# Patient Record
Sex: FEMALE | Race: BLACK OR AFRICAN AMERICAN | Employment: OTHER | URBAN - METROPOLITAN AREA
[De-identification: names, ages, dates, MRNs, and addresses within clinical notes are randomized per-mention and may not be internally consistent; named-entity substitution may affect disease eponyms.]

---

## 2017-01-03 ENCOUNTER — NURSE ONLY (OUTPATIENT)
Dept: CARDIOLOGY CLINIC | Age: 82
End: 2017-01-03

## 2017-01-03 VITALS
DIASTOLIC BLOOD PRESSURE: 80 MMHG | WEIGHT: 117 LBS | BODY MASS INDEX: 21.53 KG/M2 | SYSTOLIC BLOOD PRESSURE: 122 MMHG | HEART RATE: 72 BPM | HEIGHT: 62 IN

## 2017-01-03 DIAGNOSIS — Z95.0 CARDIAC PACEMAKER IN SITU: ICD-10-CM

## 2017-01-03 DIAGNOSIS — I48.92 ATRIAL FIBRILLATION AND FLUTTER (HCC): Primary | ICD-10-CM

## 2017-01-03 DIAGNOSIS — I48.91 ATRIAL FIBRILLATION AND FLUTTER (HCC): Primary | ICD-10-CM

## 2017-01-03 PROCEDURE — 93280 PM DEVICE PROGR EVAL DUAL: CPT | Performed by: INTERNAL MEDICINE

## 2017-03-03 RX ORDER — MIDODRINE HYDROCHLORIDE 5 MG/1
5 TABLET ORAL 3 TIMES DAILY
Qty: 270 TABLET | Refills: 3 | Status: SHIPPED | OUTPATIENT
Start: 2017-03-03 | End: 2017-04-27 | Stop reason: SDUPTHER

## 2017-03-27 ENCOUNTER — TELEPHONE (OUTPATIENT)
Dept: CARDIOLOGY CLINIC | Age: 82
End: 2017-03-27

## 2017-04-27 ENCOUNTER — PROCEDURE VISIT (OUTPATIENT)
Dept: CARDIOLOGY CLINIC | Age: 82
End: 2017-04-27

## 2017-04-27 ENCOUNTER — OFFICE VISIT (OUTPATIENT)
Dept: CARDIOLOGY CLINIC | Age: 82
End: 2017-04-27

## 2017-04-27 VITALS
SYSTOLIC BLOOD PRESSURE: 130 MMHG | DIASTOLIC BLOOD PRESSURE: 70 MMHG | WEIGHT: 117.2 LBS | HEART RATE: 80 BPM | BODY MASS INDEX: 21.57 KG/M2 | HEIGHT: 62 IN

## 2017-04-27 VITALS — HEART RATE: 72 BPM | SYSTOLIC BLOOD PRESSURE: 114 MMHG | DIASTOLIC BLOOD PRESSURE: 74 MMHG

## 2017-04-27 DIAGNOSIS — I48.19 PERSISTENT ATRIAL FIBRILLATION (HCC): Primary | ICD-10-CM

## 2017-04-27 PROCEDURE — 99214 OFFICE O/P EST MOD 30 MIN: CPT | Performed by: INTERNAL MEDICINE

## 2017-04-27 PROCEDURE — 93280 PM DEVICE PROGR EVAL DUAL: CPT | Performed by: INTERNAL MEDICINE

## 2017-04-27 RX ORDER — DIGOXIN 125 MCG
125 TABLET ORAL SEE ADMIN INSTRUCTIONS
Qty: 45 TABLET | Refills: 5 | Status: SHIPPED | OUTPATIENT
Start: 2017-04-27 | End: 2018-02-20 | Stop reason: SDUPTHER

## 2017-04-27 RX ORDER — MIDODRINE HYDROCHLORIDE 5 MG/1
5 TABLET ORAL 3 TIMES DAILY
Qty: 270 TABLET | Refills: 3 | Status: SHIPPED | OUTPATIENT
Start: 2017-04-27 | End: 2018-02-20 | Stop reason: SDUPTHER

## 2017-04-27 RX ORDER — FUROSEMIDE 20 MG/1
20 TABLET ORAL 2 TIMES DAILY
Qty: 360 TABLET | Refills: 2 | Status: SHIPPED | OUTPATIENT
Start: 2017-04-27 | End: 2018-02-20 | Stop reason: SDUPTHER

## 2017-04-27 NOTE — MR AVS SNAPSHOT
[address] [address]     Phone:  [phone]     apixaban 2.5 MG Tabs tablet    digoxin 125 MCG tablet    furosemide 20 MG tablet    midodrine 5 MG tablet         Your Current Medications Are              apixaban (ELIQUIS) 2.5 MG TABS tablet Take 1 tablet by mouth 2 times daily    midodrine (PROAMATINE) 5 MG tablet Take 1 tablet by mouth 3 times daily    digoxin (DIGOX) 125 MCG tablet Take 1 tablet by mouth See Admin Instructions One tablet every other day. furosemide (LASIX) 20 MG tablet Take 1 tablet by mouth 2 times daily    apixaban (ELIQUIS) 2.5 MG TABS tablet Take 1 tablet by mouth 2 times daily    sodium chloride (ALTAMIST SPRAY) 0.65 % nasal spray 2 sprays by Nasal route 4 times daily. calcium carbonate (OSCAL) 500 MG TABS tablet Take 500 mg by mouth 2 times daily       Allergies           No Known Allergies      We Ordered/Performed the following           Pacer coco dual chamber with reprog          Additional Information        Basic Information     Date Of Birth Sex Race Ethnicity Preferred Language    12/15/1921 Female Black Non-/Non  English      Problem List as of 4/27/2017  Date Reviewed: 11/17/2016                Atrial fibrillation and flutter ([facility] [address]    CHF (congestive heart failure) ([facility] [address]    Mitral regurgitation    Pedal edema      Immunizations as of 4/27/2017     Name Date    Influenza Virus Vaccine 9/18/2012, 9/27/2011, 10/9/2009, 10/23/2008    Influenza Whole 10/1/2010    Influenza, High dose, IM, Preservative-free 9/7/2016, 9/3/2015, 9/18/2014, 9/1/2013    Pneumococcal Conjugate 7-valent 1/1/2000    Pneumococcal Polysaccharide ([id number]) 1/1/2000    Tdap (Boostrix, Adacel) 7/20/2012, 1/1/2002      Health Maintenance Summary                    Pneumococcal low/med risk Overdue 1/1/2001     DTaP/Tdap/Td vaccine Next Due 7/20/2022             MyChart Signup           Our records indicate that you have declined MyChart signup.

## 2017-05-08 ENCOUNTER — TELEPHONE (OUTPATIENT)
Dept: CARDIOLOGY CLINIC | Age: 82
End: 2017-05-08

## 2017-05-08 ENCOUNTER — PROCEDURE VISIT (OUTPATIENT)
Dept: CARDIOLOGY CLINIC | Age: 82
End: 2017-05-08

## 2017-05-08 DIAGNOSIS — I48.19 PERSISTENT ATRIAL FIBRILLATION (HCC): ICD-10-CM

## 2017-05-08 LAB
LV EF: 60 %
LVEF MODALITY: NORMAL

## 2017-05-08 PROCEDURE — 93306 TTE W/DOPPLER COMPLETE: CPT | Performed by: INTERNAL MEDICINE

## 2017-05-11 ENCOUNTER — TELEPHONE (OUTPATIENT)
Dept: CARDIOLOGY CLINIC | Age: 82
End: 2017-05-11

## 2017-07-18 ENCOUNTER — OFFICE VISIT (OUTPATIENT)
Dept: FAMILY MEDICINE CLINIC | Age: 82
End: 2017-07-18

## 2017-07-18 VITALS
BODY MASS INDEX: 20.98 KG/M2 | DIASTOLIC BLOOD PRESSURE: 70 MMHG | HEART RATE: 76 BPM | TEMPERATURE: 98.2 F | WEIGHT: 114 LBS | HEIGHT: 62 IN | SYSTOLIC BLOOD PRESSURE: 102 MMHG

## 2017-07-18 DIAGNOSIS — J06.9 UPPER RESPIRATORY TRACT INFECTION, UNSPECIFIED TYPE: Primary | ICD-10-CM

## 2017-07-18 PROCEDURE — 99213 OFFICE O/P EST LOW 20 MIN: CPT | Performed by: FAMILY MEDICINE

## 2017-07-18 RX ORDER — AMOXICILLIN 500 MG/1
1 TABLET, FILM COATED ORAL 2 TIMES DAILY
Qty: 14 TABLET | Refills: 0 | Status: SHIPPED | OUTPATIENT
Start: 2017-07-18 | End: 2017-10-31 | Stop reason: ALTCHOICE

## 2017-07-25 ENCOUNTER — TELEPHONE (OUTPATIENT)
Dept: FAMILY MEDICINE CLINIC | Age: 82
End: 2017-07-25

## 2017-07-25 DIAGNOSIS — R04.0 EPISTAXIS: ICD-10-CM

## 2017-07-26 RX ORDER — ECHINACEA PURPUREA EXTRACT 125 MG
2 TABLET ORAL 4 TIMES DAILY
Qty: 1 BOTTLE | Refills: 3 | Status: SHIPPED | OUTPATIENT
Start: 2017-07-26 | End: 2018-10-01 | Stop reason: DRUGHIGH

## 2017-09-05 ENCOUNTER — NURSE ONLY (OUTPATIENT)
Dept: FAMILY MEDICINE CLINIC | Age: 82
End: 2017-09-05

## 2017-09-05 DIAGNOSIS — Z23 NEED FOR INFLUENZA VACCINATION: Primary | ICD-10-CM

## 2017-09-05 PROCEDURE — 99999 PR OFFICE/OUTPT VISIT,PROCEDURE ONLY: CPT | Performed by: FAMILY MEDICINE

## 2017-09-05 PROCEDURE — 90662 IIV NO PRSV INCREASED AG IM: CPT | Performed by: FAMILY MEDICINE

## 2017-09-05 PROCEDURE — G0008 ADMIN INFLUENZA VIRUS VAC: HCPCS | Performed by: FAMILY MEDICINE

## 2017-10-06 DIAGNOSIS — I48.19 PERSISTENT ATRIAL FIBRILLATION (HCC): ICD-10-CM

## 2017-10-19 ENCOUNTER — OFFICE VISIT (OUTPATIENT)
Dept: CARDIOLOGY CLINIC | Age: 82
End: 2017-10-19

## 2017-10-19 ENCOUNTER — PROCEDURE VISIT (OUTPATIENT)
Dept: CARDIOLOGY CLINIC | Age: 82
End: 2017-10-19

## 2017-10-19 VITALS
HEIGHT: 62 IN | BODY MASS INDEX: 21.16 KG/M2 | DIASTOLIC BLOOD PRESSURE: 60 MMHG | SYSTOLIC BLOOD PRESSURE: 114 MMHG | WEIGHT: 115 LBS | HEART RATE: 64 BPM

## 2017-10-19 VITALS — SYSTOLIC BLOOD PRESSURE: 114 MMHG | HEART RATE: 64 BPM | DIASTOLIC BLOOD PRESSURE: 60 MMHG

## 2017-10-19 DIAGNOSIS — Z95.0 CARDIAC PACEMAKER IN SITU: Primary | ICD-10-CM

## 2017-10-19 DIAGNOSIS — I48.19 PERSISTENT ATRIAL FIBRILLATION (HCC): ICD-10-CM

## 2017-10-19 PROCEDURE — 93280 PM DEVICE PROGR EVAL DUAL: CPT | Performed by: INTERNAL MEDICINE

## 2017-10-19 PROCEDURE — 99214 OFFICE O/P EST MOD 30 MIN: CPT | Performed by: INTERNAL MEDICINE

## 2017-10-19 NOTE — PROGRESS NOTES
trouble voiding, or hematuria  · Musculoskeletal:  No gait disturbance, weakness or joint complaints  · Integumentary: No rash or pruritis  · Neurological: No TIA or stroke symptoms  · Psychiatric: No anxiety or depression  · Endocrine: No malaise, fatigue or temperature intolerance  · Hematologic/Lymphatic: No bleeding problems, blood clots or swollen lymph nodes  · Allergic/Immunologic: No nasal congestion or hives  All systems negative except as marked. Objective:  /60   Pulse 64   Ht 5' 2\" (1.575 m)   Wt 115 lb (52.2 kg)   BMI 21.03 kg/m²   Wt Readings from Last 3 Encounters:   10/19/17 115 lb (52.2 kg)   07/18/17 114 lb (51.7 kg)   04/27/17 117 lb 3.2 oz (53.2 kg)     Body mass index is 21.03 kg/m². GENERAL - Alert, oriented, pleasant, in no apparent distress,normal grooming  HEENT  pupils are reactive to light and accomodation, cornea intact, conjunctive normal color, ears are normal in exam,throat exam in normal, teeth, gum and palate are normal, oral mucosa is normal without any notation of pallor or cyanosis  Neck - Supple. No jugular venous distention noted. No carotid bruits, no apical -carotid delay  Respiratory:  Normal breath sounds, No respiratory distress, No wheezing, No chest tenderness. ,no use of accessory muscles, diaphragm movement is normal  Cardiovascular: (PMI) apex non displaced,no lifts no thrills, no s3,no s4, Normal heart rate, Normal rhythm, No murmurs, No rubs, No gallops.  Carotid arteries pulse and amplitude are normal no bruit, no abdominal bruit noted ( normal abdominal aorta ausculation), femoral arteries pulse and amplitude are normal no bruit, pedal pulses are normal  Femoral pulses have normal amplitude, no bruits   Extremities - No cyanosis, clubbing, or significant edema, no varicose veins    Abdomen  No masses, tenderness, or organomegaly, no hepato-splenomegally, no bruits  Musculoskeletal  No significant edema, no kyphosis or scoliosis, no deformity in any

## 2017-10-31 ENCOUNTER — OFFICE VISIT (OUTPATIENT)
Dept: FAMILY MEDICINE CLINIC | Age: 82
End: 2017-10-31

## 2017-10-31 VITALS
HEIGHT: 62 IN | SYSTOLIC BLOOD PRESSURE: 110 MMHG | HEART RATE: 68 BPM | DIASTOLIC BLOOD PRESSURE: 60 MMHG | BODY MASS INDEX: 20.98 KG/M2 | WEIGHT: 114 LBS

## 2017-10-31 DIAGNOSIS — R41.0 CONFUSION: Primary | ICD-10-CM

## 2017-10-31 DIAGNOSIS — I50.42 HEART FAILURE, SYSTOLIC AND DIASTOLIC, CHRONIC (HCC): ICD-10-CM

## 2017-10-31 LAB
A/G RATIO: 1.3 (ref 1.1–2.2)
ALBUMIN SERPL-MCNC: 4.3 G/DL (ref 3.4–5)
ALP BLD-CCNC: 54 U/L (ref 40–129)
ALT SERPL-CCNC: 11 U/L (ref 10–40)
ANION GAP SERPL CALCULATED.3IONS-SCNC: 12 MMOL/L (ref 3–16)
AST SERPL-CCNC: 25 U/L (ref 15–37)
BASOPHILS ABSOLUTE: 0.1 K/UL (ref 0–0.2)
BASOPHILS RELATIVE PERCENT: 0.7 %
BILIRUB SERPL-MCNC: 0.7 MG/DL (ref 0–1)
BILIRUBIN, POC: NORMAL
BLOOD URINE, POC: NORMAL
BUN BLDV-MCNC: 16 MG/DL (ref 7–20)
CALCIUM SERPL-MCNC: 9.9 MG/DL (ref 8.3–10.6)
CHLORIDE BLD-SCNC: 100 MMOL/L (ref 99–110)
CLARITY, POC: CLEAR
CO2: 31 MMOL/L (ref 21–32)
COLOR, POC: YELLOW
CREAT SERPL-MCNC: 0.7 MG/DL (ref 0.6–1.2)
EOSINOPHILS ABSOLUTE: 0.1 K/UL (ref 0–0.6)
EOSINOPHILS RELATIVE PERCENT: 1.5 %
FOLATE: >20 NG/ML (ref 4.78–24.2)
GFR AFRICAN AMERICAN: >60
GFR NON-AFRICAN AMERICAN: >60
GLOBULIN: 3.2 G/DL
GLUCOSE BLD-MCNC: 79 MG/DL (ref 70–99)
GLUCOSE URINE, POC: NORMAL
HCT VFR BLD CALC: 39.7 % (ref 36–48)
HEMOGLOBIN: 13.3 G/DL (ref 12–16)
KETONES, POC: NORMAL
LEUKOCYTE EST, POC: NORMAL
LYMPHOCYTES ABSOLUTE: 1.1 K/UL (ref 1–5.1)
LYMPHOCYTES RELATIVE PERCENT: 15.4 %
MCH RBC QN AUTO: 30.9 PG (ref 26–34)
MCHC RBC AUTO-ENTMCNC: 33.5 G/DL (ref 31–36)
MCV RBC AUTO: 92.4 FL (ref 80–100)
MONOCYTES ABSOLUTE: 0.4 K/UL (ref 0–1.3)
MONOCYTES RELATIVE PERCENT: 5.3 %
NEUTROPHILS ABSOLUTE: 5.5 K/UL (ref 1.7–7.7)
NEUTROPHILS RELATIVE PERCENT: 77.1 %
NITRITE, POC: NORMAL
PDW BLD-RTO: 15.1 % (ref 12.4–15.4)
PH, POC: 5.5
PLATELET # BLD: 154 K/UL (ref 135–450)
PMV BLD AUTO: 9.4 FL (ref 5–10.5)
POTASSIUM SERPL-SCNC: 4.6 MMOL/L (ref 3.5–5.1)
PROTEIN, POC: NORMAL
RBC # BLD: 4.29 M/UL (ref 4–5.2)
SODIUM BLD-SCNC: 143 MMOL/L (ref 136–145)
SPECIFIC GRAVITY, POC: 1
TOTAL PROTEIN: 7.5 G/DL (ref 6.4–8.2)
TSH SERPL DL<=0.05 MIU/L-ACNC: 0.64 UIU/ML (ref 0.27–4.2)
UROBILINOGEN, POC: 0.2
VITAMIN B-12: 754 PG/ML (ref 211–911)
WBC # BLD: 7.1 K/UL (ref 4–11)

## 2017-10-31 PROCEDURE — 3288F FALL RISK ASSESSMENT DOCD: CPT | Performed by: FAMILY MEDICINE

## 2017-10-31 PROCEDURE — G8510 SCR DEP NEG, NO PLAN REQD: HCPCS | Performed by: FAMILY MEDICINE

## 2017-10-31 PROCEDURE — 81003 URINALYSIS AUTO W/O SCOPE: CPT | Performed by: FAMILY MEDICINE

## 2017-10-31 PROCEDURE — 36415 COLL VENOUS BLD VENIPUNCTURE: CPT | Performed by: FAMILY MEDICINE

## 2017-10-31 PROCEDURE — 99214 OFFICE O/P EST MOD 30 MIN: CPT | Performed by: FAMILY MEDICINE

## 2017-10-31 RX ORDER — AMMONIUM LACTATE 12 G/100G
LOTION TOPICAL PRN
COMMUNITY
End: 2018-02-20 | Stop reason: SDUPTHER

## 2017-10-31 ASSESSMENT — PATIENT HEALTH QUESTIONNAIRE - PHQ9
2. FEELING DOWN, DEPRESSED OR HOPELESS: 0
SUM OF ALL RESPONSES TO PHQ QUESTIONS 1-9: 0
1. LITTLE INTEREST OR PLEASURE IN DOING THINGS: 0
SUM OF ALL RESPONSES TO PHQ9 QUESTIONS 1 & 2: 0

## 2017-10-31 NOTE — PROGRESS NOTES
Subjective:      Patient ID: Fredrich Dance is a 80 y.o. female. HPI memory loss: Patient is brought here by her friend. The friend is concerned that patient's memory is not so good anymore. In the last week, patient has been asking the same questions over and over her caregivers. She is also had a mild tremor in her hand. Denied any fever chills or sweats. Denied any urinary discomfort or frequency. Denies diarrhea (violeta who is here says this is not true). There has been no change in appetite. No recent falls. No increase in caffeine. Patient Active Problem List   Diagnosis    Pedal edema    Atrial fibrillation and flutter (HCC)    CHF (congestive heart failure) (HCC)    Mitral regurgitation     Past Surgical History:   Procedure Laterality Date    APPENDECTOMY      with choley    BREAST BIOPSY  1950's    left breast - negative for cancer    CARDIOVASCULAR STRESS TEST  11/2007    treadmill    CATARACT REMOVAL  12/2008    Left    CHOLECYSTECTOMY  unknown    COLONOSCOPY  2010    HERNIA REPAIR  1986    left inguinal    INGUINAL HERNIA REPAIR  08-20-12    Right Side     PACEMAKER INSERTION  03/14/14    L upper chest    TONSILLECTOMY  23years old.  VARICOSE VEIN SURGERY      stripping bilat legs         Review of Systems  Review of systems per HPI. Objective:   Physical Exam   Constitutional: She appears well-developed and well-nourished. No distress. HENT:   Head: Normocephalic and atraumatic. Right Ear: Hearing, tympanic membrane and external ear normal.   Left Ear: Hearing, tympanic membrane and external ear normal.   Nose: Nose normal. No mucosal edema, rhinorrhea, nose lacerations, sinus tenderness or nasal deformity. Mouth/Throat: Oropharynx is clear and moist and mucous membranes are normal. No oropharyngeal exudate, posterior oropharyngeal edema or posterior oropharyngeal erythema. Eyes: Conjunctivae are normal. Pupils are equal, round, and reactive to light.    Neck: Neck supple. No tracheal deviation present. No thyromegaly present. Cardiovascular: Normal rate, regular rhythm, S1 normal and S2 normal.  Exam reveals no gallop and no friction rub. Murmur heard. Systolic murmur is present with a grade of 3/6   Pulmonary/Chest: Effort normal and breath sounds normal. No respiratory distress. She has no wheezes. She has no rales. Abdominal: Soft. She exhibits no distension and no mass. There is no tenderness. Musculoskeletal: She exhibits no edema. Right lower leg: She exhibits no edema. Left lower leg: She exhibits no edema. Lymphadenopathy:        Right cervical: No superficial cervical, no deep cervical and no posterior cervical adenopathy present. Left cervical: No superficial cervical, no deep cervical and no posterior cervical adenopathy present. Neurological: She is alert. She displays no tremor. Patient oriented to month, Halloween day. She is not oriented to day or year   Skin: No bruising noted. Psychiatric: She has a normal mood and affect. Her behavior is normal. Judgment and thought content normal.     Vitals:    10/31/17 1109 10/31/17 1123   BP: (!) 150/80 110/60   Pulse: 68    Weight: 114 lb (51.7 kg)    Height: 5' 2\" (1.575 m)      Body mass index is 20.85 kg/m². Wt Readings from Last 3 Encounters:   10/31/17 114 lb (51.7 kg)   10/19/17 115 lb (52.2 kg)   07/18/17 114 lb (51.7 kg)     BP Readings from Last 3 Encounters:   10/31/17 110/60   10/19/17 114/60   10/19/17 114/60      Results for orders placed or performed in visit on 10/31/17   POCT Urinalysis No Micro (Auto)   Result Value Ref Range    Color, UA yellow     Clarity, UA clear     Glucose, UA POC neg     Bilirubin, UA neg     Ketones, UA neg     Spec Grav, UA 1.005     Blood, UA POC small     pH, UA 5.5     Protein, UA POC neg     Urobilinogen, UA 0.2     Leukocytes, UA neg     Nitrite, UA neg        Assessment:      1.  Confusion  POCT Urinalysis No Micro (Auto)

## 2017-11-02 LAB — URINE CULTURE, ROUTINE: NORMAL

## 2017-11-14 ENCOUNTER — OFFICE VISIT (OUTPATIENT)
Dept: FAMILY MEDICINE CLINIC | Age: 82
End: 2017-11-14

## 2017-11-14 VITALS
SYSTOLIC BLOOD PRESSURE: 118 MMHG | BODY MASS INDEX: 21.03 KG/M2 | HEART RATE: 62 BPM | RESPIRATION RATE: 16 BRPM | WEIGHT: 115 LBS | DIASTOLIC BLOOD PRESSURE: 60 MMHG

## 2017-11-14 DIAGNOSIS — R41.3 MEMORY DIFFICULTIES: Primary | ICD-10-CM

## 2017-11-14 PROCEDURE — 99213 OFFICE O/P EST LOW 20 MIN: CPT | Performed by: FAMILY MEDICINE

## 2017-11-14 NOTE — PROGRESS NOTES
Subjective:      Patient ID: Christina Zuniga is a 80 y.o. female. HPI here for follow-up of memory loss: Her friend who was with her has not had any more ports in regards to patient's memory problems. The nursing home that she states had has been no further complaints. Patient feels fine with her memory. The head nurse at her facility was expecting more paperwork in regards to patient's current status. She was not satisfied with the paperwork from our office the last time. Review of Systems   Psychiatric/Behavioral: Negative for behavioral problems, confusion and dysphoric mood. Patient Active Problem List   Diagnosis    Pedal edema    Atrial fibrillation and flutter (HCC)    CHF (congestive heart failure) (HCC)    Mitral regurgitation     Past Surgical History:   Procedure Laterality Date    APPENDECTOMY      with choley    BREAST BIOPSY  1950's    left breast - negative for cancer    CARDIOVASCULAR STRESS TEST  11/2007    treadmill    CATARACT REMOVAL  12/2008    Left    CHOLECYSTECTOMY  unknown    COLONOSCOPY  2010    HERNIA REPAIR  1986    left inguinal    INGUINAL HERNIA REPAIR  08-20-12    Right Side     PACEMAKER INSERTION  03/14/14    L upper chest    TONSILLECTOMY  23years old.  VARICOSE VEIN SURGERY      stripping bilat legs         Objective:   Physical Exam   Constitutional: She is oriented to person, place, and time. She appears well-developed and well-nourished. No distress. Walks very very slowly with her walker   Neurological: She is oriented to person, place, and time. Isidro Motley Psychiatric: She has a normal mood and affect.  Her behavior is normal. Judgment and thought content normal.     Lab Review   Office Visit on 10/31/2017   Component Date Value    Color, UA 10/31/2017 yellow     Clarity, UA 10/31/2017 clear     Glucose, UA POC 10/31/2017 neg     Bilirubin, UA 10/31/2017 neg     Ketones, UA 10/31/2017 neg     Spec Grav, UA 10/31/2017 1.005     Blood, UA POC 10/31/2017 small     pH, UA 10/31/2017 5.5     Protein, UA POC 10/31/2017 neg     Urobilinogen, UA 10/31/2017 0.2     Leukocytes, UA 10/31/2017 neg     Nitrite, UA 10/31/2017 neg     Sodium 10/31/2017 143     Potassium 10/31/2017 4.6     Chloride 10/31/2017 100     CO2 10/31/2017 31     Anion Gap 10/31/2017 12     Glucose 10/31/2017 79     BUN 10/31/2017 16     CREATININE 10/31/2017 0.7     GFR Non- 10/31/2017 >60     GFR  10/31/2017 >60     Calcium 10/31/2017 9.9     Total Protein 10/31/2017 7.5     Alb 10/31/2017 4.3     Albumin/Globulin Ratio 10/31/2017 1.3     Total Bilirubin 10/31/2017 0.7     Alkaline Phosphatase 10/31/2017 54     ALT 10/31/2017 11     AST 10/31/2017 25     Globulin 10/31/2017 3.2     TSH 10/31/2017 0.64     Vitamin B-12 10/31/2017 754     Folate 10/31/2017 >20.00     WBC 10/31/2017 7.1     RBC 10/31/2017 4.29     Hemoglobin 10/31/2017 13.3     Hematocrit 10/31/2017 39.7     MCV 10/31/2017 92.4     MCH 10/31/2017 30.9     MCHC 10/31/2017 33.5     RDW 10/31/2017 15.1     Platelets 82/92/2371 154     MPV 10/31/2017 9.4     Neutrophils % 10/31/2017 77.1     Lymphocytes % 10/31/2017 15.4     Monocytes % 10/31/2017 5.3     Eosinophils % 10/31/2017 1.5     Basophils % 10/31/2017 0.7     Neutrophils # 10/31/2017 5.5     Lymphocytes # 10/31/2017 1.1     Monocytes # 10/31/2017 0.4     Eosinophils # 10/31/2017 0.1     Basophils # 10/31/2017 0.1     Urine Culture, Routine 11/02/2017 <10,000 CFU/ml mixed skin/urogenital jack. No further workup        Assessment:      1. Memory difficulties             Plan:      I feel that patient is doing well. Her memory seems to be stable currently. Her labs are all normal.  I see no further need for follow-up on this or for any new medications or test to be ordered. She may follow up as needed. She is doing well for a 80year-old. Letter generated for her nursing home.   See letter in chart

## 2017-11-14 NOTE — LETTER
[address] [address] [address] [place] [address] [place],[address]  Phone: [phone]  Fax: [phone]    Kumar Serra MD        November 14, 2017    [id number] [facility]      Dear Uzma/ Brittney Addison:    Ms. Vi Torrez was seen today. She is doing fine. No regularly scheduled physicals or blood work are necessary at this time. Ms. Vi Torrez may follow up only as needed. If you have any questions or concerns, please don't hesitate to call.     Sincerely,        Kumar Serra MD

## 2017-12-18 ENCOUNTER — OFFICE VISIT (OUTPATIENT)
Dept: FAMILY MEDICINE CLINIC | Age: 82
End: 2017-12-18

## 2017-12-18 VITALS
HEIGHT: 62 IN | DIASTOLIC BLOOD PRESSURE: 70 MMHG | SYSTOLIC BLOOD PRESSURE: 128 MMHG | BODY MASS INDEX: 21.09 KG/M2 | WEIGHT: 114.6 LBS | HEART RATE: 76 BPM

## 2017-12-18 DIAGNOSIS — H57.89 REDNESS, EYE: Primary | ICD-10-CM

## 2017-12-18 PROCEDURE — 99213 OFFICE O/P EST LOW 20 MIN: CPT | Performed by: FAMILY MEDICINE

## 2017-12-18 ASSESSMENT — ENCOUNTER SYMPTOMS
BLURRED VISION: 0
BLOOD IN STOOL: 0
EYE DISCHARGE: 0

## 2017-12-18 NOTE — PROGRESS NOTES
Patient ID: Randolph Fransk 12/15/1921      Eye Problem    The right eye is affected. This is a new problem. Episode onset: 2-3 days. The problem occurs constantly. The problem has been unchanged. There was no injury mechanism (admits to pulling eyelashes out of her eye sometimes). The pain is at a severity of 0/10. The patient is experiencing no pain. There is no known exposure to pink eye. She wears contacts. Pertinent negatives include no blurred vision or eye discharge. She has tried nothing for the symptoms. Patient Active Problem List   Diagnosis    Pedal edema    Atrial fibrillation and flutter (HCC)    CHF (congestive heart failure) (HCC)    Mitral regurgitation       Past Medical History:   Diagnosis Date    Atrial fibrillation and flutter ([address]     CHF (congestive heart failure) ([address]     Diverticulosis     Enlarged thyroid     GERD (gastroesophageal reflux disease)     H/O echocardiogram 05/08/2017    EF 60%     History of echocardiogram 09/26/2016    EF60%-moderate AS, moderate MR, MILD AI, enlarged LA, MODERATE TR    IBS (irritable bowel syndrome)     Mitral regurgitation     moderate severity; Tricuspid Regurgitation: mild severity    Osteoarthritis     primarily affecting the neck, fingers and knees    Osteoporosis     Pedal edema        Past Surgical History:   Procedure Laterality Date    APPENDECTOMY      with choley    BREAST BIOPSY  1950's    left breast - negative for cancer    CARDIOVASCULAR STRESS TEST  11/2007    treadmill    CATARACT REMOVAL  12/2008    Left    CHOLECYSTECTOMY  unknown    COLONOSCOPY  2010    HERNIA REPAIR  1986    left inguinal    INGUINAL HERNIA REPAIR  08-20-12    Right Side     PACEMAKER INSERTION  03/14/14    L upper chest    TONSILLECTOMY  23years old.  VARICOSE VEIN SURGERY      stripping bilat legs       Review of Systems   HENT: Negative for nosebleeds. Eyes: Negative for blurred vision and discharge.    Gastrointestinal: Negative for blood in stool. Genitourinary: Negative for hematuria. Objective:   Physical Exam   Eyes: Conjunctivae are normal. Pupils are equal, round, and reactive to light. Right eye exhibits no discharge, no exudate and no hordeolum. No foreign body present in the right eye. Right sclera injected at the bottom     Vitals:    12/18/17 1502   BP: 128/70   Site: Left Arm   Position: Sitting   Cuff Size: Medium Adult   Pulse: 76   Weight: 114 lb 9.6 oz (52 kg)   Height: 5' 2\" (1.575 m)     Body mass index is 20.96 kg/m². Wt Readings from Last 3 Encounters:   12/18/17 114 lb 9.6 oz (52 kg)   11/14/17 115 lb (52.2 kg)   10/31/17 114 lb (51.7 kg)     BP Readings from Last 3 Encounters:   12/18/17 128/70   11/14/17 118/60   10/31/17 110/60          No results found for this visit on 12/18/17. Assessment:         1. Redness, eye         Plan:      Explained that had a ruptured blood vessel in her eye. This should resolve spontaneously. She is at high risk for this because of her blood thinners. Recommend avoiding further trauma to the eye.

## 2018-01-23 ENCOUNTER — OFFICE VISIT (OUTPATIENT)
Dept: FAMILY MEDICINE CLINIC | Age: 83
End: 2018-01-23

## 2018-01-23 VITALS
SYSTOLIC BLOOD PRESSURE: 120 MMHG | TEMPERATURE: 97.4 F | HEART RATE: 72 BPM | BODY MASS INDEX: 21.71 KG/M2 | WEIGHT: 118 LBS | DIASTOLIC BLOOD PRESSURE: 70 MMHG | HEIGHT: 62 IN

## 2018-01-23 DIAGNOSIS — L85.3 DRY SKIN DERMATITIS: Primary | ICD-10-CM

## 2018-01-23 PROCEDURE — 99212 OFFICE O/P EST SF 10 MIN: CPT | Performed by: FAMILY MEDICINE

## 2018-01-23 RX ORDER — AMMONIUM LACTATE 12 G/100G
LOTION TOPICAL 2 TIMES DAILY
Qty: 1 BOTTLE | Refills: 11 | Status: SHIPPED | OUTPATIENT
Start: 2018-01-23 | End: 2018-09-28

## 2018-01-29 ENCOUNTER — TELEPHONE (OUTPATIENT)
Dept: FAMILY MEDICINE CLINIC | Age: 83
End: 2018-01-29

## 2018-01-29 DIAGNOSIS — Z23 NEED FOR SHINGLES VACCINE: Primary | ICD-10-CM

## 2018-02-20 ENCOUNTER — HOSPITAL ENCOUNTER (OUTPATIENT)
Dept: GENERAL RADIOLOGY | Age: 83
Discharge: HOME | End: 2018-02-20
Attending: INTERNAL MEDICINE | Admitting: INTERNAL MEDICINE

## 2018-02-20 ENCOUNTER — OFFICE VISIT (OUTPATIENT)
Dept: CARDIOLOGY CLINIC | Age: 83
End: 2018-02-20

## 2018-02-20 ENCOUNTER — PROCEDURE VISIT (OUTPATIENT)
Dept: CARDIOLOGY CLINIC | Age: 83
End: 2018-02-20

## 2018-02-20 VITALS
HEIGHT: 62 IN | BODY MASS INDEX: 21.31 KG/M2 | WEIGHT: 115.8 LBS | HEART RATE: 108 BPM | DIASTOLIC BLOOD PRESSURE: 88 MMHG | SYSTOLIC BLOOD PRESSURE: 136 MMHG

## 2018-02-20 VITALS — DIASTOLIC BLOOD PRESSURE: 88 MMHG | SYSTOLIC BLOOD PRESSURE: 136 MMHG | HEART RATE: 108 BPM

## 2018-02-20 DIAGNOSIS — I48.19 PERSISTENT ATRIAL FIBRILLATION (HCC): ICD-10-CM

## 2018-02-20 DIAGNOSIS — R00.2 PALPITATIONS: Primary | ICD-10-CM

## 2018-02-20 DIAGNOSIS — Z95.0 CARDIAC PACEMAKER IN SITU: Primary | ICD-10-CM

## 2018-02-20 LAB
ANION GAP SERPL CALCULATED.3IONS-SCNC: 12 MMOL/L (ref 4–16)
BASOPHILS ABSOLUTE: 0.1 K/CU MM
BASOPHILS RELATIVE PERCENT: 0.8 % (ref 0–1)
BUN BLDV-MCNC: 16 MG/DL (ref 6–23)
CALCIUM SERPL-MCNC: 9.4 MG/DL (ref 8.3–10.6)
CHLORIDE BLD-SCNC: 99 MMOL/L (ref 99–110)
CO2: 29 MMOL/L (ref 21–32)
CREAT SERPL-MCNC: 0.8 MG/DL (ref 0.6–1.1)
DIFFERENTIAL TYPE: ABNORMAL
EOSINOPHILS ABSOLUTE: 0.2 K/CU MM
EOSINOPHILS RELATIVE PERCENT: 2.5 % (ref 0–3)
GFR AFRICAN AMERICAN: >60 ML/MIN/1.73M2
GFR NON-AFRICAN AMERICAN: >60 ML/MIN/1.73M2
GLUCOSE BLD-MCNC: 91 MG/DL (ref 70–99)
HCT VFR BLD CALC: 41.1 % (ref 37–47)
HEMOGLOBIN: 13 GM/DL (ref 12.5–16)
IMMATURE NEUTROPHIL %: 0.4 % (ref 0–0.43)
LYMPHOCYTES ABSOLUTE: 1.4 K/CU MM
LYMPHOCYTES RELATIVE PERCENT: 16.6 % (ref 24–44)
MCH RBC QN AUTO: 30.3 PG (ref 27–31)
MCHC RBC AUTO-ENTMCNC: 31.6 % (ref 32–36)
MCV RBC AUTO: 95.8 FL (ref 78–100)
MONOCYTES ABSOLUTE: 0.5 K/CU MM
MONOCYTES RELATIVE PERCENT: 6.4 % (ref 0–4)
NUCLEATED RBC %: 0 %
PDW BLD-RTO: 14.3 % (ref 11.7–14.9)
PLATELET # BLD: 194 K/CU MM (ref 140–440)
PMV BLD AUTO: 10.4 FL (ref 7.5–11.1)
POTASSIUM SERPL-SCNC: 4.2 MMOL/L (ref 3.5–5.1)
RBC # BLD: 4.29 M/CU MM (ref 4.2–5.4)
SEGMENTED NEUTROPHILS ABSOLUTE COUNT: 6.1 K/CU MM
SEGMENTED NEUTROPHILS RELATIVE PERCENT: 73.3 % (ref 36–66)
SODIUM BLD-SCNC: 140 MMOL/L (ref 135–145)
TOTAL IMMATURE NEUTOROPHIL: 0.03 K/CU MM
TOTAL NUCLEATED RBC: 0 K/CU MM
WBC # BLD: 8.3 K/CU MM (ref 4–10.5)

## 2018-02-20 PROCEDURE — 93280 PM DEVICE PROGR EVAL DUAL: CPT | Performed by: INTERNAL MEDICINE

## 2018-02-20 PROCEDURE — 99214 OFFICE O/P EST MOD 30 MIN: CPT | Performed by: INTERNAL MEDICINE

## 2018-02-20 RX ORDER — FUROSEMIDE 20 MG/1
20 TABLET ORAL 2 TIMES DAILY
Qty: 180 TABLET | Refills: 3 | Status: SHIPPED | OUTPATIENT
Start: 2018-02-20 | End: 2018-09-17 | Stop reason: SDUPTHER

## 2018-02-20 RX ORDER — MIDODRINE HYDROCHLORIDE 5 MG/1
5 TABLET ORAL 3 TIMES DAILY
Qty: 270 TABLET | Refills: 3 | Status: SHIPPED | OUTPATIENT
Start: 2018-02-20 | End: 2018-03-05

## 2018-02-20 RX ORDER — DIGOXIN 125 MCG
125 TABLET ORAL SEE ADMIN INSTRUCTIONS
Qty: 45 TABLET | Refills: 3 | Status: SHIPPED | OUTPATIENT
Start: 2018-02-20 | End: 2018-06-18 | Stop reason: ALTCHOICE

## 2018-02-20 NOTE — PROGRESS NOTES
Meño Mendoza MD        OFFICE  FOLLOWUP NOTE    Chief complaints: patient is here for management of  CHF, PPM,AFIB, DIZZINESS  Subjective: patient feels better, no chest pain, no shortness of breath, no dizziness, no palpitations    HPI Ernie Alves is a 80 y. o.year old who  has a past medical history of Atrial fibrillation and flutter ([facility] [address] CHF (congestive heart failure) ([facility] [address] Diverticulosis; Enlarged thyroid; GERD (gastroesophageal reflux disease); H/O echocardiogram; History of echocardiogram; IBS (irritable bowel syndrome); Mitral regurgitation; Osteoarthritis; Osteoporosis; and Pedal edema. and presents for management of  CHF, PPM,AFIB, DIZZINESS which are well controlled, she  Has been talking aspirin and eliquis and had epistaxis as well, her intial heart rate was 108, her EKG showed heart rate of 99, she denied any complaints of shortness of breath and chest pain. Current Outpatient Prescriptions   Medication Sig Dispense Refill    midodrine (PROAMATINE) 5 MG tablet Take 1 tablet by mouth 3 times daily 270 tablet 3    digoxin (DIGOX) 125 MCG tablet Take 1 tablet by mouth See Admin Instructions One tablet every other day. 45 tablet 3    furosemide (LASIX) 20 MG tablet Take 1 tablet by mouth 2 times daily 180 tablet 3    ammonium lactate (LAC-HYDRIN) 12 % lotion Apply topically 2 times daily 1 Bottle 11    apixaban (ELIQUIS) 2.5 MG TABS tablet Take 1 tablet by mouth 2 times daily 56 tablet 0    sodium chloride (ALTAMIST SPRAY) 0.65 % nasal spray 2 sprays by Nasal route 4 times daily 1 Bottle 3    calcium carbonate (OSCAL) 500 MG TABS tablet Take 500 mg by mouth 2 times daily        No current facility-administered medications for this visit. Allergies: Review of patient's allergies indicates no known allergies.   Past Medical History:   Diagnosis Date    Atrial fibrillation and flutter (HCC)     CHF (congestive heart failure) (HCC)     Diverticulosis     Enlarged thyroid  GERD (gastroesophageal reflux disease)     H/O echocardiogram 05/08/2017    EF 60%     History of echocardiogram 09/26/2016    EF60%-moderate AS, moderate MR, MILD AI, enlarged LA, MODERATE TR    IBS (irritable bowel syndrome)     Mitral regurgitation     moderate severity; Tricuspid Regurgitation: mild severity    Osteoarthritis     primarily affecting the neck, fingers and knees    Osteoporosis     Pedal edema      Past Surgical History:   Procedure Laterality Date    APPENDECTOMY      with choley    BREAST BIOPSY  1950's    left breast - negative for cancer    CARDIOVASCULAR STRESS TEST  11/2007    treadmill    CATARACT REMOVAL  12/2008    Left    CHOLECYSTECTOMY  unknown    COLONOSCOPY  2010    HERNIA REPAIR  1986    left inguinal    INGUINAL HERNIA REPAIR  08-20-12    Right Side     PACEMAKER INSERTION  03/14/14    L upper chest    TONSILLECTOMY  23years old.  VARICOSE VEIN SURGERY      stripping bilat legs     Family History   Problem Relation Age of Onset    High Blood Pressure Sister     Other Sister      sinus problems, vericose veins    Other Father      GI probloems - hx obstruction    Heart Disease Sister      CHF    Diabetes Sister      s/p partial pancreas removal    Early Death Daughter 40     unknown cause    Asthma Son     Cancer Brother      leukemia    Heart Disease Brother      CABG    Early Death Brother      Murdered    Other Brother      dies at 80. unknown cause.     Cancer Brother      throat    Early Death Brother 5     pneumonia     Social History   Substance Use Topics    Smoking status: Never Smoker    Smokeless tobacco: Never Used    Alcohol use No      [unfilled]  Review of Systems:   · Constitutional: No Fever or Weight Loss   · Eyes: No Decreased Vision  · ENT: No Headaches, Hearing Loss or Vertigo  · Cardiovascular: No chest pain, dyspnea on exertion, palpitations or loss of consciousness  · Respiratory: No cough or wheezing · Gastrointestinal: No abdominal pain, appetite loss, blood in stools, constipation, diarrhea or heartburn  · Genitourinary: No dysuria, trouble voiding, or hematuria  · Musculoskeletal:  No gait disturbance, weakness or joint complaints  · Integumentary: No rash or pruritis  · Neurological: No TIA or stroke symptoms  · Psychiatric: No anxiety or depression  · Endocrine: No malaise, fatigue or temperature intolerance  · Hematologic/Lymphatic: No bleeding problems, blood clots or swollen lymph nodes  · Allergic/Immunologic: No nasal congestion or hives  All systems negative except as marked. Objective:  /88   Pulse 108   Ht 5' 2\" (1.575 m)   Wt 115 lb 12.8 oz (52.5 kg)   BMI 21.18 kg/m²   Wt Readings from Last 3 Encounters:   02/20/18 115 lb 12.8 oz (52.5 kg)   01/23/18 118 lb (53.5 kg)   12/18/17 114 lb 9.6 oz (52 kg)     Body mass index is 21.18 kg/m². GENERAL - Alert, oriented, pleasant, in no apparent distress,normal grooming  HEENT  pupils are reactive to light and accomodation, cornea intact, conjunctive normal color, ears are normal in exam,throat exam in normal, teeth, gum and palate are normal, oral mucosa is normal without any notation of pallor or cyanosis  Neck - Supple. No jugular venous distention noted. No carotid bruits, no apical -carotid delay  Respiratory:  Normal breath sounds, No respiratory distress, No wheezing, No chest tenderness. ,no use of accessory muscles, diaphragm movement is normal  Cardiovascular: (PMI) apex non displaced,no lifts no thrills, no s3,no s4, Normal heart rate, Normal rhythm, No murmurs, No rubs, No gallops.  Carotid arteries pulse and amplitude are normal no bruit, no abdominal bruit noted ( normal abdominal aorta ausculation), femoral arteries pulse and amplitude are normal no bruit, pedal pulses are normal  Femoral pulses have normal amplitude, no bruits   Extremities - No cyanosis, clubbing, or significant edema, no varicose veins    Abdomen  No

## 2018-03-05 ENCOUNTER — OFFICE VISIT (OUTPATIENT)
Dept: CARDIOLOGY CLINIC | Age: 83
End: 2018-03-05

## 2018-03-05 VITALS
SYSTOLIC BLOOD PRESSURE: 132 MMHG | WEIGHT: 115.4 LBS | DIASTOLIC BLOOD PRESSURE: 76 MMHG | HEIGHT: 64 IN | HEART RATE: 96 BPM | BODY MASS INDEX: 19.7 KG/M2

## 2018-03-05 DIAGNOSIS — I48.0 PAROXYSMAL ATRIAL FIBRILLATION (HCC): Primary | ICD-10-CM

## 2018-03-05 PROCEDURE — 99214 OFFICE O/P EST MOD 30 MIN: CPT | Performed by: INTERNAL MEDICINE

## 2018-03-14 ENCOUNTER — TELEPHONE (OUTPATIENT)
Dept: CARDIOLOGY CLINIC | Age: 83
End: 2018-03-14

## 2018-03-27 ENCOUNTER — TELEPHONE (OUTPATIENT)
Dept: CARDIOLOGY CLINIC | Age: 83
End: 2018-03-27

## 2018-04-05 ENCOUNTER — OFFICE VISIT (OUTPATIENT)
Dept: CARDIOLOGY CLINIC | Age: 83
End: 2018-04-05

## 2018-04-05 VITALS
DIASTOLIC BLOOD PRESSURE: 82 MMHG | SYSTOLIC BLOOD PRESSURE: 120 MMHG | WEIGHT: 116 LBS | HEART RATE: 96 BPM | BODY MASS INDEX: 19.81 KG/M2 | HEIGHT: 64 IN

## 2018-04-05 DIAGNOSIS — I48.0 PAROXYSMAL ATRIAL FIBRILLATION (HCC): Primary | ICD-10-CM

## 2018-04-05 PROCEDURE — 99214 OFFICE O/P EST MOD 30 MIN: CPT | Performed by: INTERNAL MEDICINE

## 2018-04-05 RX ORDER — MIDODRINE HYDROCHLORIDE 5 MG/1
5 TABLET ORAL EVERY OTHER DAY
COMMUNITY
End: 2018-06-20 | Stop reason: DRUGHIGH

## 2018-04-26 ENCOUNTER — TELEPHONE (OUTPATIENT)
Dept: CARDIOLOGY CLINIC | Age: 83
End: 2018-04-26

## 2018-06-12 ENCOUNTER — TELEPHONE (OUTPATIENT)
Dept: CARDIOLOGY CLINIC | Age: 83
End: 2018-06-12

## 2018-06-18 ENCOUNTER — OFFICE VISIT (OUTPATIENT)
Dept: CARDIOLOGY CLINIC | Age: 83
End: 2018-06-18

## 2018-06-18 ENCOUNTER — TELEPHONE (OUTPATIENT)
Dept: CARDIOLOGY CLINIC | Age: 83
End: 2018-06-18

## 2018-06-18 VITALS
WEIGHT: 114 LBS | HEIGHT: 61 IN | HEART RATE: 86 BPM | SYSTOLIC BLOOD PRESSURE: 108 MMHG | BODY MASS INDEX: 21.52 KG/M2 | DIASTOLIC BLOOD PRESSURE: 60 MMHG

## 2018-06-18 DIAGNOSIS — R41.0 CONFUSION: Primary | ICD-10-CM

## 2018-06-18 DIAGNOSIS — Z95.0 CARDIAC PACEMAKER IN SITU: ICD-10-CM

## 2018-06-18 PROCEDURE — 99214 OFFICE O/P EST MOD 30 MIN: CPT | Performed by: INTERNAL MEDICINE

## 2018-06-18 PROCEDURE — 93280 PM DEVICE PROGR EVAL DUAL: CPT | Performed by: INTERNAL MEDICINE

## 2018-06-20 ENCOUNTER — TELEPHONE (OUTPATIENT)
Dept: CARDIOLOGY CLINIC | Age: 83
End: 2018-06-20

## 2018-06-20 RX ORDER — MIDODRINE HYDROCHLORIDE 5 MG/1
5 TABLET ORAL 3 TIMES DAILY
Qty: 90 TABLET | Refills: 5 | Status: SHIPPED | OUTPATIENT
Start: 2018-06-20 | End: 2018-09-17 | Stop reason: SDUPTHER

## 2018-06-20 RX ORDER — MIDODRINE HYDROCHLORIDE 5 MG/1
5 TABLET ORAL 3 TIMES DAILY
COMMUNITY
End: 2018-06-20 | Stop reason: SDUPTHER

## 2018-06-26 ENCOUNTER — HOSPITAL ENCOUNTER (OUTPATIENT)
Dept: GENERAL RADIOLOGY | Age: 83
Discharge: HOME | End: 2018-06-26
Attending: INTERNAL MEDICINE | Admitting: INTERNAL MEDICINE

## 2018-06-26 LAB
ANION GAP SERPL CALCULATED.3IONS-SCNC: 14 MMOL/L (ref 4–16)
BACTERIA: ABNORMAL /HPF
BASOPHILS ABSOLUTE: 0.1 K/CU MM
BASOPHILS RELATIVE PERCENT: 0.7 % (ref 0–1)
BILIRUBIN URINE: NEGATIVE MG/DL
BLOOD, URINE: ABNORMAL
BUN BLDV-MCNC: 17 MG/DL (ref 6–23)
CALCIUM SERPL-MCNC: 9.5 MG/DL (ref 8.3–10.6)
CHLORIDE BLD-SCNC: 100 MMOL/L (ref 99–110)
CLARITY: ABNORMAL
CO2: 28 MMOL/L (ref 21–32)
COLOR: YELLOW
CREAT SERPL-MCNC: 0.9 MG/DL (ref 0.6–1.1)
DIFFERENTIAL TYPE: ABNORMAL
EOSINOPHILS ABSOLUTE: 0.2 K/CU MM
EOSINOPHILS RELATIVE PERCENT: 2 % (ref 0–3)
GFR AFRICAN AMERICAN: >60 ML/MIN/1.73M2
GFR NON-AFRICAN AMERICAN: 58 ML/MIN/1.73M2
GLUCOSE BLD-MCNC: 91 MG/DL (ref 70–99)
GLUCOSE, URINE: NEGATIVE MG/DL
HCT VFR BLD CALC: 42.5 % (ref 37–47)
HEMOGLOBIN: 13 GM/DL (ref 12.5–16)
IMMATURE NEUTROPHIL %: 0.3 % (ref 0–0.43)
KETONES, URINE: NEGATIVE MG/DL
LEUKOCYTE ESTERASE, URINE: ABNORMAL
LYMPHOCYTES ABSOLUTE: 1.6 K/CU MM
LYMPHOCYTES RELATIVE PERCENT: 18.9 % (ref 24–44)
MCH RBC QN AUTO: 30.4 PG (ref 27–31)
MCHC RBC AUTO-ENTMCNC: 30.6 % (ref 32–36)
MCV RBC AUTO: 99.5 FL (ref 78–100)
MONOCYTES ABSOLUTE: 0.6 K/CU MM
MONOCYTES RELATIVE PERCENT: 6.4 % (ref 0–4)
MUCUS: ABNORMAL HPF
NITRITE URINE, QUANTITATIVE: NEGATIVE
NUCLEATED RBC %: 0 %
PDW BLD-RTO: 15 % (ref 11.7–14.9)
PH, URINE: 5 (ref 5–8)
PLATELET # BLD: 164 K/CU MM (ref 140–440)
PMV BLD AUTO: 11 FL (ref 7.5–11.1)
POTASSIUM SERPL-SCNC: 3.9 MMOL/L (ref 3.5–5.1)
PROTEIN UA: NEGATIVE MG/DL
RBC # BLD: 4.27 M/CU MM (ref 4.2–5.4)
RBC URINE: 2 /HPF (ref 0–6)
SEGMENTED NEUTROPHILS ABSOLUTE COUNT: 6.2 K/CU MM
SEGMENTED NEUTROPHILS RELATIVE PERCENT: 71.7 % (ref 36–66)
SODIUM BLD-SCNC: 142 MMOL/L (ref 135–145)
SPECIFIC GRAVITY UA: 1.01 (ref 1–1.03)
SQUAMOUS EPITHELIAL: <1 /HPF
TOTAL IMMATURE NEUTOROPHIL: 0.03 K/CU MM
TOTAL NUCLEATED RBC: 0 K/CU MM
TRICHOMONAS: ABNORMAL /HPF
UROBILINOGEN, URINE: NORMAL MG/DL (ref 0.2–1)
WBC # BLD: 8.6 K/CU MM (ref 4–10.5)
WBC UA: 6 /HPF (ref 0–5)

## 2018-06-27 ENCOUNTER — TELEPHONE (OUTPATIENT)
Dept: CARDIOLOGY CLINIC | Age: 83
End: 2018-06-27

## 2018-06-27 ENCOUNTER — OFFICE VISIT (OUTPATIENT)
Dept: FAMILY MEDICINE CLINIC | Age: 83
End: 2018-06-27

## 2018-06-27 VITALS — HEART RATE: 108 BPM | DIASTOLIC BLOOD PRESSURE: 76 MMHG | SYSTOLIC BLOOD PRESSURE: 110 MMHG | TEMPERATURE: 98.1 F

## 2018-06-27 DIAGNOSIS — R82.90 ABNORMAL URINALYSIS: Primary | ICD-10-CM

## 2018-06-27 DIAGNOSIS — R41.3 MEMORY LOSS: ICD-10-CM

## 2018-06-27 DIAGNOSIS — M20.41 HAMMER TOES OF BOTH FEET: ICD-10-CM

## 2018-06-27 DIAGNOSIS — M20.42 HAMMER TOES OF BOTH FEET: ICD-10-CM

## 2018-06-27 LAB
BILIRUBIN, POC: NEGATIVE
BLOOD URINE, POC: NORMAL
CLARITY, POC: CLEAR
COLOR, POC: NORMAL
GLUCOSE URINE, POC: NEGATIVE
KETONES, POC: NEGATIVE
LEUKOCYTE EST, POC: NEGATIVE
NITRITE, POC: NORMAL
PH, POC: 5.5
PROTEIN, POC: NEGATIVE
SPECIFIC GRAVITY, POC: 1.01
UROBILINOGEN, POC: NORMAL

## 2018-06-27 PROCEDURE — 81003 URINALYSIS AUTO W/O SCOPE: CPT | Performed by: FAMILY MEDICINE

## 2018-06-27 PROCEDURE — 99214 OFFICE O/P EST MOD 30 MIN: CPT | Performed by: FAMILY MEDICINE

## 2018-06-28 ASSESSMENT — ENCOUNTER SYMPTOMS: RESPIRATORY NEGATIVE: 1

## 2018-06-29 ENCOUNTER — TELEPHONE (OUTPATIENT)
Dept: FAMILY MEDICINE CLINIC | Age: 83
End: 2018-06-29

## 2018-06-29 LAB — URINE CULTURE, ROUTINE: NORMAL

## 2018-07-08 DIAGNOSIS — I48.19 PERSISTENT ATRIAL FIBRILLATION (HCC): ICD-10-CM

## 2018-07-09 ENCOUNTER — HOSPITAL ENCOUNTER (OUTPATIENT)
Dept: CT IMAGING | Age: 83
Discharge: HOME | End: 2018-07-09
Attending: FAMILY MEDICINE | Admitting: FAMILY MEDICINE

## 2018-07-09 DIAGNOSIS — F01.50 VASCULAR DEMENTIA WITH DELIRIUM (HCC): ICD-10-CM

## 2018-07-09 DIAGNOSIS — F05 VASCULAR DEMENTIA WITH DELIRIUM (HCC): ICD-10-CM

## 2018-07-09 RX ORDER — FUROSEMIDE 20 MG/1
TABLET ORAL
Qty: 360 TABLET | Refills: 0 | OUTPATIENT
Start: 2018-07-09

## 2018-07-10 ENCOUNTER — TELEPHONE (OUTPATIENT)
Dept: CARDIOLOGY CLINIC | Age: 83
End: 2018-07-10

## 2018-07-17 ENCOUNTER — OFFICE VISIT (OUTPATIENT)
Dept: CARDIOLOGY CLINIC | Age: 83
End: 2018-07-17

## 2018-07-17 VITALS
DIASTOLIC BLOOD PRESSURE: 62 MMHG | HEIGHT: 62 IN | HEART RATE: 78 BPM | SYSTOLIC BLOOD PRESSURE: 116 MMHG | WEIGHT: 113 LBS | BODY MASS INDEX: 20.8 KG/M2

## 2018-07-17 DIAGNOSIS — I48.91 ATRIAL FIBRILLATION, UNSPECIFIED TYPE (HCC): Primary | ICD-10-CM

## 2018-07-17 PROCEDURE — 99214 OFFICE O/P EST MOD 30 MIN: CPT | Performed by: INTERNAL MEDICINE

## 2018-07-17 NOTE — PROGRESS NOTES
H/O echocardiogram 05/08/2017    EF 60%     History of CT scan of head 07/09/2018    No acute intracranial abnormality. Diffuse atrophic changes with findings suggesting chronic microvascular ischemia.  History of echocardiogram 09/26/2016    EF60%-moderate AS, moderate MR, MILD AI, enlarged LA, MODERATE TR    IBS (irritable bowel syndrome)     Mitral regurgitation     moderate severity; Tricuspid Regurgitation: mild severity    Osteoarthritis     primarily affecting the neck, fingers and knees    Osteoporosis     Pedal edema      Past Surgical History:   Procedure Laterality Date    APPENDECTOMY      with choley    BREAST BIOPSY  1950's    left breast - negative for cancer    CARDIOVASCULAR STRESS TEST  11/2007    treadmill    CATARACT REMOVAL  12/2008    Left    CHOLECYSTECTOMY  unknown    COLONOSCOPY  2010    HERNIA REPAIR  1986    left inguinal    INGUINAL HERNIA REPAIR  08-20-12    Right Side     PACEMAKER INSERTION  03/14/14    L upper chest    TONSILLECTOMY  23years old.  VARICOSE VEIN SURGERY      stripping bilat legs     Family History   Problem Relation Age of Onset    High Blood Pressure Sister     Other Sister         sinus problems, vericose veins    Other Father         GI probloems - hx obstruction    Heart Disease Sister         CHF    Diabetes Sister         s/p partial pancreas removal    Early Death Daughter 40        unknown cause    Asthma Son     Cancer Brother         leukemia    Heart Disease Brother         CABG    Early Death Brother         Murdered    Other Brother         dies at 80. unknown cause.     Cancer Brother         throat    Early Death Brother 5        pneumonia     Social History   Substance Use Topics    Smoking status: Never Smoker    Smokeless tobacco: Never Used    Alcohol use No      [unfilled]  Review of Systems:   · Constitutional: No Fever or Weight Loss   · Eyes: No Decreased Vision  · ENT: No Headaches, Hearing Loss or AS LAST YR, will repeat ECHO NEXT YR  7. GERD: [facility] Health maintenance: exerise and diet  All labs, medications and tests reviewed, continue all other medications of all above medical condition listed as is.     [unfilled]

## 2018-09-07 ENCOUNTER — TELEPHONE (OUTPATIENT)
Dept: FAMILY MEDICINE CLINIC | Age: 83
End: 2018-09-07

## 2018-09-07 ENCOUNTER — HOSPITAL ENCOUNTER (OUTPATIENT)
Dept: GENERAL RADIOLOGY | Age: 83
Discharge: HOME | End: 2018-09-07
Attending: INTERNAL MEDICINE | Admitting: INTERNAL MEDICINE

## 2018-09-07 ENCOUNTER — OFFICE VISIT (OUTPATIENT)
Dept: CARDIOLOGY CLINIC | Age: 83
End: 2018-09-07

## 2018-09-07 VITALS
HEIGHT: 62 IN | WEIGHT: 113 LBS | HEART RATE: 114 BPM | SYSTOLIC BLOOD PRESSURE: 106 MMHG | BODY MASS INDEX: 20.8 KG/M2 | DIASTOLIC BLOOD PRESSURE: 76 MMHG

## 2018-09-07 DIAGNOSIS — I48.19 PERSISTENT ATRIAL FIBRILLATION (HCC): ICD-10-CM

## 2018-09-07 DIAGNOSIS — R00.2 PALPITATIONS: ICD-10-CM

## 2018-09-07 LAB
ANION GAP SERPL CALCULATED.3IONS-SCNC: 11 MMOL/L (ref 4–16)
BASOPHILS ABSOLUTE: 0.1 K/CU MM
BASOPHILS RELATIVE PERCENT: 0.9 % (ref 0–1)
BUN BLDV-MCNC: 17 MG/DL (ref 6–23)
CALCIUM SERPL-MCNC: 9.8 MG/DL (ref 8.3–10.6)
CHLORIDE BLD-SCNC: 101 MMOL/L (ref 99–110)
CO2: 27 MMOL/L (ref 21–32)
CREAT SERPL-MCNC: 0.9 MG/DL (ref 0.6–1.1)
DIFFERENTIAL TYPE: ABNORMAL
EOSINOPHILS ABSOLUTE: 0.2 K/CU MM
EOSINOPHILS RELATIVE PERCENT: 1.8 % (ref 0–3)
GFR AFRICAN AMERICAN: >60 ML/MIN/1.73M2
GFR NON-AFRICAN AMERICAN: 58 ML/MIN/1.73M2
GLUCOSE BLD-MCNC: 97 MG/DL (ref 70–99)
HCT VFR BLD CALC: 42.3 % (ref 37–47)
HEMOGLOBIN: 13.4 GM/DL (ref 12.5–16)
IMMATURE NEUTROPHIL %: 0.3 % (ref 0–0.43)
LYMPHOCYTES ABSOLUTE: 1.6 K/CU MM
LYMPHOCYTES RELATIVE PERCENT: 16.9 % (ref 24–44)
MCH RBC QN AUTO: 31.3 PG (ref 27–31)
MCHC RBC AUTO-ENTMCNC: 31.7 % (ref 32–36)
MCV RBC AUTO: 98.8 FL (ref 78–100)
MONOCYTES ABSOLUTE: 0.7 K/CU MM
MONOCYTES RELATIVE PERCENT: 6.9 % (ref 0–4)
NUCLEATED RBC %: 0 %
PDW BLD-RTO: 15.4 % (ref 11.7–14.9)
PLATELET # BLD: 205 K/CU MM (ref 140–440)
PMV BLD AUTO: 10.6 FL (ref 7.5–11.1)
POTASSIUM SERPL-SCNC: 4.4 MMOL/L (ref 3.5–5.1)
RBC # BLD: 4.28 M/CU MM (ref 4.2–5.4)
SEGMENTED NEUTROPHILS ABSOLUTE COUNT: 6.9 K/CU MM
SEGMENTED NEUTROPHILS RELATIVE PERCENT: 73.2 % (ref 36–66)
SODIUM BLD-SCNC: 139 MMOL/L (ref 135–145)
TOTAL IMMATURE NEUTOROPHIL: 0.03 K/CU MM
TOTAL NUCLEATED RBC: 0 K/CU MM
WBC # BLD: 9.4 K/CU MM (ref 4–10.5)

## 2018-09-07 PROCEDURE — 99214 OFFICE O/P EST MOD 30 MIN: CPT | Performed by: INTERNAL MEDICINE

## 2018-09-07 PROCEDURE — 93000 ELECTROCARDIOGRAM COMPLETE: CPT | Performed by: INTERNAL MEDICINE

## 2018-09-07 RX ORDER — DIGOXIN 125 MCG
125 TABLET ORAL DAILY
Qty: 30 TABLET | Refills: 3 | Status: SHIPPED | OUTPATIENT
Start: 2018-09-07 | End: 2018-10-24 | Stop reason: SDUPTHER

## 2018-09-07 RX ORDER — DIGOXIN 125 MCG
125 TABLET ORAL DAILY
Qty: 30 TABLET | Refills: 3 | Status: SHIPPED | OUTPATIENT
Start: 2018-09-07 | End: 2018-09-07 | Stop reason: SDUPTHER

## 2018-09-07 NOTE — TELEPHONE ENCOUNTER
Marilu Garcia  P.O.A. called patient has been moaning and groaning at night and holding left side. This has been going on since last week patient denies any pain or problem. She stayed with patient last night and observed the moaning and groaning and holding the left side. Patient is also sleeping more. Verbal order DR. Daniela Kimble go to the Emergency Room. Per Marilu Petty the cardiologist has been putting patient on different medications and then discontinuing. Marilu Petty is also going to call the cardiologist about the change in medications.

## 2018-09-07 NOTE — PROGRESS NOTES
Campos Escoto MD        OFFICE  FOLLOWUP NOTE    Chief complaints: patient is here for management of CHF, PPM,AFIB, DIZZINESS, EPISTAXIS    Subjective: + shortness of breath, no dizziness, no palpitations    HPI Angeles Devi is a 80 y. o.year old who  has a past medical history of Atrial fibrillation and flutter ([facility] [address] CHF (congestive heart failure) ([facility] [address] Diverticulosis; Enlarged thyroid; GERD (gastroesophageal reflux disease); H/O echocardiogram; History of CT scan of head; History of echocardiogram; IBS (irritable bowel syndrome); Mitral regurgitation; Osteoarthritis; Osteoporosis; and Pedal edema. and presents for management of CHF, PPM,AFIB, DIZZINESS, EPISTAXIS which are well controlled, patient is little short of breath, she has orthopnea also, she is accompanied by her daughter who fills up with the history. No chest pain. Last time her dig was stopped to help her confusion and she was on midodrine,now she is tachycardic. Current Outpatient Prescriptions   Medication Sig Dispense Refill    midodrine (PROAMATINE) 5 MG tablet Take 1 tablet by mouth 3 times daily 90 tablet 5    apixaban (ELIQUIS) 2.5 MG TABS tablet Take 1 tablet by mouth 2 times daily 60 tablet 3    apixaban (ELIQUIS) 2.5 MG TABS tablet Take 1 tablet by mouth 2 times daily 56 tablet 0    furosemide (LASIX) 20 MG tablet Take 1 tablet by mouth 2 times daily (Patient taking differently: Take 20 mg by mouth daily 2 tab) 180 tablet 3    ammonium lactate (LAC-HYDRIN) 12 % lotion Apply topically 2 times daily 1 Bottle 11    sodium chloride (ALTAMIST SPRAY) 0.65 % nasal spray 2 sprays by Nasal route 4 times daily 1 Bottle 3    calcium carbonate (OSCAL) 500 MG TABS tablet Take 500 mg by mouth 2 times daily        No current facility-administered medications for this visit. Allergies: Patient has no known allergies.   Past Medical History:   Diagnosis Date    Atrial fibrillation and flutter (HCC)     CHF (congestive heart failure) ([facility] [address]     Diverticulosis     Enlarged thyroid     GERD (gastroesophageal reflux disease)     H/O echocardiogram 05/08/2017    EF 60%     History of CT scan of head 07/09/2018    No acute intracranial abnormality. Diffuse atrophic changes with findings suggesting chronic microvascular ischemia.  History of echocardiogram 09/26/2016    EF60%-moderate AS, moderate MR, MILD AI, enlarged LA, MODERATE TR    IBS (irritable bowel syndrome)     Mitral regurgitation     moderate severity; Tricuspid Regurgitation: mild severity    Osteoarthritis     primarily affecting the neck, fingers and knees    Osteoporosis     Pedal edema      Past Surgical History:   Procedure Laterality Date    APPENDECTOMY      with choley    BREAST BIOPSY  1950's    left breast - negative for cancer    CARDIOVASCULAR STRESS TEST  11/2007    treadmill    CATARACT REMOVAL  12/2008    Left    CHOLECYSTECTOMY  unknown    COLONOSCOPY  2010    HERNIA REPAIR  1986    left inguinal    INGUINAL HERNIA REPAIR  08-20-12    Right Side     PACEMAKER INSERTION  03/14/14    L upper chest    TONSILLECTOMY  23years old.  VARICOSE VEIN SURGERY      stripping bilat legs     Family History   Problem Relation Age of Onset    High Blood Pressure Sister     Other Sister         sinus problems, vericose veins    Other Father         GI probloems - hx obstruction    Heart Disease Sister         CHF    Diabetes Sister         s/p partial pancreas removal    Early Death Daughter 40        unknown cause    Asthma Son     Cancer Brother         leukemia    Heart Disease Brother         CABG    Early Death Brother         Murdered    Other Brother         dies at 80. unknown cause.     Cancer Brother         throat    Early Death Brother 5        pneumonia     Social History   Substance Use Topics    Smoking status: Never Smoker    Smokeless tobacco: Never Used    Alcohol use Jaye      [unfilled]  Review of Systems:

## 2018-09-17 ENCOUNTER — TELEPHONE (OUTPATIENT)
Dept: CARDIOLOGY CLINIC | Age: 83
End: 2018-09-17

## 2018-09-17 ENCOUNTER — OFFICE VISIT (OUTPATIENT)
Dept: CARDIOLOGY CLINIC | Age: 83
End: 2018-09-17

## 2018-09-17 VITALS
DIASTOLIC BLOOD PRESSURE: 76 MMHG | BODY MASS INDEX: 21.14 KG/M2 | HEART RATE: 100 BPM | SYSTOLIC BLOOD PRESSURE: 126 MMHG | WEIGHT: 112 LBS | HEIGHT: 61 IN

## 2018-09-17 DIAGNOSIS — I48.19 PERSISTENT ATRIAL FIBRILLATION (HCC): ICD-10-CM

## 2018-09-17 PROCEDURE — 99214 OFFICE O/P EST MOD 30 MIN: CPT | Performed by: INTERNAL MEDICINE

## 2018-09-17 RX ORDER — MIDODRINE HYDROCHLORIDE 5 MG/1
5 TABLET ORAL DAILY
Qty: 90 TABLET | Refills: 5 | Status: SHIPPED | OUTPATIENT
Start: 2018-09-17 | End: 2018-09-28

## 2018-09-17 RX ORDER — FUROSEMIDE 20 MG/1
60 TABLET ORAL 2 TIMES DAILY
Qty: 180 TABLET | Refills: 3 | Status: SHIPPED | OUTPATIENT
Start: 2018-09-17 | End: 2018-09-17 | Stop reason: SDUPTHER

## 2018-09-17 NOTE — PROGRESS NOTES
thyroid     GERD (gastroesophageal reflux disease)     H/O echocardiogram 05/08/2017    EF 60%     History of CT scan of head 07/09/2018    No acute intracranial abnormality. Diffuse atrophic changes with findings suggesting chronic microvascular ischemia.  History of echocardiogram 09/26/2016    EF60%-moderate AS, moderate MR, MILD AI, enlarged LA, MODERATE TR    IBS (irritable bowel syndrome)     Mitral regurgitation     moderate severity; Tricuspid Regurgitation: mild severity    Osteoarthritis     primarily affecting the neck, fingers and knees    Osteoporosis     Pedal edema      Past Surgical History:   Procedure Laterality Date    APPENDECTOMY      with choley    BREAST BIOPSY  1950's    left breast - negative for cancer    CARDIOVASCULAR STRESS TEST  11/2007    treadmill    CATARACT REMOVAL  12/2008    Left    CHOLECYSTECTOMY  unknown    COLONOSCOPY  2010    HERNIA REPAIR  1986    left inguinal    INGUINAL HERNIA REPAIR  08-20-12    Right Side     PACEMAKER INSERTION  03/14/14    L upper chest    TONSILLECTOMY  23years old.  VARICOSE VEIN SURGERY      stripping bilat legs     Family History   Problem Relation Age of Onset    High Blood Pressure Sister     Other Sister         sinus problems, vericose veins    Other Father         GI probloems - hx obstruction    Heart Disease Sister         CHF    Diabetes Sister         s/p partial pancreas removal    Early Death Daughter 40        unknown cause    Asthma Son     Cancer Brother         leukemia    Heart Disease Brother         CABG    Early Death Brother         Murdered    Other Brother         dies at 80. unknown cause.     Cancer Brother         throat    Early Death Brother 5        pneumonia     Social History   Substance Use Topics    Smoking status: Never Smoker    Smokeless tobacco: Never Used    Alcohol use No      [unfilled]  Review of Systems:   · Constitutional: No Fever or Weight Loss   · Eyes: No pedal pulses are normal  Femoral pulses have normal amplitude, no bruits   Extremities - No cyanosis, clubbing, or significant edema, no varicose veins    Abdomen  No masses, tenderness, or organomegaly, no hepato-splenomegally, no bruits  Musculoskeletal  No significant edema, no kyphosis or scoliosis, no deformity in any extremity noted, muscle strength and tone are normal  Skin: no ulcer,no scar,no stasis dermatitis   Neurologic  alert oriented times 3,Cranial nerves II through XII are grossly intact. There were no gross focal neurologic abnormalities. All sensory and motor nerves examined and were normal  Psychiatric: normal mood and affect    Lab Results   Component Value Date    TROPONINI 0.250 04/23/2014     BNP:    Lab Results   Component Value Date    BNP 1,086 04/22/2014     PT/INR:  No results found for: PTINR  No results found for: LABA1C  Lab Results   Component Value Date    CHOL 122 03/14/2014    TRIG 60 03/14/2014    HDL 41 (L) 03/14/2014    LDLCALC 114 (H) 05/12/2012    LDLDIRECT 73 03/14/2014     Lab Results   Component Value Date    ALT 11 10/31/2017    AST 25 10/31/2017     TSH:    Lab Results   Component Value Date    TSH 0.64 10/31/2017       Impression:  Hilario Gamboa is a 80 y. o.year old who  has a past medical history of Atrial fibrillation and flutter ([facility] [address] CHF (congestive heart failure) ([facility] [address] Diverticulosis; Enlarged thyroid; GERD (gastroesophageal reflux disease); H/O echocardiogram; History of CT scan of head; History of echocardiogram; IBS (irritable bowel syndrome); Mitral regurgitation; Osteoarthritis; Osteoporosis; and Pedal edema. and presents with     Plan:  1. PAF: rate BETTER controlled, CONTINUE digoxin,  2. Shortness of breath:CXR SHOWED MILD CHF, MASSIVE CARDIOMEGALLY,WILL INCREASE lasix to daily,   3.  Tachycardia: midodrine is decreased to once a day and the goal is to stop it eventually  4. Epistaxis: off aspirin, stable, use humidifier and may use vaseline on the nose  5. PPM: normal functioning, check as per schedule  6. CHF and aortic stenosis: MILD AS LAST YR, will repeat ECHO NEXT YR  7. GERD: StableHealth maintenance: exerise and diet  All labs, medications and tests reviewed, continue all other medications of all above medical condition listed as is.     [unfilled]

## 2018-09-18 ENCOUNTER — TELEPHONE (OUTPATIENT)
Dept: CARDIOLOGY CLINIC | Age: 83
End: 2018-09-18

## 2018-09-18 RX ORDER — FUROSEMIDE 20 MG/1
60 TABLET ORAL 2 TIMES DAILY
Qty: 540 TABLET | Refills: 3 | Status: SHIPPED | OUTPATIENT
Start: 2018-09-18 | End: 2018-09-19 | Stop reason: DRUGHIGH

## 2018-09-18 NOTE — TELEPHONE ENCOUNTER
pts daughter calling to clarify medications. Pt was here on Monday for appt. Digoxin was 1 tablet every other day  Now 1 tablet  Daily  Furosemide was 2 tablets a day      Now 3 tablets 2 times daily Marie Gambino is unsure about this dosage  Midodrine  Now 1 tablet daily      Please call pts daughter ameya and verify dosage of these medicatons.

## 2018-09-19 RX ORDER — FUROSEMIDE 20 MG/1
60 TABLET ORAL 2 TIMES DAILY
COMMUNITY
End: 2018-09-28 | Stop reason: DRUGHIGH

## 2018-09-19 RX ORDER — DIGOXIN 125 MCG
125 TABLET ORAL DAILY
COMMUNITY
End: 2018-09-19 | Stop reason: SDUPTHER

## 2018-09-19 RX ORDER — FUROSEMIDE 20 MG/1
60 TABLET ORAL 2 TIMES DAILY
COMMUNITY
End: 2018-09-19 | Stop reason: DRUGHIGH

## 2018-09-19 NOTE — TELEPHONE ENCOUNTER
Discussed medications with Cindy Burr updated patients medication profile will send a copy of her new list to her home address [address] [address], [place] [address]. Printed medication profile and placed in mail to go out tomorrow morning.

## 2018-09-20 ENCOUNTER — TELEPHONE (OUTPATIENT)
Dept: CARDIOLOGY CLINIC | Age: 83
End: 2018-09-20

## 2018-09-20 NOTE — TELEPHONE ENCOUNTER
Returned [employer] phone call and called home phone. Instructed her to have the residence take Uzma's blood pressure before medications then 1 hour after she has taken her medications today and tomorrow. Take her blood pressure tonight and then tomorrow at 2:00 and give me a call with the readings. Faxed some information per her request to her residence. Briseida Ramirez verbalized understanding of all information given. She will give me a call back tomorrow with readings.

## 2018-09-22 ENCOUNTER — APPOINTMENT (OUTPATIENT)
Dept: CT IMAGING | Age: 83
End: 2018-09-22
Payer: COMMERCIAL

## 2018-09-22 ENCOUNTER — APPOINTMENT (OUTPATIENT)
Dept: GENERAL RADIOLOGY | Age: 83
End: 2018-09-22
Payer: COMMERCIAL

## 2018-09-22 ENCOUNTER — HOSPITAL ENCOUNTER (EMERGENCY)
Age: 83
Discharge: ANOTHER HOSPITAL | End: 2018-09-22
Attending: EMERGENCY MEDICINE
Payer: COMMERCIAL

## 2018-09-22 VITALS
SYSTOLIC BLOOD PRESSURE: 125 MMHG | TEMPERATURE: 97.6 F | OXYGEN SATURATION: 95 % | BODY MASS INDEX: 23.41 KG/M2 | HEART RATE: 76 BPM | WEIGHT: 124 LBS | HEIGHT: 61 IN | RESPIRATION RATE: 21 BRPM | DIASTOLIC BLOOD PRESSURE: 75 MMHG

## 2018-09-22 DIAGNOSIS — I50.9 ACUTE ON CHRONIC CONGESTIVE HEART FAILURE, UNSPECIFIED HEART FAILURE TYPE (HCC): ICD-10-CM

## 2018-09-22 DIAGNOSIS — R77.8 ELEVATED TROPONIN: ICD-10-CM

## 2018-09-22 DIAGNOSIS — R41.82 ALTERED MENTAL STATUS, UNSPECIFIED ALTERED MENTAL STATUS TYPE: ICD-10-CM

## 2018-09-22 DIAGNOSIS — I60.9 SAH (SUBARACHNOID HEMORRHAGE) (HCC): Primary | ICD-10-CM

## 2018-09-22 LAB
ALBUMIN SERPL-MCNC: 4.2 GM/DL (ref 3.4–5)
ALP BLD-CCNC: 56 IU/L (ref 40–129)
ALT SERPL-CCNC: 17 U/L (ref 10–40)
AMMONIA: 31 UMOL/L (ref 11–51)
ANION GAP SERPL CALCULATED.3IONS-SCNC: 15 MMOL/L (ref 4–16)
AST SERPL-CCNC: 31 IU/L (ref 15–37)
BACTERIA: NEGATIVE /HPF
BASOPHILS ABSOLUTE: 0.1 K/CU MM
BASOPHILS RELATIVE PERCENT: 0.6 % (ref 0–1)
BILIRUB SERPL-MCNC: 1.4 MG/DL (ref 0–1)
BILIRUBIN URINE: NEGATIVE MG/DL
BLOOD, URINE: NEGATIVE
BUN BLDV-MCNC: 15 MG/DL (ref 6–23)
CALCIUM SERPL-MCNC: 9.1 MG/DL (ref 8.3–10.6)
CHLORIDE BLD-SCNC: 99 MMOL/L (ref 99–110)
CLARITY: CLEAR
CO2: 23 MMOL/L (ref 21–32)
COLOR: YELLOW
CREAT SERPL-MCNC: 0.9 MG/DL (ref 0.6–1.1)
DIFFERENTIAL TYPE: ABNORMAL
EOSINOPHILS ABSOLUTE: 0 K/CU MM
EOSINOPHILS RELATIVE PERCENT: 0.1 % (ref 0–3)
GFR AFRICAN AMERICAN: >60 ML/MIN/1.73M2
GFR NON-AFRICAN AMERICAN: 58 ML/MIN/1.73M2
GLUCOSE BLD-MCNC: 94 MG/DL (ref 70–99)
GLUCOSE, URINE: NEGATIVE MG/DL
HCT VFR BLD CALC: 45.7 % (ref 37–47)
HEMOGLOBIN: 14.5 GM/DL (ref 12.5–16)
IMMATURE NEUTROPHIL %: 0.3 % (ref 0–0.43)
KETONES, URINE: NEGATIVE MG/DL
LACTATE: 3.1 MMOL/L (ref 0.4–2)
LEUKOCYTE ESTERASE, URINE: ABNORMAL
LYMPHOCYTES ABSOLUTE: 1 K/CU MM
LYMPHOCYTES RELATIVE PERCENT: 7.6 % (ref 24–44)
MCH RBC QN AUTO: 31.7 PG (ref 27–31)
MCHC RBC AUTO-ENTMCNC: 31.7 % (ref 32–36)
MCV RBC AUTO: 99.8 FL (ref 78–100)
MONOCYTES ABSOLUTE: 0.8 K/CU MM
MONOCYTES RELATIVE PERCENT: 6.4 % (ref 0–4)
NITRITE URINE, QUANTITATIVE: NEGATIVE
NUCLEATED RBC %: 0 %
PDW BLD-RTO: 14.4 % (ref 11.7–14.9)
PH, URINE: 8 (ref 5–8)
PLATELET # BLD: 183 K/CU MM (ref 140–440)
PMV BLD AUTO: 10.6 FL (ref 7.5–11.1)
POTASSIUM SERPL-SCNC: 4.1 MMOL/L (ref 3.5–5.1)
PRO-BNP: 2470 PG/ML
PROTEIN UA: NEGATIVE MG/DL
RBC # BLD: 4.58 M/CU MM (ref 4.2–5.4)
RBC URINE: 2 /HPF (ref 0–6)
REASON FOR REJECTION: NORMAL
REJECTED TEST: NORMAL
SEGMENTED NEUTROPHILS ABSOLUTE COUNT: 11 K/CU MM
SEGMENTED NEUTROPHILS RELATIVE PERCENT: 85 % (ref 36–66)
SODIUM BLD-SCNC: 137 MMOL/L (ref 135–145)
SOURCE: NORMAL
SPECIFIC GRAVITY UA: 1.01 (ref 1–1.03)
SQUAMOUS EPITHELIAL: <1 /HPF
TOTAL IMMATURE NEUTOROPHIL: 0.04 K/CU MM
TOTAL NUCLEATED RBC: 0 K/CU MM
TOTAL PROTEIN: 7.3 GM/DL (ref 6.4–8.2)
TRICHOMONAS: ABNORMAL /HPF
TROPONIN T: 0.02 NG/ML
UROBILINOGEN, URINE: NORMAL MG/DL (ref 0.2–1)
WBC # BLD: 12.9 K/CU MM (ref 4–10.5)
WBC UA: 6 /HPF (ref 0–5)

## 2018-09-22 PROCEDURE — 93005 ELECTROCARDIOGRAM TRACING: CPT | Performed by: PHYSICIAN ASSISTANT

## 2018-09-22 PROCEDURE — 87086 URINE CULTURE/COLONY COUNT: CPT

## 2018-09-22 PROCEDURE — 36415 COLL VENOUS BLD VENIPUNCTURE: CPT

## 2018-09-22 PROCEDURE — 6360000002 HC RX W HCPCS: Performed by: PHYSICIAN ASSISTANT

## 2018-09-22 PROCEDURE — 80053 COMPREHEN METABOLIC PANEL: CPT

## 2018-09-22 PROCEDURE — 81001 URINALYSIS AUTO W/SCOPE: CPT

## 2018-09-22 PROCEDURE — 96365 THER/PROPH/DIAG IV INF INIT: CPT

## 2018-09-22 PROCEDURE — 82140 ASSAY OF AMMONIA: CPT

## 2018-09-22 PROCEDURE — 2580000003 HC RX 258: Performed by: PHYSICIAN ASSISTANT

## 2018-09-22 PROCEDURE — 83880 ASSAY OF NATRIURETIC PEPTIDE: CPT

## 2018-09-22 PROCEDURE — 84484 ASSAY OF TROPONIN QUANT: CPT

## 2018-09-22 PROCEDURE — 87040 BLOOD CULTURE FOR BACTERIA: CPT

## 2018-09-22 PROCEDURE — 99285 EMERGENCY DEPT VISIT HI MDM: CPT

## 2018-09-22 PROCEDURE — 83605 ASSAY OF LACTIC ACID: CPT

## 2018-09-22 PROCEDURE — 85025 COMPLETE CBC W/AUTO DIFF WBC: CPT

## 2018-09-22 PROCEDURE — 70450 CT HEAD/BRAIN W/O DYE: CPT

## 2018-09-22 PROCEDURE — 96361 HYDRATE IV INFUSION ADD-ON: CPT

## 2018-09-22 PROCEDURE — 71045 X-RAY EXAM CHEST 1 VIEW: CPT

## 2018-09-22 RX ORDER — VANCOMYCIN HYDROCHLORIDE 1 G/200ML
1000 INJECTION, SOLUTION INTRAVENOUS ONCE
Status: DISCONTINUED | OUTPATIENT
Start: 2018-09-22 | End: 2018-09-22 | Stop reason: HOSPADM

## 2018-09-22 RX ORDER — SODIUM CHLORIDE 9 MG/ML
INJECTION, SOLUTION INTRAVENOUS CONTINUOUS
Status: DISCONTINUED | OUTPATIENT
Start: 2018-09-22 | End: 2018-09-22 | Stop reason: HOSPADM

## 2018-09-22 RX ADMIN — SODIUM CHLORIDE: 9 INJECTION, SOLUTION INTRAVENOUS at 12:20

## 2018-09-22 RX ADMIN — TAZOBACTAM SODIUM AND PIPERACILLIN SODIUM 3.38 G: 375; 3 INJECTION, SOLUTION INTRAVENOUS at 14:29

## 2018-09-22 NOTE — ED PROVIDER NOTES
speech problems, no headache   Psychiatric:  No anxiety  Genitourinary:  No dysuria, no hematuria  Extremities:  No edema    Past Medical History:   Diagnosis Date    Atrial fibrillation and flutter (HCC)     CHF (congestive heart failure) (HCC)     Diverticulosis     Enlarged thyroid     GERD (gastroesophageal reflux disease)     H/O echocardiogram 05/08/2017    EF 60%     History of CT scan of head 07/09/2018    No acute intracranial abnormality. Diffuse atrophic changes with findings suggesting chronic microvascular ischemia.  History of echocardiogram 09/26/2016    EF60%-moderate AS, moderate MR, MILD AI, enlarged LA, MODERATE TR    IBS (irritable bowel syndrome)     Mitral regurgitation     moderate severity; Tricuspid Regurgitation: mild severity    Osteoarthritis     primarily affecting the neck, fingers and knees    Osteoporosis     Pedal edema      Past Surgical History:   Procedure Laterality Date    APPENDECTOMY      with choley    BREAST BIOPSY  1950's    left breast - negative for cancer    CARDIOVASCULAR STRESS TEST  11/2007    treadmill    CATARACT REMOVAL  12/2008    Left    CHOLECYSTECTOMY  unknown    COLONOSCOPY  2010    HERNIA REPAIR  1986    left inguinal    INGUINAL HERNIA REPAIR  08-20-12    Right Side     PACEMAKER INSERTION  03/14/14    L upper chest    TONSILLECTOMY  23years old.  VARICOSE VEIN SURGERY      stripping bilat legs     Family History   Problem Relation Age of Onset    High Blood Pressure Sister     Other Sister         sinus problems, vericose veins    Other Father         GI probloems - hx obstruction    Heart Disease Sister         CHF    Diabetes Sister         s/p partial pancreas removal    Early Death Daughter 40        unknown cause    Asthma Son     Cancer Brother         leukemia    Heart Disease Brother         CABG    Early Death Brother         Murdered    Other Brother         dies at 80. unknown cause.     Cancer Brother throat    Early Death Brother 5        pneumonia     Social History     Social History    Marital status:      Spouse name: N/A    Number of children: N/A    Years of education: N/A     Occupational History    Not on file.      Social History Main Topics    Smoking status: Never Smoker    Smokeless tobacco: Never Used    Alcohol use No    Drug use: No    Sexual activity: No     Other Topics Concern    Not on file     Social History Narrative    No narrative on file     Current Facility-Administered Medications   Medication Dose Route Frequency Provider Last Rate Last Dose    0.9 % sodium chloride infusion   Intravenous Continuous Korinne Lusterio, PA-C 100 mL/hr at 09/22/18 1220      vancomycin (VANCOCIN) 1000 mg in dextrose 5% 200 mL IVPB  1,000 mg Intravenous Once Michell Lizarraga PA-C        piperacillin-tazobactam (ZOSYN) 3.375 g in dextrose 50 mL IVPB (premix)  3.375 g Intravenous Q6H Korinne Lusterio, PA-C 100 mL/hr at 09/22/18 1429 3.375 g at 09/22/18 1429     Current Outpatient Prescriptions   Medication Sig Dispense Refill    furosemide (LASIX) 20 MG tablet Take 60 mg by mouth daily      midodrine (PROAMATINE) 5 MG tablet Take 1 tablet by mouth daily 90 tablet 5    digoxin (LANOXIN) 125 MCG tablet Take 1 tablet by mouth daily 30 tablet 3    apixaban (ELIQUIS) 2.5 MG TABS tablet Take 1 tablet by mouth 2 times daily 60 tablet 3    ammonium lactate (LAC-HYDRIN) 12 % lotion Apply topically 2 times daily 1 Bottle 11    sodium chloride (ALTAMIST SPRAY) 0.65 % nasal spray 2 sprays by Nasal route 4 times daily 1 Bottle 3    calcium carbonate (OSCAL) 500 MG TABS tablet Take 500 mg by mouth 2 times daily        No Known Allergies    Nursing Notes Reviewed  PHYSICAL EXAM    VITAL SIGNS: BP (!) 156/105   Pulse 88   Temp 97.6 °F (36.4 °C) (Rectal)   Resp 18   Ht 5' 1\" (1.549 m)   Wt 124 lb (56.2 kg)   SpO2 95%   BMI 23.43 kg/m²    Constitutional:  Well developed, Thin chronically ill appearing and fatigued, nontoxic. Head:  Normocephalic, Atraumatic  Eyes: PERRL. EOMI. Sclera clear. Conjunctiva normal, No discharge. No nystagmus  Neck/Lymphatics: Supple, no JVD, No lymphadenopathy  Cardiovascular:  RRR, Normal S1 & S2   Peripheral Vascular: Distal pulses 2+, Capillary refill <2seconds  Respiratory:  Respirations nonnlabored, 85% on room air. Coarse equal air exchange, noted rales. No wheezing or rhonchi. No retractions. Abdomen: Bowel sounds normal in all quadrants, Soft, Non tender/Nondistended, No palpable abdominal masses. Musculoskeletal: BUE/BLE symmetrical without atrophy or deformities. + 1equal pretibial pitting edema. Stable and nontender. Integument:  Warm, Dry, Intact, tachy mucous membranes. Neurologic:  Alert & oriented x3 , No focal deficits noted. GCS 13 - I contacted direction, slightly confused. Cranial nerves II through XII grossly intact. No slurred speech over patient does appear locally weak and fatigued, answers questions with very slow to response, intermittently confused with answers questions. Spontaneously moving all extremities. No pronator drift. 4/5  strength and dorsi/plantar flexion against resistance bilaterally. No facial droop.    Psychiatric:  Affect appropriate      I have reviewed and interpreted all of the currently available lab results from this visit (if applicable):  Results for orders placed or performed during the hospital encounter of 09/22/18   Urine Culture   Result Value Ref Range    Specimen URINE CATH     Special Requests NONE     Culture Pending     Report Status Pending    CMP   Result Value Ref Range    Sodium 137 135 - 145 MMOL/L    Potassium 4.1 3.5 - 5.1 MMOL/L    Chloride 99 99 - 110 mMol/L    CO2 23 21 - 32 MMOL/L    BUN 15 6 - 23 MG/DL    CREATININE 0.9 0.6 - 1.1 MG/DL    Glucose 94 70 - 99 MG/DL    Calcium 9.1 8.3 - 10.6 MG/DL    Alb 4.2 3.4 - 5.0 GM/DL    Total Protein 7.3 6.4 - 8.2 GM/DL    Total Bilirubin Neutrophil 0.04 K/CU MM    Immature Neutrophil % 0.3 0 - 0.43 %   EKG 12 Lead   Result Value Ref Range    Ventricular Rate 0 BPM    Atrial Rate 0 BPM    QRS Duration 0 ms    Q-T Interval 0 ms    QTc Calculation (Bazett) 0 ms    R Axis 0 degrees    T Axis 0 degrees    Diagnosis       ** No QRS complexes found, no ECG analysis possible **  No previous ECGs available          Radiographs (if obtained):  [] The following radiograph was interpreted by myself in the absence of a radiologist:   [] Radiologist's Report Reviewed:  CT Head WO Contrast   Preliminary Result   1. There is a new 4 mm hyperdensity along the right parasagittal posterior   parietal cortex which could represent a small subarachnoid hemorrhage or   cortical petechial hemorrhage. A hemorrhagic small metastatic lesion cannot   be excluded. Further evaluation with MRI of the brain with/without contrast   is recommended. Critical results were called by Dr. Katarina Deluca MD to Dr. Deysi Trejo  On   9/22/2018 at 14:26. XR CHEST PORTABLE   Preliminary Result   Persistently enlarged cardiomediastinal silhouette with findings suggestive   of pulmonary venous congestion. CT Head WO Contrast (Preliminary result)   Result time 09/22/18 14:26:51   Preliminary result by Taryn Bills MD (09/22/18 14:26:51)                Impression:    1. There is a new 4 mm hyperdensity along the right parasagittal posterior  parietal cortex which could represent a small subarachnoid hemorrhage or  cortical petechial hemorrhage.  A hemorrhagic small metastatic lesion cannot  be excluded.  Further evaluation with MRI of the brain with/without contrast  is recommended. Critical results were called by Dr. Katarina Deluca MD to Dr. Margareth Garcias  9/22/2018 at 14:26.                    XR CHEST PORTABLE (Preliminary result)   Result time 09/22/18 14:17:05   Preliminary result by Esmer Main MD (09/22/18 14:17:05)                Impression:    Persistently left pleural effusion and mild interstitial pulmonary edema suggesting mild CHF. ED COURSE & MEDICAL DECISION MAKING       Vital signs and nursing notes reviewed during ED course. I have independently evaluated this patient . Supervising physician - Dr. Everett Peck - was present in ED and available for consultation throughout entirety of patient's care. All pertinent Lab data and radiographic results reviewed with patient at bedside. The patient and/or the family were informed of the results of any tests/labs/imaging, the treatment plan, and time was allotted to answer questions. Clinical Impression:  1. SAH (subarachnoid hemorrhage) ([address]    2. Altered mental status, unspecified altered mental status type    3. Acute on chronic congestive heart failure, unspecified heart failure type (HCC)    4. Elevated troponin        Patient presents with reported fall and altered mental status. On exam, that she chronically thin ill-appearing 80 oral female, afebrile nontoxic, in no acute respiratory distress. Vitals are stable, 94% on room air. Patient is currently asymptomatic for any complaints. Follows directions appropriately, spontaneously moving all extremities with overall global weakness and fatigue in appearance. Abdomen soft nontender. Lungs with equal air exchange coarse breath sounds with noted rales. .  Patient was started on gentle IV fluid infusion given history of CHF. Leukocytosis 12.9, normal hemoglobin. Lactic acid is elevated at 3.1. BNP also elevated 2470 with evidence of vascular congestion worsening on chest x-ray. No acute ischemic changes on EKG. Troponin is also elevated 0.017 normal BUN and creatinine and no previous troponins available for comparison. Ammonia is normal. UA is pending at this time.   Given the elevated BNP, chest x-ray finding and history of CHF, did elect not to give patient a full 30 mL per kilogram fluid bolus as there is increased risk for vascular

## 2018-09-22 NOTE — ED NOTES
Patient exited ER at this time with [id number] staff.       Richard Barney RN  09/22/18 1089

## 2018-09-22 NOTE — ED PROVIDER NOTES
I independently examined and evaluated Ama Muñoz. In brief, presents with increased fatigue and unwitnessed fall today. Denies any pain. Denies any chest pain or shortness of breath. No nausea, vomiting, fevers or chills. On anticoagulation. Vitals:    09/22/18 1150   BP: (!) 136/94   Pulse: 90   Resp: 20   SpO2: 94%     Focused exam revealed A appearing female who appears significantly fatigued. Heart regular rate but irregularly irregular rhythm. Lungs clear to auscultation bilaterally. Abdomen nontender. Wakes up to voice and answers questions, but quickly goes back to sleep. She appears lethargic. Unknown what her baseline is. EKG:  Atrial fibrillation with a rate of 92. QRS 98, QTC of 319. No ST elevations or depressions. Nonspecific T waves. Initially right bundle branch block. Impression: Abnormal EKG. When compared to previous EKG from 9/7/18, the previously noted tachycardia is resolved, otherwise no significant changes. ED course:  Basic labs are obtained and show leukocytosis. Urine is not concerning for an infection. Blood cultures were obtained. Lactic acid is slightly elevated. CXR concerning for pulmonary venous congestion but is otherwise unremarkable. She is maintaining normal oxygen saturations in the emergency department. CT scan of her head is concerning for a 4 mm hyperdensity along the right. Stat at all posterior parietal cortex which could represent a small subarachnoid hemorrhage or cortical petechial hemorrhage. I'm concerned this finding may be contributing to patient's lethargy. No focal neurologic deficits. Will transfer her to [facility] for neurosurgical evaluation. Upon my evaluation, this patient had a high probability of imminent or life-threatening deterioration due to altered mental status and concern for intracranial bleeding, which required my direct attention, intervention, and personal management.     Critical time is performed by

## 2018-09-22 NOTE — ED NOTES
Bed: ED-15  Expected date:   Expected time:   Means of arrival:   Comments:  Medic 1 89F BITA, fall     Anay Doran, TRACI  09/22/18 [id number]

## 2018-09-23 LAB
CULTURE: NORMAL
Lab: NORMAL
REPORT STATUS: NORMAL
SPECIMEN: NORMAL

## 2018-09-23 PROCEDURE — 93010 ELECTROCARDIOGRAM REPORT: CPT | Performed by: INTERNAL MEDICINE

## 2018-09-26 LAB
EKG ATRIAL RATE: 97 BPM
EKG DIAGNOSIS: NORMAL
EKG Q-T INTERVAL: 258 MS
EKG QRS DURATION: 98 MS
EKG QTC CALCULATION (BAZETT): 319 MS
EKG R AXIS: -4 DEGREES
EKG T AXIS: -47 DEGREES
EKG VENTRICULAR RATE: 92 BPM

## 2018-09-27 LAB
CULTURE: NORMAL
CULTURE: NORMAL
Lab: NORMAL
Lab: NORMAL
REPORT STATUS: NORMAL
REPORT STATUS: NORMAL
SPECIMEN: NORMAL
SPECIMEN: NORMAL

## 2018-09-28 ENCOUNTER — OFFICE VISIT (OUTPATIENT)
Dept: CARDIOLOGY CLINIC | Age: 83
End: 2018-09-28
Payer: COMMERCIAL

## 2018-09-28 VITALS
WEIGHT: 113 LBS | HEIGHT: 60 IN | BODY MASS INDEX: 22.19 KG/M2 | DIASTOLIC BLOOD PRESSURE: 60 MMHG | SYSTOLIC BLOOD PRESSURE: 118 MMHG | HEART RATE: 74 BPM

## 2018-09-28 DIAGNOSIS — I48.91 ATRIAL FIBRILLATION AND FLUTTER (HCC): ICD-10-CM
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
## 2018-09-28 DIAGNOSIS — I50.30 DIASTOLIC CONGESTIVE HEART FAILURE, UNSPECIFIED HF CHRONICITY (HCC): ICD-10-CM

## 2018-09-28 DIAGNOSIS — I48.92 ATRIAL FIBRILLATION AND FLUTTER (HCC): ICD-10-CM

## 2018-09-28 DIAGNOSIS — I95.1 ORTHOSTATIC HYPOTENSION: ICD-10-CM

## 2018-09-28 PROCEDURE — 99215 OFFICE O/P EST HI 40 MIN: CPT | Performed by: INTERNAL MEDICINE

## 2018-09-28 RX ORDER — FUROSEMIDE 20 MG/1
60 TABLET ORAL DAILY
Qty: 60 TABLET | Status: SHIPPED | COMMUNITY
Start: 2018-09-28 | End: 2018-10-24 | Stop reason: SDUPTHER

## 2018-09-28 RX ORDER — MIDODRINE HYDROCHLORIDE 5 MG/1
5 TABLET ORAL 3 TIMES DAILY
Qty: 90 TABLET | Refills: 3 | Status: SHIPPED | OUTPATIENT
Start: 2018-09-28 | End: 2018-10-01 | Stop reason: DRUGHIGH

## 2018-10-01 ENCOUNTER — TELEPHONE (OUTPATIENT)
Dept: CARDIOLOGY CLINIC | Age: 83
End: 2018-10-01

## 2018-10-01 ENCOUNTER — OFFICE VISIT (OUTPATIENT)
Dept: FAMILY MEDICINE CLINIC | Age: 83
End: 2018-10-01
Payer: COMMERCIAL

## 2018-10-01 ENCOUNTER — TELEPHONE (OUTPATIENT)
Dept: FAMILY MEDICINE CLINIC | Age: 83
End: 2018-10-01

## 2018-10-01 VITALS
WEIGHT: 107 LBS | TEMPERATURE: 98 F | BODY MASS INDEX: 21.01 KG/M2 | SYSTOLIC BLOOD PRESSURE: 110 MMHG | HEIGHT: 60 IN | DIASTOLIC BLOOD PRESSURE: 70 MMHG | HEART RATE: 80 BPM | OXYGEN SATURATION: 99 %

## 2018-10-01 DIAGNOSIS — F80.9 SPEECH DELAY: ICD-10-CM

## 2018-10-01 DIAGNOSIS — R54 SENILE DEBILITY: ICD-10-CM

## 2018-10-01 DIAGNOSIS — I50.30 DIASTOLIC CONGESTIVE HEART FAILURE, UNSPECIFIED HF CHRONICITY (HCC): ICD-10-CM

## 2018-10-01 DIAGNOSIS — Z91.81 RISK FOR FALLS: ICD-10-CM

## 2018-10-01 DIAGNOSIS — Z09 HOSPITAL DISCHARGE FOLLOW-UP: Primary | ICD-10-CM

## 2018-10-01 DIAGNOSIS — M48.061 SPINAL STENOSIS OF LUMBAR REGION, UNSPECIFIED WHETHER NEUROGENIC CLAUDICATION PRESENT: ICD-10-CM

## 2018-10-01 DIAGNOSIS — I48.92 ATRIAL FIBRILLATION AND FLUTTER (HCC): ICD-10-CM

## 2018-10-01 DIAGNOSIS — I48.91 ATRIAL FIBRILLATION AND FLUTTER (HCC): ICD-10-CM

## 2018-10-01 PROCEDURE — 99495 TRANSJ CARE MGMT MOD F2F 14D: CPT | Performed by: FAMILY MEDICINE

## 2018-10-01 PROCEDURE — 1111F DSCHRG MED/CURRENT MED MERGE: CPT | Performed by: FAMILY MEDICINE

## 2018-10-01 RX ORDER — ECHINACEA PURPUREA EXTRACT 125 MG
1 TABLET ORAL PRN
COMMUNITY
End: 2018-10-01

## 2018-10-01 RX ORDER — MIDODRINE HYDROCHLORIDE 5 MG/1
5 TABLET ORAL DAILY
COMMUNITY
End: 2018-10-24 | Stop reason: SDUPTHER

## 2018-10-02 ENCOUNTER — TELEPHONE (OUTPATIENT)
Dept: CARDIOLOGY CLINIC | Age: 83
End: 2018-10-02

## 2018-10-02 NOTE — PROGRESS NOTES
medications ordered at time of hospital discharge: Yes    Chief Complaint   Patient presents with    Fall     discharged 9/27/2018 intracraniel hemmorrhage       History of Present illness - Follow up of Hospital diagnosis(es): Patient lives in assisted living. She came out of the bathroom and bent over to  a pair shoes that were not hers. She lost her balance and hit her head. Was taken to [facility] and then transferred to [place].  She was seen by the trauma service failure and was seen by neurosurgery. Patient had intracranial bleed. She was observed for a period of time and then released. It was suggested that she have home physical therapy, occupational therapy, and speech therapy. She is return to the assisted living home. Her friend is in agreement that patient's speech is slower than before she went into the hospital.    Inpatient course: Discharge summary reviewed- see chart. See scanned discharge summary    Interval history/Current status: Patient is back at home at the assisted living home. Friend thinks she needs more home care. Patient is not willing to go to nursing home yet. Vitals:    10/01/18 1453   BP: 110/70   Pulse: 80   Temp: 98 °F (36.7 °C)   SpO2: 99%   Weight: 107 lb (48.5 kg)   Height: 5' (1.524 m)     Body mass index is 20.9 kg/m². Wt Readings from Last 3 Encounters:   10/01/18 107 lb (48.5 kg)   09/28/18 113 lb (51.3 kg)   09/22/18 124 lb (56.2 kg)     BP Readings from Last 3 Encounters:   10/01/18 110/70   09/28/18 118/60   09/22/18 125/75       A comprehensive review of systems was negative except for what was noted in the HPI. Physical Exam:  General Appearance: alert and oriented to person. She believes she is in a hospital.  The years 2020.   She does not know the month.  well developed and well- nourished, in no acute distress  Skin: warm and dry, no rash or erythema  Head: normocephalic and atraumatic  Eyes: pupils

## 2018-10-03 RX ORDER — FUROSEMIDE 20 MG/1
60 TABLET ORAL DAILY
Qty: 90 TABLET | Refills: 5 | OUTPATIENT
Start: 2018-10-03

## 2018-10-08 ENCOUNTER — TELEPHONE (OUTPATIENT)
Dept: FAMILY MEDICINE CLINIC | Age: 83
End: 2018-10-08

## 2018-10-18 ENCOUNTER — TELEPHONE (OUTPATIENT)
Dept: FAMILY MEDICINE CLINIC | Age: 83
End: 2018-10-18

## 2018-10-24 ENCOUNTER — TELEPHONE (OUTPATIENT)
Dept: CARDIOLOGY CLINIC | Age: 83
End: 2018-10-24

## 2018-10-24 ENCOUNTER — OFFICE VISIT (OUTPATIENT)
Dept: CARDIOLOGY CLINIC | Age: 83
End: 2018-10-24
Payer: COMMERCIAL

## 2018-10-24 ENCOUNTER — PROCEDURE VISIT (OUTPATIENT)
Dept: CARDIOLOGY CLINIC | Age: 83
End: 2018-10-24
Payer: COMMERCIAL

## 2018-10-24 VITALS
DIASTOLIC BLOOD PRESSURE: 68 MMHG | WEIGHT: 103.8 LBS | HEIGHT: 59 IN | BODY MASS INDEX: 20.92 KG/M2 | SYSTOLIC BLOOD PRESSURE: 132 MMHG | HEART RATE: 86 BPM

## 2018-10-24 VITALS — HEART RATE: 86 BPM | SYSTOLIC BLOOD PRESSURE: 132 MMHG | DIASTOLIC BLOOD PRESSURE: 68 MMHG

## 2018-10-24 DIAGNOSIS — Z95.0 CARDIAC PACEMAKER IN SITU: Primary | ICD-10-CM

## 2018-10-24 DIAGNOSIS — I48.92 ATRIAL FIBRILLATION AND FLUTTER (HCC): Primary | ICD-10-CM

## 2018-10-24 DIAGNOSIS — I48.91 ATRIAL FIBRILLATION AND FLUTTER (HCC): Primary | ICD-10-CM

## 2018-10-24 PROCEDURE — 93280 PM DEVICE PROGR EVAL DUAL: CPT | Performed by: INTERNAL MEDICINE

## 2018-10-24 PROCEDURE — 99214 OFFICE O/P EST MOD 30 MIN: CPT | Performed by: INTERNAL MEDICINE

## 2018-10-24 RX ORDER — MIDODRINE HYDROCHLORIDE 5 MG/1
5 TABLET ORAL DAILY
Qty: 90 TABLET | Refills: 3 | Status: CANCELLED | OUTPATIENT
Start: 2018-10-24

## 2018-10-24 RX ORDER — FUROSEMIDE 20 MG/1
40 TABLET ORAL DAILY
Qty: 60 TABLET | Refills: 3 | Status: ON HOLD | OUTPATIENT
Start: 2018-10-24 | End: 2019-03-29 | Stop reason: HOSPADM

## 2018-10-24 RX ORDER — FUROSEMIDE 20 MG/1
40 TABLET ORAL DAILY
Qty: 60 TABLET | Refills: 3
Start: 2018-10-24 | End: 2018-10-24 | Stop reason: SDUPTHER

## 2018-10-24 RX ORDER — DIGOXIN 125 MCG
125 TABLET ORAL DAILY
Qty: 30 TABLET | Refills: 3 | Status: CANCELLED | OUTPATIENT
Start: 2018-10-24

## 2018-10-24 RX ORDER — MIDODRINE HYDROCHLORIDE 5 MG/1
5 TABLET ORAL DAILY
Qty: 90 TABLET | Refills: 2 | Status: SHIPPED | OUTPATIENT
Start: 2018-10-24 | End: 2018-11-21

## 2018-10-24 RX ORDER — FUROSEMIDE 20 MG/1
40 TABLET ORAL DAILY
Qty: 60 TABLET | Refills: 3 | Status: CANCELLED | OUTPATIENT
Start: 2018-10-24

## 2018-10-24 RX ORDER — DIGOXIN 125 MCG
125 TABLET ORAL DAILY
Qty: 30 TABLET | Refills: 3 | Status: SHIPPED | OUTPATIENT
Start: 2018-10-24 | End: 2019-09-27

## 2018-10-24 NOTE — PROGRESS NOTES
Jose C Gallegos MD        OFFICE  FOLLOWUP NOTE    Chief complaints: patient is here for management of  CHF, PPM,AFIB, DIZZINESS, EPISTAXIS, possible subarachnoid hemorrhage or cortical petechial hemorrhage  Subjective: patient feels better, no chest pain, no shortness of breath, no dizziness, no palpitations    HPI Rosmery Moran is a 80 y. o.year old who  has a past medical history of Atrial fibrillation and flutter ([address] CHF (congestive heart failure) ([facility] [address] Diverticulosis; Enlarged thyroid; GERD (gastroesophageal reflux disease); H/O echocardiogram; History of CT scan of head; History of echocardiogram; IBS (irritable bowel syndrome); Mitral regurgitation; Osteoarthritis; Osteoporosis; and Pedal edema. and presents for management of CHF, PPM,AFIB, DIZZINESS, EPISTAXIS, possible subarachnoid hemorrhage or cortical petechial hemorrhage which are well controlled      Current Outpatient Prescriptions   Medication Sig Dispense Refill    Multiple Vitamin (MULTI-VITAMIN DAILY PO) Take 1 tablet by mouth daily      midodrine (PROAMATINE) 5 MG tablet Take 5 mg by mouth Daily     [address] by Does not apply route 1 each 0    furosemide (LASIX) 20 MG tablet Take 3 tablets by mouth daily 60 tablet     digoxin (LANOXIN) 125 MCG tablet Take 1 tablet by mouth daily 30 tablet 3    calcium carbonate (OSCAL) 500 MG TABS tablet Take 500 mg by mouth 2 times daily        No current facility-administered medications for this visit. Allergies: Patient has no known allergies. Past Medical History:   Diagnosis Date    Atrial fibrillation and flutter (HCC)     CHF (congestive heart failure) (HCC)     Diverticulosis     Enlarged thyroid     GERD (gastroesophageal reflux disease)     H/O echocardiogram 05/08/2017    EF 60%     History of CT scan of head 07/09/2018    No acute intracranial abnormality. Diffuse atrophic changes with findings suggesting chronic microvascular ischemia.     History of

## 2018-10-30 ENCOUNTER — TELEPHONE (OUTPATIENT)
Dept: FAMILY MEDICINE CLINIC | Age: 83
End: 2018-10-30

## 2018-11-06 ENCOUNTER — OFFICE VISIT (OUTPATIENT)
Dept: FAMILY MEDICINE CLINIC | Age: 83
End: 2018-11-06
Payer: COMMERCIAL

## 2018-11-06 VITALS
BODY MASS INDEX: 19.96 KG/M2 | WEIGHT: 98.8 LBS | HEART RATE: 67 BPM | SYSTOLIC BLOOD PRESSURE: 110 MMHG | OXYGEN SATURATION: 99 % | DIASTOLIC BLOOD PRESSURE: 60 MMHG | TEMPERATURE: 98.4 F

## 2018-11-06 DIAGNOSIS — R63.4 WEIGHT LOSS: Primary | ICD-10-CM

## 2018-11-06 DIAGNOSIS — R54 SENILE DEBILITY: ICD-10-CM

## 2018-11-06 DIAGNOSIS — R41.3 MEMORY LOSS: ICD-10-CM

## 2018-11-06 LAB
HCT VFR BLD CALC: 42.4 % (ref 36–48)
HEMOGLOBIN: 14.2 G/DL (ref 12–16)
MCH RBC QN AUTO: 31.1 PG (ref 26–34)
MCHC RBC AUTO-ENTMCNC: 33.6 G/DL (ref 31–36)
MCV RBC AUTO: 92.6 FL (ref 80–100)
PDW BLD-RTO: 14.1 % (ref 12.4–15.4)
PLATELET # BLD: 194 K/UL (ref 135–450)
PMV BLD AUTO: 9.3 FL (ref 5–10.5)
RBC # BLD: 4.58 M/UL (ref 4–5.2)
WBC # BLD: 9.5 K/UL (ref 4–11)

## 2018-11-06 PROCEDURE — 99214 OFFICE O/P EST MOD 30 MIN: CPT | Performed by: FAMILY MEDICINE

## 2018-11-06 PROCEDURE — 36415 COLL VENOUS BLD VENIPUNCTURE: CPT | Performed by: FAMILY MEDICINE

## 2018-11-06 RX ORDER — FEEDER CONTAINER WITH PUMP SET
EACH MISCELLANEOUS
Qty: 90 CAN | Refills: 11 | Status: SHIPPED | OUTPATIENT
Start: 2018-11-06 | End: 2019-04-08

## 2018-11-06 ASSESSMENT — PATIENT HEALTH QUESTIONNAIRE - PHQ9
SUM OF ALL RESPONSES TO PHQ QUESTIONS 1-9: 0
SUM OF ALL RESPONSES TO PHQ QUESTIONS 1-9: 0
2. FEELING DOWN, DEPRESSED OR HOPELESS: 0
DEPRESSION UNABLE TO ASSESS: FUNCTIONAL CAPACITY MOTIVATION LIMITS ACCURACY
SUM OF ALL RESPONSES TO PHQ9 QUESTIONS 1 & 2: 0
1. LITTLE INTEREST OR PLEASURE IN DOING THINGS: 0

## 2018-11-07 LAB
A/G RATIO: 1.2 (ref 1.1–2.2)
ALBUMIN SERPL-MCNC: 4.1 G/DL (ref 3.4–5)
ALP BLD-CCNC: 68 U/L (ref 40–129)
ALT SERPL-CCNC: 11 U/L (ref 10–40)
ANION GAP SERPL CALCULATED.3IONS-SCNC: 14 MMOL/L (ref 3–16)
AST SERPL-CCNC: 22 U/L (ref 15–37)
BILIRUB SERPL-MCNC: 0.8 MG/DL (ref 0–1)
BUN BLDV-MCNC: 16 MG/DL (ref 7–20)
CALCIUM SERPL-MCNC: 9.5 MG/DL (ref 8.3–10.6)
CHLORIDE BLD-SCNC: 97 MMOL/L (ref 99–110)
CO2: 27 MMOL/L (ref 21–32)
CREAT SERPL-MCNC: 0.8 MG/DL (ref 0.6–1.2)
GFR AFRICAN AMERICAN: >60
GFR NON-AFRICAN AMERICAN: >60
GLOBULIN: 3.3 G/DL
GLUCOSE BLD-MCNC: 91 MG/DL (ref 70–99)
POTASSIUM SERPL-SCNC: 4.5 MMOL/L (ref 3.5–5.1)
SODIUM BLD-SCNC: 138 MMOL/L (ref 136–145)
TOTAL PROTEIN: 7.4 G/DL (ref 6.4–8.2)
TSH SERPL DL<=0.05 MIU/L-ACNC: 0.06 UIU/ML (ref 0.27–4.2)

## 2018-11-07 ASSESSMENT — ENCOUNTER SYMPTOMS: SHORTNESS OF BREATH: 1

## 2018-11-09 ENCOUNTER — OFFICE VISIT (OUTPATIENT)
Dept: FAMILY MEDICINE CLINIC | Age: 83
End: 2018-11-09
Payer: COMMERCIAL

## 2018-11-09 VITALS
HEIGHT: 59 IN | DIASTOLIC BLOOD PRESSURE: 80 MMHG | BODY MASS INDEX: 19.8 KG/M2 | WEIGHT: 98.2 LBS | HEART RATE: 75 BPM | SYSTOLIC BLOOD PRESSURE: 122 MMHG

## 2018-11-09 DIAGNOSIS — E05.90 HYPERTHYROIDISM: ICD-10-CM

## 2018-11-09 DIAGNOSIS — E05.90 HYPERTHYROIDISM: Primary | ICD-10-CM

## 2018-11-09 DIAGNOSIS — R54 SENILE DEBILITY: ICD-10-CM

## 2018-11-09 LAB
T4 FREE: 1.6 NG/DL (ref 0.9–1.8)
URINE CULTURE, ROUTINE: NORMAL

## 2018-11-09 PROCEDURE — 99213 OFFICE O/P EST LOW 20 MIN: CPT | Performed by: FAMILY MEDICINE

## 2018-11-09 RX ORDER — METHIMAZOLE 5 MG/1
5 TABLET ORAL 2 TIMES DAILY
Qty: 60 TABLET | Refills: 1 | Status: SHIPPED | OUTPATIENT
Start: 2018-11-09 | End: 2019-01-08 | Stop reason: SDUPTHER

## 2018-11-09 NOTE — LETTER
[facility]  [address]  Phone: [phone]  Fax: [phone]    Salvador Harris MD        November 9, 2018    Nawaf Braga  [address] [place] [address]      Dear Ryan Steinberg:    You are on Tapazole 5mg twice per day. If you have any questions or concerns, please don't hesitate to call.     Sincerely,        Salvador Harris MD

## 2018-11-10 NOTE — PROGRESS NOTES
years old.  VARICOSE VEIN SURGERY      stripping bilat legs       Family History   Problem Relation Age of Onset    High Blood Pressure Sister     Other Sister         sinus problems, vericose veins    Other Father         GI probloems - hx obstruction    Heart Disease Sister         CHF    Diabetes Sister         s/p partial pancreas removal    Early Death Daughter 40        unknown cause    Asthma Son     Cancer Brother         leukemia    Heart Disease Brother         CABG    Early Death Brother         Murdered    Other Brother         dies at 80. unknown cause.  Cancer Brother         throat    Early Death Brother 5        pneumonia       Current Outpatient Prescriptions on File Prior to Visit   Medication Sig Dispense Refill    furosemide (LASIX) 20 MG tablet Take 2 tablets by mouth daily 60 tablet 3    midodrine (PROAMATINE) 5 MG tablet Take 1 tablet by mouth Daily 90 tablet 2    digoxin (LANOXIN) 125 MCG tablet Take 1 tablet by mouth daily 30 tablet 3    Multiple Vitamin (MULTI-VITAMIN DAILY PO) Take 1 tablet by mouth daily     [address] by Does not apply route 1 each 0    calcium carbonate (OSCAL) 500 MG TABS tablet Take 500 mg by mouth 2 times daily       Nutritional Supplements (ENSURE HIGH PROTEIN) LIQD 3 times per day (patient needs lactose free) 90 Can 11     No current facility-administered medications on file prior to visit. Objective:   Physical Exam   Constitutional: She appears well-developed and well-nourished. Sitting in wheelchair, sleeping at times. HENT:   Head: Normocephalic and atraumatic. Neck: Neck supple. Cardiovascular: Normal rate, regular rhythm, S1 normal, S2 normal and normal heart sounds. Pulmonary/Chest: Effort normal and breath sounds normal. No respiratory distress. She has no wheezes. Skin: Skin is warm, dry and intact. Psychiatric: Cognition and memory are impaired.    Nursing note and vitals reviewed. Vitals:    11/09/18 1029   BP: 122/80   Site: Right Upper Arm   Position: Sitting   Cuff Size: Child   Pulse: 75   Weight: 98 lb 3.2 oz (44.5 kg)   Height: 4' 11\" (1.499 m)     Body mass index is 19.83 kg/m². Wt Readings from Last 3 Encounters:   11/09/18 98 lb 3.2 oz (44.5 kg)   11/06/18 98 lb 12.8 oz (44.8 kg)   10/24/18 103 lb 12.8 oz (47.1 kg)     BP Readings from Last 3 Encounters:   11/09/18 122/80   11/06/18 110/60   10/24/18 132/68          No results found for this visit on 11/09/18. Assessment:       Diagnosis Orders   1. Hyperthyroidism  US THYROID    methimazole (TAPAZOLE) 5 MG tablet    T4, FREE    THYROID STIMULATING IMMUNOGLOBULIN   2. Senile debility             Plan:      See orders    Hyperthyroid could cause some of her mental status symptoms.   Re-check after [place]

## 2018-11-13 ENCOUNTER — APPOINTMENT (OUTPATIENT)
Dept: CT IMAGING | Age: 83
End: 2018-11-13
Payer: COMMERCIAL

## 2018-11-13 ENCOUNTER — HOSPITAL ENCOUNTER (EMERGENCY)
Age: 83
Discharge: HOME | End: 2018-11-13
Attending: EMERGENCY MEDICINE
Payer: COMMERCIAL

## 2018-11-13 VITALS
OXYGEN SATURATION: 99 % | WEIGHT: 96 LBS | HEART RATE: 67 BPM | SYSTOLIC BLOOD PRESSURE: 126 MMHG | BODY MASS INDEX: 18.85 KG/M2 | TEMPERATURE: 98.2 F | DIASTOLIC BLOOD PRESSURE: 75 MMHG | RESPIRATION RATE: 16 BRPM | HEIGHT: 60 IN

## 2018-11-13 DIAGNOSIS — S00.01XA ABRASION OF SCALP, INITIAL ENCOUNTER: ICD-10-CM

## 2018-11-13 DIAGNOSIS — S09.90XA MINOR HEAD INJURY, INITIAL ENCOUNTER: Primary | ICD-10-CM
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
## 2018-11-13 PROCEDURE — 99283 EMERGENCY DEPT VISIT LOW MDM: CPT

## 2018-11-13 PROCEDURE — 72125 CT NECK SPINE W/O DYE: CPT

## 2018-11-13 PROCEDURE — 70450 CT HEAD/BRAIN W/O DYE: CPT

## 2018-11-13 NOTE — ED PROVIDER NOTES
understands and agrees. Return to emergency Department precautions, which included any change in nature or severity of symptoms, development of numbness/tingling, or weakness, or any new symptoms, were discussed in detail with patient who understands and agrees.       (Please note the MDM and HPI sections of this note were completed with a voice recognition program.  Efforts were made to edit the dictations but occasionally words are mis-transcribed.)        Narciso Salazar DO  11/13/18 1811

## 2018-11-14 ENCOUNTER — HOSPITAL ENCOUNTER (OUTPATIENT)
Dept: ULTRASOUND IMAGING | Age: 83
Discharge: HOME | End: 2018-11-14
Payer: COMMERCIAL

## 2018-11-14 DIAGNOSIS — E05.90 HYPERTHYROIDISM: ICD-10-CM

## 2018-11-14 PROCEDURE — 76536 US EXAM OF HEAD AND NECK: CPT

## 2018-11-15 DIAGNOSIS — E05.90 HYPERTHYROIDISM: ICD-10-CM

## 2018-11-15 DIAGNOSIS — E04.1 THYROID NODULE: Primary | ICD-10-CM

## 2018-11-20 ENCOUNTER — TELEPHONE (OUTPATIENT)
Dept: FAMILY MEDICINE CLINIC | Age: 83
End: 2018-11-20

## 2018-11-20 LAB — THYROID STIMULATING IMMUNOGLOBULIN: 90 %

## 2018-11-21 ENCOUNTER — NURSE ONLY (OUTPATIENT)
Dept: CARDIOLOGY CLINIC | Age: 83
End: 2018-11-21
Payer: COMMERCIAL

## 2018-11-21 ENCOUNTER — OFFICE VISIT (OUTPATIENT)
Dept: CARDIOLOGY CLINIC | Age: 83
End: 2018-11-21
Payer: COMMERCIAL

## 2018-11-21 VITALS
WEIGHT: 106 LBS | BODY MASS INDEX: 20.81 KG/M2 | DIASTOLIC BLOOD PRESSURE: 58 MMHG | HEIGHT: 60 IN | HEART RATE: 92 BPM | SYSTOLIC BLOOD PRESSURE: 118 MMHG

## 2018-11-21 DIAGNOSIS — R00.2 PALPITATIONS: Primary | ICD-10-CM

## 2018-11-21 PROCEDURE — 99214 OFFICE O/P EST MOD 30 MIN: CPT | Performed by: INTERNAL MEDICINE

## 2018-11-21 NOTE — PROGRESS NOTES
Grady Harmon MD        OFFICE  FOLLOWUP NOTE    Chief complaints: patient is here for management of CHF, PPM,AFIB, DIZZINESS, EPISTAXIS, possible subarachnoid hemorrhage or cortical petechial hemorrhage    Subjective: + palpitations+ dysphagia    HPI Darinel Thompson is a 80 y. o.year old who  has a past medical history of Atrial fibrillation and flutter ([facility] [address] CHF (congestive heart failure) ([facility] [address] Diverticulosis; Enlarged thyroid; GERD (gastroesophageal reflux disease); H/O echocardiogram; History of CT scan of head; History of echocardiogram; IBS (irritable bowel syndrome); Mitral regurgitation; Osteoarthritis; Osteoporosis; and Pedal edema. and presents for management of CHF, PPM,AFIB, DIZZINESS, EPISTAXIS, possible subarachnoid hemorrhage or cortical petechial hemorrhage which are well controlled    Patient has dysphagia and has hyperactive thyroid  Current Outpatient Prescriptions   Medication Sig Dispense Refill    methimazole (TAPAZOLE) 5 MG tablet Take 1 tablet by mouth 2 times daily for 60 doses 60 tablet 1    Nutritional Supplements (ENSURE HIGH PROTEIN) LIQD 3 times per day (patient needs lactose free) 90 Can 11    furosemide (LASIX) 20 MG tablet Take 2 tablets by mouth daily 60 tablet 3    midodrine (PROAMATINE) 5 MG tablet Take 1 tablet by mouth Daily 90 tablet 2    digoxin (LANOXIN) 125 MCG tablet Take 1 tablet by mouth daily 30 tablet 3    Multiple Vitamin (MULTI-VITAMIN DAILY PO) Take 1 tablet by mouth daily     [address] by Does not apply route 1 each 0    calcium carbonate (OSCAL) 500 MG TABS tablet Take 500 mg by mouth 2 times daily        No current facility-administered medications for this visit. Allergies: Patient has no known allergies.   Past Medical History:   Diagnosis Date    Atrial fibrillation and flutter (HCC)     CHF (congestive heart failure) (HCC)     Diverticulosis     Enlarged thyroid     GERD (gastroesophageal reflux disease)     H/O

## 2018-11-26 ENCOUNTER — OFFICE VISIT (OUTPATIENT)
Dept: FAMILY MEDICINE CLINIC | Age: 83
End: 2018-11-26
Payer: COMMERCIAL

## 2018-11-26 ENCOUNTER — TELEPHONE (OUTPATIENT)
Dept: CARDIOLOGY CLINIC | Age: 83
End: 2018-11-26

## 2018-11-26 VITALS
DIASTOLIC BLOOD PRESSURE: 70 MMHG | SYSTOLIC BLOOD PRESSURE: 120 MMHG | HEIGHT: 60 IN | BODY MASS INDEX: 20.7 KG/M2 | HEART RATE: 92 BPM

## 2018-11-26 DIAGNOSIS — R54 SENILE DEBILITY: ICD-10-CM

## 2018-11-26 DIAGNOSIS — M79.601 RIGHT ARM PAIN: Primary | ICD-10-CM

## 2018-11-26 DIAGNOSIS — Z91.81 RISK FOR FALLS: ICD-10-CM

## 2018-11-26 PROCEDURE — 99213 OFFICE O/P EST LOW 20 MIN: CPT | Performed by: FAMILY MEDICINE

## 2018-11-26 ASSESSMENT — ENCOUNTER SYMPTOMS: SHORTNESS OF BREATH: 0

## 2018-11-28 ENCOUNTER — CARE COORDINATION (OUTPATIENT)
Dept: CARE COORDINATION | Age: 83
End: 2018-11-28

## 2018-11-28 NOTE — LETTER
11/28/2018    Uzma Basestt Maffucci  [address] [place] [address]      Dear Greg Fuentes,    My name is Adina Kirby and I am a registered nurse who partners with Rasheed Mukherjee MD to improve patients' health. Rasheed Mukherjee MD believes you would benefit from working with me. As a member of your health care team, I would work with other providers involved in your care, offer education for your specific health conditions, and connect you with additional resources as needed. I will collaborate with Rasheed Mukherjee MD to support you in following your treatment plan. The additional support I provide is no additional cost to you. My primary focus is to help you achieve specific goals and improve your health. We are committed to walk with you on this journey and look forward to working with you. Please call me to further discuss your healthcare needs. I am available by phone or for appointments at the office. You can reach me at [phone]. In good health,           Adina Kirby, [address] [place] [address]  [phone] [email or web address]

## 2018-11-29 ENCOUNTER — TELEPHONE (OUTPATIENT)
Dept: FAMILY MEDICINE CLINIC | Age: 83
End: 2018-11-29

## 2018-11-30 ENCOUNTER — CARE COORDINATION (OUTPATIENT)
Dept: FAMILY MEDICINE CLINIC | Age: 83
End: 2018-11-30

## 2018-11-30 ENCOUNTER — OFFICE VISIT (OUTPATIENT)
Dept: FAMILY MEDICINE CLINIC | Age: 83
End: 2018-11-30
Payer: COMMERCIAL

## 2018-11-30 VITALS
HEART RATE: 76 BPM | TEMPERATURE: 97.6 F | DIASTOLIC BLOOD PRESSURE: 60 MMHG | SYSTOLIC BLOOD PRESSURE: 100 MMHG | BODY MASS INDEX: 20.7 KG/M2 | HEIGHT: 60 IN

## 2018-11-30 DIAGNOSIS — J06.9 UPPER RESPIRATORY TRACT INFECTION, UNSPECIFIED TYPE: Primary | ICD-10-CM

## 2018-11-30 DIAGNOSIS — R29.6 FREQUENT FALLS: ICD-10-CM

## 2018-11-30 DIAGNOSIS — R54 SENILE DEBILITY: ICD-10-CM

## 2018-11-30 PROCEDURE — 99213 OFFICE O/P EST LOW 20 MIN: CPT | Performed by: FAMILY MEDICINE

## 2018-11-30 RX ORDER — DOXYCYCLINE HYCLATE 100 MG
100 TABLET ORAL 2 TIMES DAILY
Qty: 14 TABLET | Refills: 0 | Status: SHIPPED | OUTPATIENT
Start: 2018-11-30 | End: 2018-12-07

## 2018-11-30 NOTE — PROGRESS NOTES
Patient had called and left message that she had questions- attempted to call her back- phone rings and rings- unable to leave amessage

## 2018-11-30 NOTE — PROGRESS NOTES
OFFICE VISIT      Patient ID: Vivian Wilkins 12/15/1921  Chief Complaint   Patient presents with    URI     cough, head congestion, nasal drainage. since yesterday. no ear pain. [address] living patient fell last night. was found on the floor sitting uplast night, check right hand bruising       HPI . HPIper chief complaint. Per caregiver who is here today, patient was found on the floor at her assisted living home. They are assuming that she fell. Patient denies falling. Denies any pain. Review of Systems   Constitutional: Negative for chills, diaphoresis and fever. .  ROS per HPI. ROS is otherwise negative    Patient Active Problem List   Diagnosis    Pedal edema    Atrial fibrillation and flutter (HCC)    CHF (congestive heart failure) (HCC)    Mitral regurgitation    Spinal stenosis of lumbar region    Risk for falls    Thyroid nodule    Senile debility       Past Medical History:   Diagnosis Date    Atrial fibrillation and flutter (HCC)     CHF (congestive heart failure) (HCC)     Diverticulosis     Enlarged thyroid     GERD (gastroesophageal reflux disease)     H/O echocardiogram 05/08/2017    EF 60%     H/O subconjunctival hemorrhage     History of CT scan of head 07/09/2018    No acute intracranial abnormality. Diffuse atrophic changes with findings suggesting chronic microvascular ischemia.     History of echocardiogram 09/26/2016    EF60%-moderate AS, moderate MR, MILD AI, enlarged LA, MODERATE TR    IBS (irritable bowel syndrome)     Mitral regurgitation     moderate severity; Tricuspid Regurgitation: mild severity    Osteoarthritis     primarily affecting the neck, fingers and knees    Osteoporosis     Pacemaker     Palpitations     Pedal edema        Past Surgical History:   Procedure Laterality Date    APPENDECTOMY      with choley    BREAST BIOPSY  1950's    left breast - negative for cancer    CARDIOVASCULAR STRESS TEST  11/2007 Diagnosis Orders   1. Upper respiratory tract infection, unspecified type  doxycycline hyclate (VIBRA-TABS) 100 MG tablet   2. Frequent falls     3. Senile debility             Plan:              Patients with most likely a viral respiratory infection. However due to her age, will give antibiotics. Again urged her to move to  nursing home. I had the care coordinator speak to patient about nursing homes.

## 2018-12-05 ENCOUNTER — HOSPITAL ENCOUNTER (OUTPATIENT)
Dept: INTERVENTIONAL RADIOLOGY/VASCULAR | Age: 83
Discharge: HOME | End: 2018-12-05
Payer: COMMERCIAL

## 2018-12-05 VITALS
SYSTOLIC BLOOD PRESSURE: 131 MMHG | DIASTOLIC BLOOD PRESSURE: 65 MMHG | RESPIRATION RATE: 16 BRPM | HEART RATE: 79 BPM | TEMPERATURE: 96.9 F | OXYGEN SATURATION: 96 %

## 2018-12-05 DIAGNOSIS — E05.90 HYPERTHYROIDISM: ICD-10-CM

## 2018-12-05 DIAGNOSIS — E04.1 THYROID NODULE: ICD-10-CM

## 2018-12-05 LAB
APTT: 32.6 SECONDS (ref 21.2–33)
INR BLD: 1.19 INDEX
PROTHROMBIN TIME: 13.5 SECONDS (ref 9.12–12.5)

## 2018-12-05 PROCEDURE — 10022 HC FNA WITH IMAGING GUIDANCE: CPT | Performed by: RADIOLOGY

## 2018-12-05 PROCEDURE — 88173 CYTOPATH EVAL FNA REPORT: CPT

## 2018-12-05 PROCEDURE — 7100000010 HC PHASE II RECOVERY - FIRST 15 MIN

## 2018-12-05 PROCEDURE — 2709999900 HC NON-CHARGEABLE SUPPLY

## 2018-12-05 PROCEDURE — 88305 TISSUE EXAM BY PATHOLOGIST: CPT

## 2018-12-05 PROCEDURE — 85730 THROMBOPLASTIN TIME PARTIAL: CPT

## 2018-12-05 PROCEDURE — 7100000011 HC PHASE II RECOVERY - ADDTL 15 MIN

## 2018-12-05 PROCEDURE — 85610 PROTHROMBIN TIME: CPT

## 2018-12-05 PROCEDURE — 88172 CYTP DX EVAL FNA 1ST EA SITE: CPT

## 2018-12-05 PROCEDURE — 76942 ECHO GUIDE FOR BIOPSY: CPT | Performed by: RADIOLOGY

## 2018-12-05 ASSESSMENT — PAIN SCALES - GENERAL: PAINLEVEL_OUTOF10: 0

## 2018-12-05 NOTE — H&P
increase in the threat to life or body part)     Mallampati Airway Classification:   []1 [x]2 []3 []4      Physical Exam    Assessment:  Left and right nodules    Plan:  US biopsy    Electronically signed by Marsha Hinson MD on 12/5/2018 at 11:45 AM

## 2018-12-07 ENCOUNTER — TELEPHONE (OUTPATIENT)
Dept: CARDIOLOGY CLINIC | Age: 83
End: 2018-12-07

## 2018-12-07 NOTE — TELEPHONE ENCOUNTER
I called other number listed and Alpesh Jacobson would like to know if Dr. Laverne Murray felt that Shea Paulino is OK from a cardiac standpoint for rehab strength building will discuss this with Dr. Laverne Murray and get back to Alpesh Jacobson. Alpesh Jacobson verbalized understanding of all information given.

## 2018-12-12 ENCOUNTER — OFFICE VISIT (OUTPATIENT)
Dept: FAMILY MEDICINE CLINIC | Age: 83
End: 2018-12-12
Payer: COMMERCIAL

## 2018-12-12 VITALS
HEIGHT: 60 IN | DIASTOLIC BLOOD PRESSURE: 60 MMHG | SYSTOLIC BLOOD PRESSURE: 90 MMHG | HEART RATE: 83 BPM | BODY MASS INDEX: 20.7 KG/M2

## 2018-12-12 DIAGNOSIS — Z91.81 RISK FOR FALLS: ICD-10-CM

## 2018-12-12 DIAGNOSIS — E04.1 THYROID NODULE: ICD-10-CM

## 2018-12-12 DIAGNOSIS — Z02.2 ENCOUNTER FOR EXAMINATION FOR ADMISSION TO NURSING HOME: ICD-10-CM

## 2018-12-12 DIAGNOSIS — E05.90 HYPERTHYROIDISM: Primary | ICD-10-CM

## 2018-12-12 DIAGNOSIS — R54 SENILE DEBILITY: ICD-10-CM

## 2018-12-12 PROCEDURE — 36415 COLL VENOUS BLD VENIPUNCTURE: CPT | Performed by: FAMILY MEDICINE

## 2018-12-12 PROCEDURE — 99214 OFFICE O/P EST MOD 30 MIN: CPT | Performed by: FAMILY MEDICINE

## 2018-12-12 RX ORDER — METHIMAZOLE 5 MG/1
5 TABLET ORAL 2 TIMES DAILY
COMMUNITY
End: 2019-01-09

## 2018-12-12 ASSESSMENT — ENCOUNTER SYMPTOMS
SHORTNESS OF BREATH: 1
GASTROINTESTINAL NEGATIVE: 1

## 2018-12-13 ENCOUNTER — TELEPHONE (OUTPATIENT)
Dept: FAMILY MEDICINE CLINIC | Age: 83
End: 2018-12-13

## 2018-12-13 LAB
T4 TOTAL: 6.6 UG/DL (ref 4.5–10.9)
TSH REFLEX: 0.73 UIU/ML (ref 0.27–4.2)

## 2018-12-18 PROCEDURE — 93228 REMOTE 30 DAY ECG REV/REPORT: CPT | Performed by: INTERNAL MEDICINE

## 2019-01-08 ENCOUNTER — OFFICE VISIT (OUTPATIENT)
Dept: FAMILY MEDICINE CLINIC | Age: 84
End: 2019-01-08
Payer: COMMERCIAL

## 2019-01-08 VITALS
SYSTOLIC BLOOD PRESSURE: 110 MMHG | BODY MASS INDEX: 20.77 KG/M2 | HEART RATE: 88 BPM | HEIGHT: 60 IN | DIASTOLIC BLOOD PRESSURE: 70 MMHG | WEIGHT: 105.8 LBS

## 2019-01-08 DIAGNOSIS — E05.90 HYPERTHYROIDISM: ICD-10-CM

## 2019-01-08 DIAGNOSIS — E04.1 THYROID NODULE: Primary | ICD-10-CM

## 2019-01-08 LAB
T4 FREE: 1.2 NG/DL (ref 0.9–1.8)
TSH REFLEX: 3.96 UIU/ML (ref 0.27–4.2)

## 2019-01-08 PROCEDURE — 99213 OFFICE O/P EST LOW 20 MIN: CPT | Performed by: FAMILY MEDICINE

## 2019-01-08 PROCEDURE — 36415 COLL VENOUS BLD VENIPUNCTURE: CPT | Performed by: FAMILY MEDICINE

## 2019-01-08 RX ORDER — METHIMAZOLE 5 MG/1
5 TABLET ORAL 2 TIMES DAILY
Qty: 60 TABLET | Refills: 2 | Status: SHIPPED | OUTPATIENT
Start: 2019-01-08 | End: 2019-02-07

## 2019-01-21 ENCOUNTER — CARE COORDINATION (OUTPATIENT)
Dept: CARE COORDINATION | Age: 84
End: 2019-01-21

## 2019-01-25 ENCOUNTER — OFFICE VISIT (OUTPATIENT)
Dept: CARDIOLOGY CLINIC | Age: 84
End: 2019-01-25
Payer: COMMERCIAL

## 2019-01-25 VITALS
BODY MASS INDEX: 20.73 KG/M2 | WEIGHT: 109.8 LBS | SYSTOLIC BLOOD PRESSURE: 110 MMHG | HEIGHT: 61 IN | DIASTOLIC BLOOD PRESSURE: 78 MMHG | HEART RATE: 82 BPM

## 2019-01-25 DIAGNOSIS — I48.91 ATRIAL FIBRILLATION AND FLUTTER (HCC): ICD-10-CM

## 2019-01-25 DIAGNOSIS — I50.30 DIASTOLIC CONGESTIVE HEART FAILURE, UNSPECIFIED HF CHRONICITY (HCC): ICD-10-CM

## 2019-01-25 DIAGNOSIS — Z95.0 CARDIAC PACEMAKER IN SITU: ICD-10-CM

## 2019-01-25 DIAGNOSIS — I48.92 ATRIAL FIBRILLATION AND FLUTTER (HCC): ICD-10-CM

## 2019-01-25 DIAGNOSIS — I48.0 PAROXYSMAL ATRIAL FIBRILLATION (HCC): Primary | ICD-10-CM

## 2019-01-25 PROCEDURE — 99214 OFFICE O/P EST MOD 30 MIN: CPT | Performed by: INTERNAL MEDICINE

## 2019-01-25 PROCEDURE — 93280 PM DEVICE PROGR EVAL DUAL: CPT | Performed by: INTERNAL MEDICINE

## 2019-01-28 ENCOUNTER — TELEPHONE (OUTPATIENT)
Dept: CARDIOLOGY CLINIC | Age: 84
End: 2019-01-28

## 2019-02-04 ENCOUNTER — TELEPHONE (OUTPATIENT)
Dept: CARDIOLOGY CLINIC | Age: 84
End: 2019-02-04

## 2019-02-04 ENCOUNTER — CARE COORDINATION (OUTPATIENT)
Dept: CARE COORDINATION | Age: 84
End: 2019-02-04

## 2019-02-12 ENCOUNTER — TELEPHONE (OUTPATIENT)
Dept: FAMILY MEDICINE CLINIC | Age: 84
End: 2019-02-12

## 2019-02-26 ENCOUNTER — OFFICE VISIT (OUTPATIENT)
Dept: FAMILY MEDICINE CLINIC | Age: 84
End: 2019-02-26
Payer: COMMERCIAL

## 2019-02-26 VITALS
SYSTOLIC BLOOD PRESSURE: 110 MMHG | HEART RATE: 76 BPM | BODY MASS INDEX: 20.81 KG/M2 | DIASTOLIC BLOOD PRESSURE: 70 MMHG | HEIGHT: 61 IN | WEIGHT: 110.2 LBS

## 2019-02-26 DIAGNOSIS — E05.90 HYPERTHYROIDISM: Primary | ICD-10-CM

## 2019-02-26 PROCEDURE — 99213 OFFICE O/P EST LOW 20 MIN: CPT | Performed by: FAMILY MEDICINE

## 2019-02-26 RX ORDER — METHIMAZOLE 5 MG/1
5 TABLET ORAL 2 TIMES DAILY
Qty: 180 TABLET | Refills: 1 | Status: ON HOLD | OUTPATIENT
Start: 2019-02-26 | End: 2019-03-29 | Stop reason: SDUPTHER

## 2019-02-26 RX ORDER — METHIMAZOLE 5 MG/1
TABLET ORAL
COMMUNITY
End: 2019-03-28 | Stop reason: SDUPTHER

## 2019-02-28 ENCOUNTER — TELEPHONE (OUTPATIENT)
Dept: FAMILY MEDICINE CLINIC | Age: 84
End: 2019-02-28

## 2019-03-01 ENCOUNTER — TELEPHONE (OUTPATIENT)
Dept: FAMILY MEDICINE CLINIC | Age: 84
End: 2019-03-01

## 2019-03-01 DIAGNOSIS — M48.061 SPINAL STENOSIS OF LUMBAR REGION, UNSPECIFIED WHETHER NEUROGENIC CLAUDICATION PRESENT: Primary | ICD-10-CM

## 2019-03-01 DIAGNOSIS — Z91.81 RISK FOR FALLS: ICD-10-CM

## 2019-03-07 ENCOUNTER — CARE COORDINATION (OUTPATIENT)
Dept: CARE COORDINATION | Age: 84
End: 2019-03-07

## 2019-03-11 ENCOUNTER — HOSPITAL ENCOUNTER (OUTPATIENT)
Dept: GENERAL RADIOLOGY | Age: 84
Discharge: HOME | End: 2019-03-11
Payer: COMMERCIAL

## 2019-03-11 ENCOUNTER — HOSPITAL ENCOUNTER (OUTPATIENT)
Age: 84
Discharge: HOME | End: 2019-03-11
Payer: COMMERCIAL

## 2019-03-11 DIAGNOSIS — Z91.81 RISK FOR FALLS: ICD-10-CM

## 2019-03-11 DIAGNOSIS — M48.061 SPINAL STENOSIS OF LUMBAR REGION, UNSPECIFIED WHETHER NEUROGENIC CLAUDICATION PRESENT: ICD-10-CM

## 2019-03-11 PROCEDURE — 73522 X-RAY EXAM HIPS BI 3-4 VIEWS: CPT

## 2019-03-11 PROCEDURE — 72110 X-RAY EXAM L-2 SPINE 4/>VWS: CPT

## 2019-03-11 PROCEDURE — 73565 X-RAY EXAM OF KNEES: CPT

## 2019-03-13 PROBLEM — M16.0 PRIMARY OSTEOARTHRITIS OF BOTH HIPS: Status: ACTIVE | Noted: 2019-03-13

## 2019-03-13 PROBLEM — M17.11 PRIMARY OSTEOARTHRITIS OF RIGHT KNEE: Status: ACTIVE | Noted: 2019-03-13

## 2019-03-18 ENCOUNTER — OFFICE VISIT (OUTPATIENT)
Dept: FAMILY MEDICINE CLINIC | Age: 84
End: 2019-03-18
Payer: COMMERCIAL

## 2019-03-18 VITALS
RESPIRATION RATE: 20 BRPM | DIASTOLIC BLOOD PRESSURE: 70 MMHG | SYSTOLIC BLOOD PRESSURE: 120 MMHG | HEIGHT: 61 IN | TEMPERATURE: 98.1 F | WEIGHT: 110.2 LBS | HEART RATE: 86 BPM | BODY MASS INDEX: 20.81 KG/M2

## 2019-03-18 DIAGNOSIS — Z91.81 RISK FOR FALLS: ICD-10-CM

## 2019-03-18 DIAGNOSIS — M47.816 SPONDYLOSIS OF LUMBAR REGION WITHOUT MYELOPATHY OR RADICULOPATHY: ICD-10-CM

## 2019-03-18 DIAGNOSIS — M17.11 PRIMARY OSTEOARTHRITIS OF RIGHT KNEE: ICD-10-CM

## 2019-03-18 DIAGNOSIS — M17.12 PRIMARY OSTEOARTHRITIS OF LEFT KNEE: ICD-10-CM

## 2019-03-18 DIAGNOSIS — M16.0 PRIMARY OSTEOARTHRITIS OF BOTH HIPS: Primary | ICD-10-CM

## 2019-03-18 PROCEDURE — 99213 OFFICE O/P EST LOW 20 MIN: CPT | Performed by: FAMILY MEDICINE

## 2019-03-18 PROCEDURE — G8510 SCR DEP NEG, NO PLAN REQD: HCPCS | Performed by: FAMILY MEDICINE

## 2019-03-18 ASSESSMENT — PATIENT HEALTH QUESTIONNAIRE - PHQ9
SUM OF ALL RESPONSES TO PHQ QUESTIONS 1-9: 2
1. LITTLE INTEREST OR PLEASURE IN DOING THINGS: 1
2. FEELING DOWN, DEPRESSED OR HOPELESS: 1
SUM OF ALL RESPONSES TO PHQ QUESTIONS 1-9: 2
SUM OF ALL RESPONSES TO PHQ9 QUESTIONS 1 & 2: 2

## 2019-03-18 ASSESSMENT — ENCOUNTER SYMPTOMS: SHORTNESS OF BREATH: 0

## 2019-03-19 ENCOUNTER — TELEPHONE (OUTPATIENT)
Dept: FAMILY MEDICINE CLINIC | Age: 84
End: 2019-03-19

## 2019-03-28 ENCOUNTER — HOSPITAL ENCOUNTER (OUTPATIENT)
Age: 84
Setting detail: OBSERVATION
Discharge: NURSING, REHAB OR LONG-TERM CARE | End: 2019-03-29
Attending: EMERGENCY MEDICINE | Admitting: FAMILY MEDICINE
Payer: COMMERCIAL

## 2019-03-28 ENCOUNTER — APPOINTMENT (OUTPATIENT)
Dept: CT IMAGING | Age: 84
End: 2019-03-28
Payer: COMMERCIAL

## 2019-03-28 ENCOUNTER — APPOINTMENT (OUTPATIENT)
Dept: GENERAL RADIOLOGY | Age: 84
End: 2019-03-28
Payer: COMMERCIAL

## 2019-03-28 ENCOUNTER — TELEPHONE (OUTPATIENT)
Dept: FAMILY MEDICINE CLINIC | Age: 84
End: 2019-03-28

## 2019-03-28 DIAGNOSIS — R53.83 FATIGUE, UNSPECIFIED TYPE: Primary | ICD-10-CM

## 2019-03-28 DIAGNOSIS — E05.90 HYPERTHYROIDISM: ICD-10-CM

## 2019-03-28 DIAGNOSIS — R77.8 TROPONIN LEVEL ELEVATED: ICD-10-CM

## 2019-03-28 DIAGNOSIS — J90 PLEURAL EFFUSION: ICD-10-CM

## 2019-03-28 DIAGNOSIS — R29.90 STROKE-LIKE EPISODE: ICD-10-CM

## 2019-03-28 DIAGNOSIS — R79.89 ELEVATED BRAIN NATRIURETIC PEPTIDE (BNP) LEVEL: ICD-10-CM

## 2019-03-28 PROBLEM — G45.9 TIA (TRANSIENT ISCHEMIC ATTACK): Status: ACTIVE | Noted: 2019-03-28

## 2019-03-28 LAB
ALBUMIN SERPL-MCNC: 3.9 GM/DL (ref 3.4–5)
ALP BLD-CCNC: 80 IU/L (ref 40–128)
ALT SERPL-CCNC: 12 U/L (ref 10–40)
ANION GAP SERPL CALCULATED.3IONS-SCNC: 11 MMOL/L (ref 4–16)
APTT: 32.1 SECONDS (ref 21.2–33)
AST SERPL-CCNC: 24 IU/L (ref 15–37)
BACTERIA: ABNORMAL /HPF
BASOPHILS ABSOLUTE: 0.1 K/CU MM
BASOPHILS RELATIVE PERCENT: 0.8 % (ref 0–1)
BILIRUB SERPL-MCNC: 0.4 MG/DL (ref 0–1)
BILIRUBIN URINE: NEGATIVE MG/DL
BLOOD, URINE: NEGATIVE
BUN BLDV-MCNC: 18 MG/DL (ref 6–23)
CALCIUM SERPL-MCNC: 9.4 MG/DL (ref 8.3–10.6)
CHLORIDE BLD-SCNC: 99 MMOL/L (ref 99–110)
CHP ED QC CHECK: NORMAL
CLARITY: CLEAR
CO2: 28 MMOL/L (ref 21–32)
COLOR: YELLOW
CREAT SERPL-MCNC: 0.9 MG/DL (ref 0.6–1.1)
DIFFERENTIAL TYPE: ABNORMAL
EOSINOPHILS ABSOLUTE: 0.2 K/CU MM
EOSINOPHILS RELATIVE PERCENT: 1.9 % (ref 0–3)
GFR AFRICAN AMERICAN: >60 ML/MIN/1.73M2
GFR NON-AFRICAN AMERICAN: 58 ML/MIN/1.73M2
GLUCOSE BLD-MCNC: 97 MG/DL
GLUCOSE BLD-MCNC: 97 MG/DL (ref 70–99)
GLUCOSE, URINE: NEGATIVE MG/DL
HCT VFR BLD CALC: 40.5 % (ref 37–47)
HEMOGLOBIN: 12.3 GM/DL (ref 12.5–16)
IMMATURE NEUTROPHIL %: 0.4 % (ref 0–0.43)
INR BLD: 1.11 INDEX
KETONES, URINE: NEGATIVE MG/DL
LEUKOCYTE ESTERASE, URINE: ABNORMAL
LYMPHOCYTES ABSOLUTE: 1.3 K/CU MM
LYMPHOCYTES RELATIVE PERCENT: 11.7 % (ref 24–44)
MAGNESIUM: 2.1 MG/DL (ref 1.8–2.4)
MCH RBC QN AUTO: 28.4 PG (ref 27–31)
MCHC RBC AUTO-ENTMCNC: 30.4 % (ref 32–36)
MCV RBC AUTO: 93.5 FL (ref 78–100)
MONOCYTES ABSOLUTE: 0.8 K/CU MM
MONOCYTES RELATIVE PERCENT: 6.7 % (ref 0–4)
MUCUS: ABNORMAL HPF
NITRITE URINE, QUANTITATIVE: NEGATIVE
NUCLEATED RBC %: 0 %
PDW BLD-RTO: 13.9 % (ref 11.7–14.9)
PH, URINE: 7 (ref 5–8)
PLATELET # BLD: 300 K/CU MM (ref 140–440)
PMV BLD AUTO: 9.9 FL (ref 7.5–11.1)
POTASSIUM SERPL-SCNC: 4.3 MMOL/L (ref 3.5–5.1)
PRO-BNP: 3093 PG/ML
PROTEIN UA: NEGATIVE MG/DL
PROTHROMBIN TIME: 12.9 SECONDS (ref 9.12–12.5)
RBC # BLD: 4.33 M/CU MM (ref 4.2–5.4)
RBC URINE: ABNORMAL /HPF (ref 0–6)
SEGMENTED NEUTROPHILS ABSOLUTE COUNT: 9 K/CU MM
SEGMENTED NEUTROPHILS RELATIVE PERCENT: 78.5 % (ref 36–66)
SODIUM BLD-SCNC: 138 MMOL/L (ref 135–145)
SPECIFIC GRAVITY UA: 1.01 (ref 1–1.03)
SQUAMOUS EPITHELIAL: <1 /HPF
T3 FREE: 2.3 PG/ML (ref 2.3–4.2)
T4 FREE: 1.05 NG/DL (ref 0.9–1.8)
TOTAL CK: 80 IU/L (ref 26–140)
TOTAL IMMATURE NEUTOROPHIL: 0.04 K/CU MM
TOTAL NUCLEATED RBC: 0 K/CU MM
TOTAL PROTEIN: 7.5 GM/DL (ref 6.4–8.2)
TRANSITIONAL EPITHELIAL: <1 /HPF
TRICHOMONAS: ABNORMAL /HPF
TROPONIN T: 0.02 NG/ML
TSH HIGH SENSITIVITY: 7.67 UIU/ML (ref 0.27–4.2)
UROBILINOGEN, URINE: NORMAL MG/DL (ref 0.2–1)
WBC # BLD: 11.4 K/CU MM (ref 4–10.5)
WBC UA: 1 /HPF (ref 0–5)

## 2019-03-28 PROCEDURE — 96361 HYDRATE IV INFUSION ADD-ON: CPT

## 2019-03-28 PROCEDURE — 80053 COMPREHEN METABOLIC PANEL: CPT

## 2019-03-28 PROCEDURE — 83735 ASSAY OF MAGNESIUM: CPT

## 2019-03-28 PROCEDURE — 96360 HYDRATION IV INFUSION INIT: CPT

## 2019-03-28 PROCEDURE — 83880 ASSAY OF NATRIURETIC PEPTIDE: CPT

## 2019-03-28 PROCEDURE — 93010 ELECTROCARDIOGRAM REPORT: CPT | Performed by: INTERNAL MEDICINE

## 2019-03-28 PROCEDURE — 2580000003 HC RX 258: Performed by: EMERGENCY MEDICINE

## 2019-03-28 PROCEDURE — 81001 URINALYSIS AUTO W/SCOPE: CPT

## 2019-03-28 PROCEDURE — 93005 ELECTROCARDIOGRAM TRACING: CPT | Performed by: EMERGENCY MEDICINE

## 2019-03-28 PROCEDURE — 85610 PROTHROMBIN TIME: CPT

## 2019-03-28 PROCEDURE — 85730 THROMBOPLASTIN TIME PARTIAL: CPT

## 2019-03-28 PROCEDURE — 70450 CT HEAD/BRAIN W/O DYE: CPT

## 2019-03-28 PROCEDURE — 6370000000 HC RX 637 (ALT 250 FOR IP): Performed by: NURSE PRACTITIONER

## 2019-03-28 PROCEDURE — G0378 HOSPITAL OBSERVATION PER HR: HCPCS

## 2019-03-28 PROCEDURE — 36415 COLL VENOUS BLD VENIPUNCTURE: CPT

## 2019-03-28 PROCEDURE — 84443 ASSAY THYROID STIM HORMONE: CPT

## 2019-03-28 PROCEDURE — 82550 ASSAY OF CK (CPK): CPT

## 2019-03-28 PROCEDURE — 99285 EMERGENCY DEPT VISIT HI MDM: CPT

## 2019-03-28 PROCEDURE — 71045 X-RAY EXAM CHEST 1 VIEW: CPT

## 2019-03-28 PROCEDURE — 84439 ASSAY OF FREE THYROXINE: CPT

## 2019-03-28 PROCEDURE — 2580000003 HC RX 258: Performed by: NURSE PRACTITIONER

## 2019-03-28 PROCEDURE — 84481 FREE ASSAY (FT-3): CPT

## 2019-03-28 PROCEDURE — 84484 ASSAY OF TROPONIN QUANT: CPT

## 2019-03-28 PROCEDURE — 85025 COMPLETE CBC W/AUTO DIFF WBC: CPT

## 2019-03-28 RX ORDER — 0.9 % SODIUM CHLORIDE 0.9 %
1000 INTRAVENOUS SOLUTION INTRAVENOUS ONCE
Status: COMPLETED | OUTPATIENT
Start: 2019-03-28 | End: 2019-03-28

## 2019-03-28 RX ORDER — ASPIRIN 81 MG/1
81 TABLET ORAL DAILY
Status: DISCONTINUED | OUTPATIENT
Start: 2019-03-28 | End: 2019-03-29 | Stop reason: HOSPADM

## 2019-03-28 RX ORDER — ACETAMINOPHEN 325 MG/1
650 TABLET ORAL EVERY 4 HOURS PRN
Status: DISCONTINUED | OUTPATIENT
Start: 2019-03-28 | End: 2019-03-29 | Stop reason: HOSPADM

## 2019-03-28 RX ORDER — 0.9 % SODIUM CHLORIDE 0.9 %
1000 INTRAVENOUS SOLUTION INTRAVENOUS ONCE
Status: DISCONTINUED | OUTPATIENT
Start: 2019-03-28 | End: 2019-03-29 | Stop reason: HOSPADM

## 2019-03-28 RX ORDER — SODIUM CHLORIDE 0.9 % (FLUSH) 0.9 %
10 SYRINGE (ML) INJECTION PRN
Status: DISCONTINUED | OUTPATIENT
Start: 2019-03-28 | End: 2019-03-29 | Stop reason: HOSPADM

## 2019-03-28 RX ORDER — LANOLIN ALCOHOL/MO/W.PET/CERES
3 CREAM (GRAM) TOPICAL NIGHTLY
Status: DISCONTINUED | OUTPATIENT
Start: 2019-03-28 | End: 2019-03-29 | Stop reason: HOSPADM

## 2019-03-28 RX ORDER — ASPIRIN 81 MG/1
81 TABLET, CHEWABLE ORAL DAILY
Status: CANCELLED | OUTPATIENT
Start: 2019-03-28

## 2019-03-28 RX ORDER — ONDANSETRON 2 MG/ML
4 INJECTION INTRAMUSCULAR; INTRAVENOUS EVERY 6 HOURS PRN
Status: DISCONTINUED | OUTPATIENT
Start: 2019-03-28 | End: 2019-03-29 | Stop reason: HOSPADM

## 2019-03-28 RX ORDER — DIGOXIN 125 MCG
125 TABLET ORAL DAILY
Status: DISCONTINUED | OUTPATIENT
Start: 2019-03-29 | End: 2019-03-29 | Stop reason: HOSPADM

## 2019-03-28 RX ORDER — SODIUM CHLORIDE 0.9 % (FLUSH) 0.9 %
10 SYRINGE (ML) INJECTION EVERY 12 HOURS SCHEDULED
Status: DISCONTINUED | OUTPATIENT
Start: 2019-03-28 | End: 2019-03-29 | Stop reason: HOSPADM

## 2019-03-28 RX ORDER — FEEDER CONTAINER WITH PUMP SET
1 EACH MISCELLANEOUS 2 TIMES DAILY WITH MEALS
Status: DISCONTINUED | OUTPATIENT
Start: 2019-03-28 | End: 2019-03-28 | Stop reason: CLARIF

## 2019-03-28 RX ORDER — METHIMAZOLE 10 MG/1
5 TABLET ORAL DAILY
Status: DISCONTINUED | OUTPATIENT
Start: 2019-03-29 | End: 2019-03-29 | Stop reason: HOSPADM

## 2019-03-28 RX ORDER — CYANOCOBALAMIN (VITAMIN B-12) 1000 MCG
1 TABLET, EXTENDED RELEASE ORAL 2 TIMES DAILY WITH MEALS
Status: ON HOLD | COMMUNITY
End: 2019-03-29 | Stop reason: HOSPADM

## 2019-03-28 RX ADMIN — SODIUM CHLORIDE, PRESERVATIVE FREE 10 ML: 5 INJECTION INTRAVENOUS at 21:32

## 2019-03-28 RX ADMIN — SODIUM CHLORIDE 1000 ML: 9 INJECTION, SOLUTION INTRAVENOUS at 15:18

## 2019-03-28 RX ADMIN — ASPIRIN 81 MG: 81 TABLET, COATED ORAL at 21:32

## 2019-03-28 ASSESSMENT — ENCOUNTER SYMPTOMS
RESPIRATORY NEGATIVE: 1
ALLERGIC/IMMUNOLOGIC NEGATIVE: 1
EYES NEGATIVE: 1
GASTROINTESTINAL NEGATIVE: 1

## 2019-03-28 NOTE — ED NOTES
Bed: ED-20  Expected date:   Expected time:   Means of arrival:   Comments:  medic     Christina Harris, RN  03/28/19 0380

## 2019-03-28 NOTE — PROGRESS NOTES
Medication History  [facility]    Patient Name: Long Okeefe 12/15/1921     Medication history has been completed by: Tyshawn Novak CPhT    Source(s) of information: [facility]    Primary Care Physician: Angela Bauer MD     Pharmacy: [id number] as of 03/28/2019 - Review Complete 03/18/2019   Allergen Reaction Noted    Dairycare [lactase-lactobacillus]  03/18/2019    Lactose  03/18/2019    Milk-related compounds  03/18/2019        Prior to Admission medications    Medication Sig Start Date End Date Taking? Authorizing Provider   calcium citrate-vitamin D (CITRICAL + D) 315-250 MG-UNIT TABS per tablet Take 1 tablet by mouth 2 times daily (with meals)   Yes Historical Provider, MD   methimazole (TAPAZOLE) 5 MG tablet Take 1 tablet by mouth 2 times daily 2/26/19  Yes Angela Bauer MD   furosemide (LASIX) 20 MG tablet Take 2 tablets by mouth daily 10/24/18  Yes LIANA HERNANDEZ CNP   digoxin (LANOXIN) 125 MCG tablet Take 1 tablet by mouth daily 10/24/18  Yes LIANA HERNANDEZ CNP   Multiple Vitamin (MULTI-VITAMIN DAILY PO) Take 1 tablet by mouth daily   Yes Historical Provider, MD   Lift Chair [id number] [facility] by Does not apply route 3/18/19   Angela Bauer MD           Nutritional Supplements (ENSURE HIGH PROTEIN) LIQD 3 times per day (patient needs lactose free) 11/6/18   Angela Bauer, [address] [facility] by Does not apply route 10/1/18   Angela Bauer MD               Medications added or changed (ex. new medication, dosage change, interval change, formulation change):  Citracal + D new medication    Medications removed from list (include reason, ex. noncompliance, medication cost, therapy complete etc.):   Calcium no longer taking (taking citracal+D)  Methimazole duplicate therapy    Other Comments:  Updated med list per facility list provided  Patient told family member that she felt facility gave he lasix too early today and that is causing her symptoms.  Informed TRACI Power of this information    To my knowledge the above medication history is accurate as of 3/28/2019 4:17 PM.   Altaf Last, Tricia   3/28/2019 4:17 PM

## 2019-03-28 NOTE — TELEPHONE ENCOUNTER
Charlie Acron called to let you know that she saw a change in patient this afternoon. She had a droop to left side of face and a draw of arm and so she sent her to the ER. She just wanted to call and let you know.

## 2019-03-28 NOTE — ED TRIAGE NOTES
PT presents to the ED today with c/o weakness at the nursing home today. In route with EMS pt blood sugar was at 61 and oral glucose was given at that time.

## 2019-03-28 NOTE — ED NOTES
[place],lactic acid,venous ph and type & screen was drawn on patient     Norah Montesem  03/28/19 3858

## 2019-03-28 NOTE — H&P
History and Physical  Magalie Claros MD    3/28/2019  Marcell Vigil  12/15/1921    PCP: Marina Obando MD    Cc: weakness LUE    HPI: Marcell Vigil is a 80 y.o.  female  who presents with a spell of staring this morning accompanied by L facial droop and drooling and L hand weakness, which resolved by the time she got the ER. Pt has a hx of 4 mm subdural bleed following a fall last September, which is no longer present on the CT of her head, which shows vascular changes due to age only. She has hx of AF w/ pacer, no longer on AC due to the bleed, and frequent falls. Her only medication is dig for rate control, and tapazole for hyperthyroidism. Caregiver feels as though she would do better on one dose per day rather than two. Pt states she is feeling fine. Appetite is great, she ambulates with minimal to no assist at home, denies abd pain, CP or H/A. She has arthritis pain in knees and hips for which she uses prn tylenol. EF 70% with diastolic dysfunction likely, d/t severe MR. EKG and trop nml. TSH elevated at 7. Problem List: Present on Admission:   TIA (transient ischemic attack)       PMHX:   Past Medical History:   Diagnosis Date    Atrial fibrillation and flutter (HCC)     CHF (congestive heart failure) (HCC)     Diverticulosis     Enlarged thyroid     GERD (gastroesophageal reflux disease)     H/O echocardiogram 05/08/2017    EF 60%     H/O subconjunctival hemorrhage     History of CT scan of head 07/09/2018    No acute intracranial abnormality. Diffuse atrophic changes with findings suggesting chronic microvascular ischemia.     History of echocardiogram 09/26/2016    EF60%-moderate AS, moderate MR, MILD AI, enlarged LA, MODERATE TR    IBS (irritable bowel syndrome)     Mitral regurgitation     moderate severity; Tricuspid Regurgitation: mild severity    Osteoarthritis     primarily affecting the neck, fingers and knees    Osteoporosis     Pacemaker     Palpitations     Pedal edema      PSHX:  has a past surgical history that includes Cataract removal (12/2008); Varicose vein surgery (1960's per old chart); cardiovascular stress test (11/2007); Pacemaker insertion (03/14/2014); Bladder surgery; Colonoscopy (2010); Breast biopsy (1965); hernia repair (1986); Inguinal hernia repair (08-20-12); Cholecystectomy (1980's); Appendectomy (1980's); Tonsillectomy and adenoidectomy (1941); and pr sono guide needle biopsy (12/2018). Meds - list of home medications reviewed in electronic chart and confirmed  Allergies: Allergies   Allergen Reactions    Dairycare [Lactase-Lactobacillus]     Lactose     Milk-Related Compounds        FAM HX: family history includes Asthma in her son; Cancer in her brother and brother; Diabetes in her sister; Early Death in her brother; Early Death (age of onset: 40) in her daughter; Early Death (age of onset: 5) in her brother; Heart Disease in her brother and sister; High Blood Pressure in her sister; Other in her brother, father, and sister. Soc HX:   Social History     Socioeconomic History    Marital status:       Spouse name: None    Number of children: None    Years of education: None    Highest education level: None   Occupational History    None   Social Needs    Financial resource strain: None    Food insecurity:     Worry: None     Inability: None    Transportation needs:     Medical: None     Non-medical: None   Tobacco Use    Smoking status: Never Smoker    Smokeless tobacco: Never Used   Substance and Sexual Activity    Alcohol use: No    Drug use: No    Sexual activity: Never   Lifestyle    Physical activity:     Days per week: None     Minutes per session: None    Stress: None   Relationships    Social connections:     Talks on phone: None     Gets together: None     Attends [employer] service: None     Active member of club or organization: None     Attends meetings of clubs or organizations: None     Relationship status: None    Intimate partner violence:     Fear of current or ex partner: None     Emotionally abused: None     Physically abused: None     Forced sexual activity: None   Other Topics Concern    None   Social History Narrative    None       ROS: Is as noted above in HPI. Complete system review is done and is negative except as noted above. Negative except for as listed above    EXAM:  Blood pressure (!) 123/103, pulse 80, temperature 97.5 °F (36.4 °C), resp. rate 17, height 5' 1\" (1.549 m), weight 110 lb (49.9 kg), SpO2 98 %, not currently breastfeeding. General appearance: No apparent distress, appears stated age and cooperative. Skin: Skin color, texture, turgor normal for age. No rashes/ lesions. HEENT Normocephalic, atraumatic without obvious deformity. PERRL  EOMI Conjunct/corneas clear. Mucosa moist.  Neck: Supple, No JVD/bruits. Trachea midline without thyromegaly or adenopathy and with full range of motion. Lungs: Fair respiratory effort. Clear to ascultation, bilaterally without rales  / wheezes / rhonchi   Heart: Afib with MVR. No periph edema. Abdomen: Soft, non-tender or non-distended without rigidity or guarding and positive bowel sounds all four quadrants. No organomegaly or masses. Extremities: No clubbing, cyanosis, bilaterally. Full range of motion without deformity. Neurologic: A & O X 3,  W/ really strong bilat , sensory/motor intact BUE & BLE. CN's 2-12 intact; no focal signs  Mental status: thought content appropriate. CXR:  I reviewed the CXR with the following interpretation: wnl  EKG:  I reviewed the EKG with the following interpretation: a fib     LABS in ER reviewed      ASSESSMENT AND PLAN:      1. TIA in Pt w/ hx small IC bleed 6 months ago and frequent falls. Antiplatelet risk > benefit--reviewed with Pt and caregiver who agree. Statin and MRI also discussed and declined, as is HTN mgt. HTN -- permissive at this time, and likely indefinitely.     AF, stable - cpm    Hyperthyroidism, slightly overmedicated. Will cut from bid to daily dose.      DVT prophy -  Not indicated- likely d/c in AM  GI ppx   none indicated    Expected LOS 1 night    Patient Active Problem List   Diagnosis    Pedal edema    Atrial fibrillation and flutter (HCC)    CHF (congestive heart failure) (HCC)    Mitral regurgitation    Spinal stenosis of lumbar region    Risk for falls    Thyroid nodule    Senile debility    Primary osteoarthritis of both hips    Primary osteoarthritis of right knee    Osteoarthritis of left knee    Osteoarthritis of lumbar spine    TIA (transient ischemic attack)     ______________________________________________________________    Sirena Dunham M.D.  3/28/2019

## 2019-03-28 NOTE — ED PROVIDER NOTES
[facility]      TRIAGE CHIEF COMPLAINT:   Fatigue and Facial Droop (left sided)      [place]:  Uzma Galan is a 80 y.o. female that presents with complaint of fatigue, strokelike symptoms. Power of  states before arrival patient had sudden onset of left-sided facial droop left arm weakness is now resolved. Patient is ANO ×3 she denies complaints. No other questions or concerns. REVIEW OF SYSTEMS:  At least 10 systems reviewed and otherwise acutely negative except as in the [address] Review of Systems   Constitutional: Positive for fatigue. HENT: Negative. Eyes: Negative. Respiratory: Negative. Cardiovascular: Negative. Gastrointestinal: Negative. Endocrine: Negative. Genitourinary: Negative. Musculoskeletal: Negative. Skin: Negative. Allergic/Immunologic: Negative. Neurological: Positive for facial asymmetry and weakness. Hematological: Negative. Psychiatric/Behavioral: Negative. All other systems reviewed and are negative. Past Medical History:   Diagnosis Date    Atrial fibrillation and flutter (HCC)     CHF (congestive heart failure) (HCC)     Diverticulosis     Enlarged thyroid     GERD (gastroesophageal reflux disease)     H/O echocardiogram 05/08/2017    EF 60%     H/O subconjunctival hemorrhage     History of CT scan of head 07/09/2018    No acute intracranial abnormality. Diffuse atrophic changes with findings suggesting chronic microvascular ischemia.     History of echocardiogram 09/26/2016    EF60%-moderate AS, moderate MR, MILD AI, enlarged LA, MODERATE TR    IBS (irritable bowel syndrome)     Mitral regurgitation     moderate severity; Tricuspid Regurgitation: mild severity    Osteoarthritis     primarily affecting the neck, fingers and knees    Osteoporosis     Pacemaker     Palpitations     Pedal edema      Past Surgical History:   Procedure Laterality Date    APPENDECTOMY  1980's    with Christella Cheadle BLADDER SURGERY      per old chart had bladder bx and fulgeration done 7/2007    BREAST BIOPSY  1965    left breast - negative for cancer    CARDIOVASCULAR STRESS TEST  11/2007    treadmill    CATARACT REMOVAL  12/2008    Left    CHOLECYSTECTOMY  1980's    with appy    COLONOSCOPY  2010   [address]    left inguinal    INGUINAL HERNIA REPAIR  08-20-12    Right Side     PACEMAKER INSERTION  03/14/2014    L upper chest// per old chart had 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    IA SONO GUIDE NEEDLE BIOPSY  12/2018    TONSILLECTOMY AND ADENOIDECTOMY  1941    VARICOSE VEIN SURGERY  1960's per old chart    stripping bilat legs     Family History   Problem Relation Age of Onset    High Blood Pressure Sister     Other Sister         sinus problems, vericose veins    Other Father         GI probloems - hx obstruction    Heart Disease Sister         CHF    Diabetes Sister         s/p partial pancreas removal    Early Death Daughter 40        unknown cause    Asthma Son     Cancer Brother         leukemia    Heart Disease Brother         CABG    Early Death Brother         Murdered    Other Brother         dies at 80. unknown cause.  Cancer Brother         throat    Early Death Brother 5        pneumonia     Social History     Socioeconomic History    Marital status:       Spouse name: Not on file    Number of children: Not on file    Years of education: Not on file    Highest education level: Not on file   Occupational History    Not on file   Social Needs    Financial resource strain: Not on file    Food insecurity:     Worry: Not on file     Inability: Not on file    Transportation needs:     Medical: Not on file     Non-medical: Not on file   Tobacco Use    Smoking status: Never Smoker    Smokeless tobacco: Never Used   Substance and Sexual Activity    Alcohol use: No    Drug use: No    Sexual activity: Never   Lifestyle    Physical activity:     Days per week: Not on file     Minutes per session: Not on file    Stress: Not on file   Relationships    Social connections:     Talks on phone: Not on file     Gets together: Not on file     Attends Scientologist service: Not on file     Active member of club or organization: Not on file     Attends meetings of clubs or organizations: Not on file     Relationship status: Not on file    Intimate partner violence:     Fear of current or ex partner: Not on file     Emotionally abused: Not on file     Physically abused: Not on file     Forced sexual activity: Not on file   Other Topics Concern    Not on file   Social History Narrative    Not on file     Current Facility-Administered Medications   Medication Dose Route Frequency Provider Last Rate Last Dose    0.9 % sodium chloride bolus  1,000 mL Intravenous Once [employer], DO        0.9 % sodium chloride bolus  1,000 mL Intravenous Once [employer], DO 1,000 mL/hr at 03/28/19 1518 1,000 mL at 03/28/19 1518     Current Outpatient Medications   Medication Sig Dispense Refill    Lift Chair MISC by Does not apply route 1 each 0    methimazole (TAPAZOLE) 5 MG tablet methimazole 5 mg tablet   TK 1 T PO BID      methimazole (TAPAZOLE) 5 MG tablet Take 1 tablet by mouth 2 times daily 180 tablet 1    Nutritional Supplements (ENSURE HIGH PROTEIN) LIQD 3 times per day (patient needs lactose free) 90 Can 11    furosemide (LASIX) 20 MG tablet Take 2 tablets by mouth daily 60 tablet 3    digoxin (LANOXIN) 125 MCG tablet Take 1 tablet by mouth daily 30 tablet 3    Multiple Vitamin (MULTI-VITAMIN DAILY PO) Take 1 tablet by mouth daily     [address] [facility] [address] by Does not apply route 1 each 0    calcium carbonate (OSCAL) 500 MG TABS tablet Take 500 mg by mouth 2 times daily         Allergies   Allergen Reactions    Dairycare [Lactase-Lactobacillus]     Lactose     Milk-Related Compounds      Current Facility-Administered Medications   Medication normal. Right eye exhibits no discharge. Left eye exhibits no discharge. No scleral icterus. Neck: Normal range of motion, full passive range of motion without pain and phonation normal. No JVD present. Carotid bruit is not present. No neck rigidity. No edema, no erythema and normal range of motion present. Cardiovascular: Normal rate, regular rhythm, normal heart sounds and intact distal pulses. Exam reveals no gallop and no friction rub. No murmur heard. Pulmonary/Chest: Effort normal and breath sounds normal. No stridor. No respiratory distress. She has no wheezes. She has no rales. Abdominal: Soft. Bowel sounds are normal. She exhibits no distension and no mass. There is no tenderness. There is no rigidity, no rebound, no guarding, no tenderness at McBurney's point and negative Hodgson's sign. Musculoskeletal: Normal range of motion. She exhibits no edema, tenderness or deformity. Neurological: She is alert and oriented to person, place, and time. She has normal strength. She is not disoriented. She displays no atrophy and no tremor. No cranial nerve deficit or sensory deficit. She exhibits normal muscle tone. She displays no seizure activity. Coordination normal. GCS eye subscore is 4. GCS verbal subscore is 5. GCS motor subscore is 6. Normal finger to nose bilaterally   Skin: Skin is warm. No rash noted. She is not diaphoretic. No erythema. No pallor. Psychiatric: She has a normal mood and affect. Her behavior is normal. Judgment and thought content normal.   Nursing note and vitals reviewed.         I have reviewed andinterpreted all of the currently available lab results from this visit (if applicable):         Radiographs (if obtained):  [] The following radiograph was interpreted by myself in the absence of a radiologist:  [x] Radiologist's Report Reviewed:    CT Brain, CXR    Xr Lumbar Spine (min 4 Views)    Result Date: 3/11/2019  EXAMINATION: 5 XRAY VIEWS OF THE LUMBAR SPINE 3/11/2019 3:28 pm COMPARISON: CT abdomen pelvis 04/22/2014 HISTORY: ORDERING SYSTEM PROVIDED HISTORY: Spinal stenosis of lumbar region, unspecified whether neurogenic claudication present TECHNOLOGIST PROVIDED HISTORY: Acuity: Unknown Type of Exam: Subsequent/Follow-up Mechanism of Injury: Trauma from known fall in September 2018. Patient confused. Family member states that patient has fear of falling now when being moved and complains of pain in right knee and left hip when moving her. Patient was unable to stand on her own at all. Patient was unable to move into positions for imaging by herself at all. FINDINGS: Bones: Five non-rib-bearing lumbar vertebral bodies are present. Vertebral body heights and alignment are maintained. Degenerative changes: Moderate to advanced lumbar spondylosis. Limited evaluation of the foramina on oblique views, however there is at least mild foraminal narrowing along the upper lumbar spine, moderate at L3-L4 and L5-S1. Soft Tissues: Normal soft tissues. Moderate lumbar spondylosis and facet arthrosis with foraminal narrowing most pronounced at L3-L4 and L5-S1 similar to prior CT abdomen pelvis from 04/22/2014. If neurogenic symptoms are present, MRI may be of value to further characterize. Xr Knee Bilateral Standing    Result Date: 3/11/2019  EXAMINATION: ONE XRAY VIEWS OF THE BILATERAL KNEES 3/11/2019 3:28 pm COMPARISON: None. HISTORY: ORDERING SYSTEM PROVIDED HISTORY: Spinal stenosis of lumbar region, unspecified whether neurogenic claudication present TECHNOLOGIST PROVIDED HISTORY: Acuity: Unknown Type of Exam: Subsequent/Follow-up Mechanism of Injury: Trauma from known fall in September 2018. Patient confused. Family member states that patient has fear of falling now when being moved and complains of pain in right knee and left hip when moving her. Patient was unable to stand on her own at all. Patient was unable to move into positions for imaging by herself at all.  FINDINGS: A single frontal view of the bilateral knees is submitted for review. No fracture or dislocation identified. Cannot evaluate for effusion. Moderate to severe degenerative changes in the bilateral lateral compartments and mild degenerative changes in the bilateral medial compartments. Mild chondrocalcinosis. The soft tissues are unremarkable. 1. No acute osseous abnormality. Limited evaluation with a single frontal view of the bilateral knees. 2. Moderate to severe degenerative changes in the bilateral lateral compartments. Xr Hip 3-4 Vw W Pelvis Bilateral    Result Date: 3/11/2019  EXAMINATION: SINGLE XRAY VIEW OF THE PELVIS AND 2 XRAY VIEWS OF EACH OF THE BILATERAL HIPS 3/11/2019 3:28 pm COMPARISON: CT abdomen and pelvis 04/22/2014. HISTORY: ORDERING SYSTEM PROVIDED HISTORY: Spinal stenosis of lumbar region, unspecified whether neurogenic claudication present TECHNOLOGIST PROVIDED HISTORY: Acuity: Unknown Type of Exam: Subsequent/Follow-up Mechanism of Injury: Trauma from known fall in September 2018. Patient confused. Family member states that patient has fear of falling now when being moved and complains of pain in right knee and left hip when moving her. Patient was unable to stand on her own at all. Patient was unable to move into positions for imaging by herself at all. FINDINGS: Degenerative changes of the visualized lower lumbar spine. The bilateral sacroiliac joints are intact. Mild bilateral hip and pubic symphysis degenerative changes. No fracture or dislocation. Atherosclerotic vascular calcifications are seen. 1. No acute osseous abnormality. 2. Mild bilateral hip and pubic symphysis degenerative changes. 3. Multilevel degenerative changes of the lower lumbar spine.        EKG (if obtained): (All EKG's are interpreted by myself in the absence of a cardiologist)    12 lead EKG per my interpretation:  Atrial Fibrillation 71  Axis is   Normal  QTc is  406  There is specific T wave changes appreciated. T-wave inversion V5 V6  There is no specific ST wave changes appreciated. Prior EKG to compare with was not available       Total critical care time today provided was at least 32 minutes. This excludes seperately billable procedure. Critical care time provided for reviewing labs, images consulting med, family, giving IVF's that required close evaluation and/or intervention with concern for patient decompensation. MDM:    Patient here with strokelike symptoms. Again before arrival she had sudden onset of left-sided facial droop left arm numbness and weakness that has now resolved. Due to this stroke alert was not called. Vital signs are stable she was tachycardic given IV fluids. Workup shows elevated BNP, pleural effusion, troponin labs and imaging otherwise negative awaiting urine. Due to elevated cardiac enzymes and strokelike symptoms I did admit her to the hospital.  Patient otherwise stable. CLINICAL IMPRESSION:  Final diagnoses:   Fatigue, unspecified type   Troponin level elevated   Elevated brain natriuretic peptide (BNP) level   Pleural effusion   Stroke-like episode ([facility] [address]       (Please note that portions of this note may have been completed with a voice recognition program. Efforts were made to edit the dictations but occasionally words aremis-transcribed.)    DISPOSITION REFERRAL (if applicable):  No follow-up provider specified.     DISPOSITION MEDICATIONS (if applicable):  New Prescriptions    No medications on file          Katalina Szymanski, [facility],   03/28/19 9408

## 2019-03-29 ENCOUNTER — TELEPHONE (OUTPATIENT)
Dept: FAMILY MEDICINE CLINIC | Age: 84
End: 2019-03-29

## 2019-03-29 VITALS
OXYGEN SATURATION: 98 % | TEMPERATURE: 98.3 F | HEART RATE: 89 BPM | BODY MASS INDEX: 20.6 KG/M2 | SYSTOLIC BLOOD PRESSURE: 147 MMHG | RESPIRATION RATE: 22 BRPM | DIASTOLIC BLOOD PRESSURE: 89 MMHG | WEIGHT: 109.1 LBS | HEIGHT: 61 IN

## 2019-03-29 PROBLEM — I61.9 ICH (INTRACEREBRAL HEMORRHAGE) (HCC): Status: ACTIVE | Noted: 2019-03-29

## 2019-03-29 LAB
CHOLESTEROL: 147 MG/DL
DIGOXIN LEVEL: 1.4 NG/ML (ref 0.8–2)
DOSE AMOUNT: NORMAL
DOSE TIME: NORMAL
HDLC SERPL-MCNC: 60 MG/DL
LDL CHOLESTEROL DIRECT: 97 MG/DL
TRIGL SERPL-MCNC: 54 MG/DL

## 2019-03-29 PROCEDURE — 36415 COLL VENOUS BLD VENIPUNCTURE: CPT

## 2019-03-29 PROCEDURE — 80061 LIPID PANEL: CPT

## 2019-03-29 PROCEDURE — 83721 ASSAY OF BLOOD LIPOPROTEIN: CPT

## 2019-03-29 PROCEDURE — 6370000000 HC RX 637 (ALT 250 FOR IP): Performed by: NURSE PRACTITIONER

## 2019-03-29 PROCEDURE — 80162 ASSAY OF DIGOXIN TOTAL: CPT

## 2019-03-29 PROCEDURE — 6370000000 HC RX 637 (ALT 250 FOR IP): Performed by: FAMILY MEDICINE

## 2019-03-29 PROCEDURE — G0378 HOSPITAL OBSERVATION PER HR: HCPCS

## 2019-03-29 RX ORDER — METHIMAZOLE 5 MG/1
5 TABLET ORAL DAILY
Qty: 1 TABLET | Refills: 0
Start: 2019-03-29 | End: 2019-11-15 | Stop reason: SDUPTHER

## 2019-03-29 RX ADMIN — DIGOXIN 125 MCG: 125 TABLET ORAL at 11:41

## 2019-03-29 RX ADMIN — ASPIRIN 81 MG: 81 TABLET, COATED ORAL at 11:41

## 2019-03-29 RX ADMIN — METHIMAZOLE 5 MG: 10 TABLET ORAL at 11:41

## 2019-03-29 ASSESSMENT — PAIN SCALES - GENERAL: PAINLEVEL_OUTOF10: 0

## 2019-03-29 NOTE — TELEPHONE ENCOUNTER
Nurse Magdy Thao called to schedule. Will need to call Radha Brower to do the actual transitional care call. Was informed Radha Brower going to a  today so if we don't do today will need to do Monday.

## 2019-03-29 NOTE — DISCHARGE SUMMARY
[facility] 12/15/1921 [id number]  PCP:  Angela Bauer MD    Admit date: 3/28/2019  Admitting Physician: Rodrigue Cummings MD    Discharge date: 3/29/2019 Discharge Physician: Rodrigue Cummings MD      Reason for admission:   Chief Complaint   Patient presents with    Fatigue    Facial Droop     left sided       Discharge Diagnoses Include:  1. TIA in Pt w/ hx small IC bleed 6 months ago and frequent falls. Antiplatelet risk > benefit--reviewed with Pt and caregiver who agree. Statin and MRI also discussed and declined, as is aggressive HTN mgt. 2. Mild to mod chronic dementia  3. HTN -- permissive at this time, and likely indefinitely  4. AF, stable - cpm  5. Hyperthyroidism, slightly overmedicated (TSH - > 7). Will cut from bid to daily dose. Present on Admission:   TIA (transient ischemic attack)   Atrial fibrillation and flutter ([facility] [address]   ICH (intracerebral hemorrhage) [facility])      Hospital Course[de-identified] Pt admitted with transient staring spell accompanied by dysarthria and drooling and L hand weakness, which resolved by the time she arrived at the ER. She has some underlying mild dementia, but has capacity  and she and her caregiver/ POA desired conservative mgt. No MRI was undertaken and no ASA or other antiplt due to her recent hx of ICH (Sept 2018, 4mm, xfer to [facility]--no surg, appears to have resolved) and falls. BP moderately elevated, asymptomatic and in setting of TIA, permissive HTN is preferred. Doubt she would benefit from more aggressive control mortality or morbidity-wise, even after the first post-TIA days have passed, but this can be discussed with her at f/u. Have suggested considering hospice to support Pt and caregiver for expected increase in TIA/ CVA now that she has been off AC for Afib for > 6 mos due to bleed, and Pt/ family prefer conservative mgt.     Pt was personally examined by me on the day of discharge with the following findings: awake, alert, pleasant, intermittently and mildly

## 2019-03-29 NOTE — CARE COORDINATION
Prophylaxis given by day 2 of admission ( day 1 starts on adm date,this excludes obs status and ED)  No  If VTE not ordered, did the physician chart BOTH a pharmacological and mechanical reasons ( in context to VTE) why it wan not ordered? Yes  Was the patient given an antithrombotic by end of day 2? (day 1 starts on patients arrival date)  Yes  If the pt did not have an order for antithrombotic by day 2, did the physician document why the pt did not receive it? ( NPO status and an allergy to an antithrombotic are not acceptable reasons)  NA      Did the pt receive a list of medications that are DC'D on the AVS ?  No-discharged to [facility] [place]  Was the pt assessed by PT/OT or SLP? If not is there a physician note that pt is back to baseline,no PT/OT/SLP eval needed or if there is an outpt referral. PT/OT phone #[phone] SLP phone # [phone]  Yes  If pt has history of A-fib/flutter do they have a RX for an anticoagulation medication or a reason charted by physician on why one was not prescribed to the patient?   Yes  Does the pt have  RX for a statin on DC? (pt does not need a RX for the following: LDL less than 70 MG/DL, allergy/intolerance or a written reason why one was not prescribed per physician)  Yes

## 2019-03-29 NOTE — CARE COORDINATION
[address] Transitions Interview     3/29/2019    Patient: Marifer Guzman Patient : 12/15/1921   MRN: [id number]  Reason for Admission: TIA  RARS: Readmission Risk Score: 0    Spoke with: Patient; Fidelina Garcia POALTA    Readmission Risk  Patient Active Problem List   Diagnosis    Pedal edema    Atrial fibrillation and flutter (HCC)    CHF (congestive heart failure) (HCC)    Mitral regurgitation    Spinal stenosis of lumbar region    Risk for falls    Thyroid nodule    Senile debility    Primary osteoarthritis of both hips    Primary osteoarthritis of right knee    Osteoarthritis of left knee    Osteoarthritis of lumbar spine    TIA (transient ischemic attack)       Inpatient Assessment  Care Transitions Summary    Care Transitions Inpatient Review  Medication Review  Are you able to afford your medications?:  Yes  How often do you have difficulty taking your medications?:  I always take them as prescribed.   Housing Review  Who do you live with?:  Alone, Other Caregiver  Are you an active caregiver in your home?:  No  Social Support  Do you have a ?:  No  Do you have a [facility]?:  No  Durable Medical Equipment  Patient DME:  Estevan Luna 65 bed, Chair lift, Other  Other Patient DME:  hui [employer]va  Functional Review  Ability to seek help/take action for Emergent/Urgent situations i.e. fire, crime, inclement weather or health crisis.:  Needs Assistance  Ability handle personal hygiene needs (bathing/dressing/grooming):  Needs Assistance  Ability to manage medications:  Dependent  Ability to prepare food:  Dependent  Ability to maintain home (clean home, laundry):  Dependent  Ability to drive and/or has transportation:  Dependent  Ability to do shopping:  Dependent  Ability to manage finances:  Dependent  Is patient able to live independently?:  Yes  Hearing and Vision  Visual Impairment:  Visual impairment (Glasses/contacts)  Hearing Impairment:  Hard of hearing  Care Transitions Interventions  No Identified Needs         Follow Up: Met w/ patient, unable to complete assessment d/t confusion. [facility] business card left at bedside. Patient has no local family support. Call to [facility], [place]; Oriented to CT program.     Lives alone at [facility]. Has private duty caregiver, [employer], who provides care 10-12 hours daily, 7 days/week. Caregiver assists w/ all adls, provides transportation, housekeeping, meals. Patient alone thru hs. DME= walker, w/c, cane, hospital bed, lift chair, shower chair, grab bars in bathroom. Follows w/ Dr Surinder Solis. Uses [address]. Discussed discharge options. Plan is for patient to return to [facility] with Clayborn Ohm to provide care. Denies need for HHC/SNF. Agreeable to continued CT follow-up. T/C Dr Surinder Solis office; Scheduled f/u appt 4/3 11:45 am    Future Appointments   Date Time Provider Liana Bacon   7/22/2019 11:10 AM Aydin Silverman MD [facility]   8/26/2019  2:45 PM Luann Solitario MD [facility]       Health Maintenance  There are no preventive care reminders to display for this patient.     Elena Marshall RN

## 2019-03-29 NOTE — PROGRESS NOTES
Alpesh Jacobson caregiver returned call and stated she is on her way to hospital and notified pt will be discharged today and stated \"ok\"

## 2019-04-01 LAB
EKG DIAGNOSIS: NORMAL
EKG Q-T INTERVAL: 374 MS
EKG QRS DURATION: 88 MS
EKG QTC CALCULATION (BAZETT): 406 MS
EKG R AXIS: -13 DEGREES
EKG T AXIS: -49 DEGREES
EKG VENTRICULAR RATE: 71 BPM

## 2019-04-02 NOTE — TELEPHONE ENCOUNTER
Edita Malhotra had concerns she is asking to be addressed prior to tomorrow's appointment. Wants to know why she was taken off furosemide and calcium? Also wants to know why they were told they should contact hospice?

## 2019-04-03 ENCOUNTER — CARE COORDINATION (OUTPATIENT)
Dept: CARE COORDINATION | Age: 84
End: 2019-04-03

## 2019-04-03 ENCOUNTER — OFFICE VISIT (OUTPATIENT)
Dept: FAMILY MEDICINE CLINIC | Age: 84
End: 2019-04-03
Payer: COMMERCIAL

## 2019-04-03 VITALS
BODY MASS INDEX: 21.36 KG/M2 | SYSTOLIC BLOOD PRESSURE: 100 MMHG | HEIGHT: 60 IN | WEIGHT: 108.8 LBS | DIASTOLIC BLOOD PRESSURE: 60 MMHG | HEART RATE: 96 BPM

## 2019-04-03 DIAGNOSIS — G45.9 TIA (TRANSIENT ISCHEMIC ATTACK): ICD-10-CM

## 2019-04-03 DIAGNOSIS — R54 SENILE DEBILITY: ICD-10-CM

## 2019-04-03 DIAGNOSIS — I48.92 ATRIAL FIBRILLATION AND FLUTTER (HCC): ICD-10-CM

## 2019-04-03 DIAGNOSIS — I50.22 CHRONIC SYSTOLIC CONGESTIVE HEART FAILURE (HCC): ICD-10-CM

## 2019-04-03 DIAGNOSIS — Z09 HOSPITAL DISCHARGE FOLLOW-UP: Primary | ICD-10-CM

## 2019-04-03 DIAGNOSIS — H61.21 IMPACTED CERUMEN, RIGHT EAR: ICD-10-CM

## 2019-04-03 DIAGNOSIS — I48.91 ATRIAL FIBRILLATION AND FLUTTER (HCC): ICD-10-CM

## 2019-04-03 PROCEDURE — 69210 REMOVE IMPACTED EAR WAX UNI: CPT | Performed by: FAMILY MEDICINE

## 2019-04-03 PROCEDURE — 99496 TRANSJ CARE MGMT HIGH F2F 7D: CPT | Performed by: FAMILY MEDICINE

## 2019-04-03 PROCEDURE — 1111F DSCHRG MED/CURRENT MED MERGE: CPT | Performed by: FAMILY MEDICINE

## 2019-04-03 RX ORDER — ACETAMINOPHEN 500 MG
1000 TABLET ORAL
COMMUNITY
Start: 2018-09-27 | End: 2019-04-08

## 2019-04-03 NOTE — CARE COORDINATION
Spoke with PCP regarding pt current status & possible referral to Hospice. At this time, they are agreeing to DNR & will consider hospice referral.  PCP would prefer [facility] not to engage with pt or POA at this time so as to not overwhelm the decision making process. Will discontinue [facility] outreach at this time, but would be happy to resume if PCP identifies a need for further Care Coordination. Vilma Cortes RN  Nurse Care Coordinator  [phone] [email or web address]

## 2019-04-03 NOTE — PROGRESS NOTES
Post-Discharge Transitional Care Management Services or Hospital Follow Up      Charly Cano   YOB: 1921    Date of Office Visit:  4/3/2019  Date of Hospital Admission: 3/28/19  Date of Hospital Discharge: 3/29/19  Risk of hospital readmission (high >=14%.  Medium >=10%) :Readmission Risk Score: 0      Care management risk score Rising risk (score 2-5) and Complex Care (Scores >=6): 9     Non face to face  following discharge, date last encounter closed (first attempt may have been earlier): 4/2/2019  9:29 AM    Call initiated 2 business days of discharge: Yes    Patient Active Problem List   Diagnosis    Pedal edema    Atrial fibrillation and flutter ([address]    CHF (congestive heart failure) ([facility] [address]    Mitral regurgitation    Spinal stenosis of lumbar region    Risk for falls    Thyroid nodule    Senile debility    Primary osteoarthritis of both hips    Primary osteoarthritis of right knee    Osteoarthritis of left knee    Osteoarthritis of lumbar spine    TIA (transient ischemic attack)    ICH (intracerebral hemorrhage) (HCC)       Allergies   Allergen Reactions    Dairycare [Lactase-Lactobacillus]     Lactose     Milk-Related Compounds        Medications listed as ordered at the time of discharge from [facility]- PSYCHIATRY & ADDICTIVE MED   Home Medication Instructions BARBRA:    Printed on:04/03/19 5864   Medication Information                      acetaminophen (TYLENOL) 500 MG tablet  Take 1,000 mg by mouth             digoxin (LANOXIN) 125 MCG tablet  Take 1 tablet by mouth daily             methimazole (TAPAZOLE) 5 MG tablet  Take 1 tablet by mouth daily             Multiple Vitamin (MULTI-VITAMIN DAILY PO)  Take 1 tablet by mouth daily             Nutritional Supplements (ENSURE HIGH PROTEIN) LIQD  3 times per day (patient needs lactose free)                   Medications marked \"taking\" at this time  Outpatient Medications Marked as Taking for the 4/3/19 encounter (Office Visit) with Steffanie Shea with warm tap water and a syringe. The patient tolerated the procedure well. The ear was visualized and was normal after the procedure.

## 2019-04-04 ENCOUNTER — TELEPHONE (OUTPATIENT)
Dept: FAMILY MEDICINE CLINIC | Age: 84
End: 2019-04-04

## 2019-04-08 ENCOUNTER — OFFICE VISIT (OUTPATIENT)
Dept: CARDIOLOGY CLINIC | Age: 84
End: 2019-04-08
Payer: COMMERCIAL

## 2019-04-08 VITALS
SYSTOLIC BLOOD PRESSURE: 100 MMHG | WEIGHT: 108.4 LBS | DIASTOLIC BLOOD PRESSURE: 66 MMHG | BODY MASS INDEX: 21.28 KG/M2 | HEIGHT: 60 IN | HEART RATE: 80 BPM

## 2019-04-08 DIAGNOSIS — G45.9 TIA (TRANSIENT ISCHEMIC ATTACK): Primary | ICD-10-CM

## 2019-04-08 PROCEDURE — 99214 OFFICE O/P EST MOD 30 MIN: CPT | Performed by: INTERNAL MEDICINE

## 2019-04-08 RX ORDER — FUROSEMIDE 20 MG/1
20 TABLET ORAL DAILY
COMMUNITY
End: 2019-10-01 | Stop reason: SDUPTHER

## 2019-04-08 NOTE — PROGRESS NOTES
Jeff Marquez MD        OFFICE  FOLLOWUP NOTE    Chief complaints: patient is here for management of CHF, PPM,AFIB, DIZZINESS, EPISTAXIS, possible subarachnoid hemorrhage or cortical petechial hemorrhage      Subjective: patient has TIA last week    HPI Alexys Fisher is a 80 y. o.year old who  has a past medical history of Atrial fibrillation and flutter ([address] CHF (congestive heart failure) ([facility] [address] Diverticulosis, Enlarged thyroid, GERD (gastroesophageal reflux disease), H/O echocardiogram, H/O subconjunctival hemorrhage, History of CT scan of head, History of echocardiogram, IBS (irritable bowel syndrome), Mitral regurgitation, Osteoarthritis, Osteoporosis, Pacemaker, Palpitations, and Pedal edema. and presents for management of CHF, PPM,AFIB, DIZZINESS, EPISTAXIS, possible subarachnoid hemorrhage or cortical petechial hemorrhage  which are well controlled  Patient was admitted for possible TIA and was treated conservatively without MRI, she had history of ICH in past on anticoagulation, she was not seen by neurologist, HER LASIX WAS STOPPED    Current Outpatient Medications   Medication Sig Dispense Refill    furosemide (LASIX) 20 MG tablet Take 20 mg by mouth daily      methimazole (TAPAZOLE) 5 MG tablet Take 1 tablet by mouth daily 1 tablet 0    digoxin (LANOXIN) 125 MCG tablet Take 1 tablet by mouth daily 30 tablet 3    Multiple Vitamin (MULTI-VITAMIN DAILY PO) Take 1 tablet by mouth daily       No current facility-administered medications for this visit. Allergies: Dairycare [lactase-lactobacillus];  Lactose; and Milk-related compounds  Past Medical History:   Diagnosis Date    Atrial fibrillation and flutter (HCC)     CHF (congestive heart failure) (HCC)     Diverticulosis     Enlarged thyroid     GERD (gastroesophageal reflux disease)     H/O echocardiogram 05/08/2017    EF 60%     H/O subconjunctival hemorrhage     History of CT scan of head 07/09/2018    No acute intracranial abnormality. Diffuse atrophic changes with findings suggesting chronic microvascular ischemia.  History of echocardiogram 09/26/2016    EF60%-moderate AS, moderate MR, MILD AI, enlarged LA, MODERATE TR    IBS (irritable bowel syndrome)     Mitral regurgitation     moderate severity; Tricuspid Regurgitation: mild severity    Osteoarthritis     primarily affecting the neck, fingers and knees    Osteoporosis     Pacemaker     Palpitations     Pedal edema      Past Surgical History:   Procedure Laterality Date    APPENDECTOMY  18's    with choley    BLADDER SURGERY      per old chart had bladder bx and fulgeration done 7/2007    BREAST BIOPSY  1965    left breast - negative for cancer    CARDIOVASCULAR STRESS TEST  11/2007    treadmill    CATARACT REMOVAL  12/2008    Left    CHOLECYSTECTOMY  1980's    with appy    COLONOSCOPY  2010   [facility]    left inguinal    INGUINAL HERNIA REPAIR  08-20-12    Right Side     PACEMAKER INSERTION  03/14/2014    L upper chest// per old chart had 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    NE SONO GUIDE NEEDLE BIOPSY  12/2018    TONSILLECTOMY AND ADENOIDECTOMY  1941    VARICOSE VEIN SURGERY  1960's per old chart    stripping bilat legs     Family History   Problem Relation Age of Onset    High Blood Pressure Sister     Other Sister         sinus problems, vericose veins    Other Father         GI probloems - hx obstruction    Heart Disease Sister         CHF    Diabetes Sister         s/p partial pancreas removal    Early Death Daughter 40        unknown cause    Asthma Son     Cancer Brother         leukemia    Heart Disease Brother         CABG    Early Death Brother         Murdered    Other Brother         dies at 80. unknown cause.     Cancer Brother         throat    Early Death Brother 5        pneumonia     Social History     Tobacco Use    Smoking status: Never Smoker    Smokeless tobacco: Never Used   Substance Use Topics    Alcohol use: No      [unfilled]  Review of Systems:   · Constitutional: No Fever or Weight Loss   · Eyes: No Decreased Vision  · ENT: No Headaches, Hearing Loss or Vertigo  · Cardiovascular: No chest pain, dyspnea on exertion, palpitations or loss of consciousness  · Respiratory: No cough or wheezing    · Gastrointestinal: No abdominal pain, appetite loss, blood in stools, constipation, diarrhea or heartburn  · Genitourinary: No dysuria, trouble voiding, or hematuria  · Musculoskeletal:  No gait disturbance, weakness or joint complaints  · Integumentary: No rash or pruritis  · Neurological: No TIA or stroke symptoms  · Psychiatric: No anxiety or depression  · Endocrine: No malaise, fatigue or temperature intolerance  · Hematologic/Lymphatic: No bleeding problems, blood clots or swollen lymph nodes  · Allergic/Immunologic: No nasal congestion or hives  All systems negative except as marked. Objective:  /66   Pulse 80   Ht 5' (1.524 m)   Wt 108 lb 6.4 oz (49.2 kg)   LMP  (LMP Unknown)   BMI 21.17 kg/m²   Wt Readings from Last 3 Encounters:   04/08/19 108 lb 6.4 oz (49.2 kg)   04/03/19 108 lb 12.8 oz (49.4 kg)   03/29/19 109 lb 1.6 oz (49.5 kg)     Body mass index is 21.17 kg/m². GENERAL - Alert, oriented, pleasant, in no apparent distress,normal grooming  HEENT - pupils are reactive to light and accomodation, cornea intact, conjunctive normal color, ears are normal in exam,throat exam in normal, teeth, gum and palate are normal, oral mucosa is normal without any notation of pallor or cyanosis  Neck - Supple. No jugular venous distention noted. No carotid bruits, no apical -carotid delay  Respiratory:  Normal breath sounds, No respiratory distress, No wheezing, No chest tenderness. ,no use of accessory muscles, diaphragm movement is normal  Cardiovascular: (PMI) apex non displaced,no lifts no thrills, no s3,no s4, Normal heart rate, Normal rhythm, No murmurs, No rubs, No darell. Carotid arteries pulse and amplitude are normal no bruit, no abdominal bruit noted ( normal abdominal aorta ausculation), femoral arteries pulse and amplitude are normal no bruit, pedal pulses are normal  Femoral pulses have normal amplitude, no bruits   Extremities - No cyanosis, clubbing, or significant edema, no varicose veins    Abdomen - No masses, tenderness, or organomegaly, no hepato-splenomegally, no bruits  Musculoskeletal - No significant edema, no kyphosis or scoliosis, no deformity in any extremity noted, muscle strength and tone are normal  Skin: no ulcer,no scar,no stasis dermatitis   Neurologic - alert oriented times 3,Cranial nerves II through XII are grossly intact. There were no gross focal neurologic abnormalities. All sensory and motor nerves examined and were normal  Psychiatric: normal mood and affect    Lab Results   Component Value Date    CKTOTAL 80 03/28/2019    TROPONINI 0.250 04/23/2014     BNP:    Lab Results   Component Value Date    BNP 1,086 04/22/2014     PT/INR:  No results found for: PTINR  No results found for: LABA1C  Lab Results   Component Value Date    CHOL 147 03/29/2019    TRIG 54 03/29/2019    HDL 60 03/29/2019    LDLCALC 114 (H) 05/12/2012    LDLDIRECT 97 03/29/2019     Lab Results   Component Value Date    ALT 12 03/28/2019    AST 24 03/28/2019     TSH:    Lab Results   Component Value Date    TSH 0.06 11/06/2018     PACER/ICD  INTERROGATION      LIFE/VOLTAGE:ADEQUATE  ATRIAL PACING:YES  VENTRICULAR PACING:YES  SHOCKS:No  ALL lead thresholds, impedence and functions are normal  Recommend to continue routine evaluation      Impression:  Lizabeth Schaefer is a 80 y. o.year old who  has a past medical history of Atrial fibrillation and flutter ([address] CHF (congestive heart failure) ([address] Diverticulosis, Enlarged thyroid, GERD (gastroesophageal reflux disease), H/O echocardiogram, H/O subconjunctival hemorrhage, History of CT scan of head, History of echocardiogram, IBS (irritable bowel syndrome), Mitral regurgitation, Osteoarthritis, Osteoporosis, Pacemaker, Palpitations, and Pedal edema. and presents with     Plan:    1. ? Recurrent TIA: discharged from hospital, she is not on antocoagulation or antiplatelets, will refer to neurologist to decide for possible aspirin use, her pacer is MRI safe  2. LASIX STOPPED: WILL REASSESS HER DURING NEXT VISIT  3. PAF: controlled, she has over active thyroid and just started methimazole,stop midodrine , off eliquis because of subconjuctival hemorrhage  4. Tachycardia:resolved, off  midodrione  5. Epistaxis:resolved  6. PPM: normal functioning, check as per schedule  7. CHF and aortic stenosis: MILD AS LAST YR, will repeat ECHO NEXT       All labs, medications and tests reviewed, continue all other medications of all above medical condition listed as is.     @[facility]@

## 2019-04-08 NOTE — PROGRESS NOTES
[id number] Score for Atrial Fibrillation Stroke Risk   Risk   Factors  Component Value   C CHF Yes 1   H HTN No 0   A2 Age >= 76 Yes,  (80 y.o.) 2   D DM No 0   S2 Prior Stroke/TIA Yes 2   V Vascular Disease No 0   A Age 74-69 No,  (80 y.o.) 0   Sc Sex female 1    [id number]  Score  6   Score last updated 2/0/22 29:52 AM    Click here for a link to the UpToDate guideline \"Atrial Fibrillation: Anticoagulation therapy to prevent embolization    Disclaimer: Risk Score calculation is dependent on accuracy of patient problem list and past encounter diagnosis.

## 2019-04-12 ENCOUNTER — TELEPHONE (OUTPATIENT)
Dept: CARDIOLOGY CLINIC | Age: 84
End: 2019-04-12

## 2019-04-22 ENCOUNTER — TELEPHONE (OUTPATIENT)
Dept: CARDIOLOGY CLINIC | Age: 84
End: 2019-04-22

## 2019-04-22 NOTE — TELEPHONE ENCOUNTER
POA called stated she spoke with Dr Joey Mann yesterday and he indicated that he wanted some testing done , Not sure what he ordered please call , POA stated she had anTIA a few weeks ago , stated he fingers were gray

## 2019-04-26 ENCOUNTER — TELEPHONE (OUTPATIENT)
Dept: CARDIOLOGY CLINIC | Age: 84
End: 2019-04-26

## 2019-04-26 NOTE — TELEPHONE ENCOUNTER
POA called asking if we can prescribe a inhaler for her SOB , She stated Dr Rober Kennedy office is closed on Friday's

## 2019-04-26 NOTE — TELEPHONE ENCOUNTER
Called pts POA back and told her we do not want to prescribe her an inhaler without even seeing her, POA stated that is fine, pt is about ready to go on Hospice care.

## 2019-04-29 ENCOUNTER — TELEPHONE (OUTPATIENT)
Dept: FAMILY MEDICINE CLINIC | Age: 84
End: 2019-04-29

## 2019-04-29 NOTE — TELEPHONE ENCOUNTER
Alpesh Jacobson came in asking if you though an inhaler would help patient for shortness of breath? States sometimes at night she will get real short of breath and wonders if inhaler would be beneficial and if you would prescribe one?

## 2019-05-13 ENCOUNTER — OFFICE VISIT (OUTPATIENT)
Dept: CARDIOLOGY CLINIC | Age: 84
End: 2019-05-13
Payer: COMMERCIAL

## 2019-05-13 VITALS
WEIGHT: 106.6 LBS | SYSTOLIC BLOOD PRESSURE: 104 MMHG | DIASTOLIC BLOOD PRESSURE: 60 MMHG | HEART RATE: 60 BPM | BODY MASS INDEX: 20.93 KG/M2 | HEIGHT: 60 IN

## 2019-05-13 DIAGNOSIS — Z95.0 CARDIAC PACEMAKER IN SITU: ICD-10-CM

## 2019-05-13 DIAGNOSIS — I48.0 PAF (PAROXYSMAL ATRIAL FIBRILLATION) (HCC): Primary | ICD-10-CM

## 2019-05-13 PROCEDURE — 99214 OFFICE O/P EST MOD 30 MIN: CPT | Performed by: INTERNAL MEDICINE

## 2019-05-13 PROCEDURE — 93280 PM DEVICE PROGR EVAL DUAL: CPT | Performed by: INTERNAL MEDICINE

## 2019-05-13 NOTE — PROGRESS NOTES
Savita Barfield MD        OFFICE  FOLLOWUP NOTE    Chief complaints: patient is here for management of CHF, PPM,AFIB, DIZZINESS, EPISTAXIS, possible subarachnoid hemorrhage or cortical petechial hemorrhage, recurrent TIA      Subjective: MAY HAVE AN OTHER TIA    HPI Ariadna Bolton is a 80 y. o.year old who  has a past medical history of Atrial fibrillation and flutter ([facility] [address] CHF (congestive heart failure) ([facility] [address] Diverticulosis, Enlarged thyroid, GERD (gastroesophageal reflux disease), H/O echocardiogram, H/O subconjunctival hemorrhage, History of CT scan of head, History of echocardiogram, IBS (irritable bowel syndrome), Mitral regurgitation, Osteoarthritis, Osteoporosis, Pacemaker, Palpitations, and Pedal edema. and presents for management of CHF, PPM,AFIB, DIZZINESS, EPISTAXIS, possible subarachnoid hemorrhage or cortical petechial hemorrhage which are well controlled, THIS IN HER 2ND EPISODE OF TIA, HER LAST TIA and was treated conservatively without MRI, she had history of ICH in past on anticoagulation, she was not seen by neurologist, HER LASIX WAS STOPPED          Current Outpatient Medications   Medication Sig Dispense Refill    furosemide (LASIX) 20 MG tablet Take 20 mg by mouth daily      methimazole (TAPAZOLE) 5 MG tablet Take 1 tablet by mouth daily 1 tablet 0    digoxin (LANOXIN) 125 MCG tablet Take 1 tablet by mouth daily 30 tablet 3    Multiple Vitamin (MULTI-VITAMIN DAILY PO) Take 1 tablet by mouth daily       No current facility-administered medications for this visit. Allergies: Dairycare [lactase-lactobacillus];  Lactose; and Milk-related compounds  Past Medical History:   Diagnosis Date    Atrial fibrillation and flutter (HCC)     CHF (congestive heart failure) (HCC)     Diverticulosis     Enlarged thyroid     GERD (gastroesophageal reflux disease)     H/O echocardiogram 05/08/2017    EF 60%     H/O subconjunctival hemorrhage     History of CT scan of head 07/09/2018 No acute intracranial abnormality. Diffuse atrophic changes with findings suggesting chronic microvascular ischemia.  History of echocardiogram 09/26/2016    EF60%-moderate AS, moderate MR, MILD AI, enlarged LA, MODERATE TR    IBS (irritable bowel syndrome)     Mitral regurgitation     moderate severity; Tricuspid Regurgitation: mild severity    Osteoarthritis     primarily affecting the neck, fingers and knees    Osteoporosis     Pacemaker     Palpitations     Pedal edema      Past Surgical History:   Procedure Laterality Date    APPENDECTOMY  18's    with choley    BLADDER SURGERY      per old chart had bladder bx and fulgeration done 7/2007    BREAST BIOPSY  1965    left breast - negative for cancer    CARDIOVASCULAR STRESS TEST  11/2007    treadmill    CATARACT REMOVAL  12/2008    Left    CHOLECYSTECTOMY  1980's    with appy    [facility]    INGUINAL HERNIA REPAIR  08-20-12    Right Side     PACEMAKER INSERTION  03/14/2014    L upper chest// per old chart had 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    MT SONO GUIDE NEEDLE BIOPSY  12/2018    TONSILLECTOMY AND ADENOIDECTOMY  1941    VARICOSE VEIN SURGERY  1960's per old chart    stripping bilat legs     Family History   Problem Relation Age of Onset    High Blood Pressure Sister     Other Sister         sinus problems, vericose veins    Other Father         GI probloems - hx obstruction    Heart Disease Sister         CHF    Diabetes Sister         s/p partial pancreas removal    Early Death Daughter 40        unknown cause    Asthma Son     Cancer Brother         leukemia    Heart Disease Brother         CABG    Early Death Brother         Murdered    Other Brother         dies at 80. unknown cause.     Cancer Brother         throat    Early Death Brother 5        pneumonia     Social History     Tobacco Use    Smoking status: Never Smoker    Smokeless tobacco: Never Used   Substance Use Topics    Alcohol use: No      [unfilled]  Review of Systems:   · Constitutional: No Fever or Weight Loss   · Eyes: No Decreased Vision  · ENT: No Headaches, Hearing Loss or Vertigo  · Cardiovascular: No chest pain, dyspnea on exertion, palpitations or loss of consciousness  · Respiratory: No cough or wheezing    · Gastrointestinal: No abdominal pain, appetite loss, blood in stools, constipation, diarrhea or heartburn  · Genitourinary: No dysuria, trouble voiding, or hematuria  · Musculoskeletal:  No gait disturbance, weakness or joint complaints  · Integumentary: No rash or pruritis  · Neurological: No TIA or stroke symptoms  · Psychiatric: No anxiety or depression  · Endocrine: No malaise, fatigue or temperature intolerance  · Hematologic/Lymphatic: No bleeding problems, blood clots or swollen lymph nodes  · Allergic/Immunologic: No nasal congestion or hives  All systems negative except as marked. Objective:  /60 (Site: Right Upper Arm, Position: Sitting, Cuff Size: Medium Adult)   Pulse 60   Ht 5' (1.524 m)   Wt 106 lb 9.6 oz (48.4 kg)   LMP  (LMP Unknown)   BMI 20.82 kg/m²   Wt Readings from Last 3 Encounters:   05/13/19 106 lb 9.6 oz (48.4 kg)   04/08/19 108 lb 6.4 oz (49.2 kg)   04/03/19 108 lb 12.8 oz (49.4 kg)     Body mass index is 20.82 kg/m². GENERAL - Alert, oriented, pleasant, in no apparent distress,normal grooming  HEENT - pupils are reactive to light and accomodation, cornea intact, conjunctive normal color, ears are normal in exam,throat exam in normal, teeth, gum and palate are normal, oral mucosa is normal without any notation of pallor or cyanosis  Neck - Supple. No jugular venous distention noted. No carotid bruits, no apical -carotid delay  Respiratory:  Normal breath sounds, No respiratory distress, No wheezing, No chest tenderness. ,no use of accessory muscles, diaphragm movement is normal  Cardiovascular: (PMI) apex non displaced,no lifts no thrills, no s3,no s4, Normal heart rate, Normal rhythm, No murmurs, No rubs, No gallops. Carotid arteries pulse and amplitude are normal no bruit, no abdominal bruit noted ( normal abdominal aorta ausculation), femoral arteries pulse and amplitude are normal no bruit, pedal pulses are normal  Femoral pulses have normal amplitude, no bruits   Extremities - No cyanosis, clubbing, or significant edema, no varicose veins    Abdomen - No masses, tenderness, or organomegaly, no hepato-splenomegally, no bruits  Musculoskeletal - No significant edema, no kyphosis or scoliosis, no deformity in any extremity noted, muscle strength and tone are normal  Skin: no ulcer,no scar,no stasis dermatitis   Neurologic - alert oriented times 3,Cranial nerves II through XII are grossly intact. There were no gross focal neurologic abnormalities. All sensory and motor nerves examined and were normal  Psychiatric: normal mood and affect    Lab Results   Component Value Date    CKTOTAL 80 03/28/2019    TROPONINI 0.250 04/23/2014     BNP:    Lab Results   Component Value Date    BNP 1,086 04/22/2014     PT/INR:  No results found for: PTINR  No results found for: LABA1C  Lab Results   Component Value Date    CHOL 147 03/29/2019    TRIG 54 03/29/2019    HDL 60 03/29/2019    LDLCALC 114 (H) 05/12/2012    LDLDIRECT 97 03/29/2019     Lab Results   Component Value Date    ALT 12 03/28/2019    AST 24 03/28/2019     TSH:    Lab Results   Component Value Date    TSH 0.06 11/06/2018   PACER/ICD  INTERROGATION      LIFE/VOLTAGE:ADEQUATE  A FIB:No  ATRIAL PACING:YES  VENTRICULAR PACING:YES  SHOCKS:No  ALL lead thresholds, impedence and functions are normal  Recommend to continue routine evaluation        Impression:  Ashia Liao is a 80 y. o.year old who  has a past medical history of Atrial fibrillation and flutter ([facility] [address] CHF (congestive heart failure) ([facility] [address] Diverticulosis, Enlarged thyroid, GERD (gastroesophageal reflux disease), H/O echocardiogram, H/O subconjunctival hemorrhage, History of CT scan of head, History of echocardiogram, IBS (irritable bowel syndrome), Mitral regurgitation, Osteoarthritis, Osteoporosis, Pacemaker, Palpitations, and Pedal edema. and presents with     Plan:  1. ? Recurrent TIA: discharged from hospital, she is not on antocoagulation or antiplatelets, will refer to neurologist to decide for possible aspirin use, her pacer is MRI safe  2. LASIX TO CONTINUE  3. PAF: controlled, she has over active thyroid and just started methimazole,OFF midodrine , off eliquis because of subconjuctival hemorrhage  4. Tachycardia:resolved, off  midodrione  5. Epistaxis:resolved  6. PPM: normal functioning, check as per schedule  7. CHF and aortic stenosis: MILD AS LAST YR, will repeat ECHO NEXT        8. Health maintenance: exerise and diet  All labs, medications and tests reviewed, continue all other medications of all above medical condition listed as is.

## 2019-05-24 ENCOUNTER — APPOINTMENT (OUTPATIENT)
Dept: CT IMAGING | Age: 84
End: 2019-05-24
Payer: COMMERCIAL

## 2019-05-24 ENCOUNTER — APPOINTMENT (OUTPATIENT)
Dept: GENERAL RADIOLOGY | Age: 84
End: 2019-05-24
Payer: COMMERCIAL

## 2019-05-24 ENCOUNTER — HOSPITAL ENCOUNTER (EMERGENCY)
Age: 84
Discharge: HOME | End: 2019-05-24
Attending: EMERGENCY MEDICINE
Payer: COMMERCIAL

## 2019-05-24 VITALS
WEIGHT: 106.6 LBS | BODY MASS INDEX: 20.82 KG/M2 | RESPIRATION RATE: 19 BRPM | SYSTOLIC BLOOD PRESSURE: 131 MMHG | DIASTOLIC BLOOD PRESSURE: 60 MMHG | TEMPERATURE: 98.7 F | OXYGEN SATURATION: 99 % | HEART RATE: 80 BPM

## 2019-05-24 DIAGNOSIS — R53.1 LEFT-SIDED WEAKNESS: Primary | ICD-10-CM

## 2019-05-24 DIAGNOSIS — E16.2 HYPOGLYCEMIA: ICD-10-CM

## 2019-05-24 LAB
ALBUMIN SERPL-MCNC: 3.6 GM/DL (ref 3.4–5)
ALP BLD-CCNC: 69 IU/L (ref 40–129)
ALT SERPL-CCNC: 14 U/L (ref 10–40)
ANION GAP SERPL CALCULATED.3IONS-SCNC: 10 MMOL/L (ref 4–16)
APTT: 29.9 SECONDS (ref 21.2–33)
AST SERPL-CCNC: 25 IU/L (ref 15–37)
BACTERIA: ABNORMAL /HPF
BASOPHILS ABSOLUTE: 0 K/CU MM
BASOPHILS RELATIVE PERCENT: 0.1 % (ref 0–1)
BILIRUB SERPL-MCNC: 0.4 MG/DL (ref 0–1)
BILIRUBIN URINE: NEGATIVE MG/DL
BLOOD, URINE: ABNORMAL
BUN BLDV-MCNC: 24 MG/DL (ref 6–23)
CALCIUM SERPL-MCNC: 8.9 MG/DL (ref 8.3–10.6)
CHLORIDE BLD-SCNC: 99 MMOL/L (ref 99–110)
CHP ED QC CHECK: YES
CLARITY: CLEAR
CO2: 24 MMOL/L (ref 21–32)
COLOR: ABNORMAL
CREAT SERPL-MCNC: 0.7 MG/DL (ref 0.6–1.1)
DIFFERENTIAL TYPE: ABNORMAL
EOSINOPHILS ABSOLUTE: 0 K/CU MM
EOSINOPHILS RELATIVE PERCENT: 0 % (ref 0–3)
GFR AFRICAN AMERICAN: >60 ML/MIN/1.73M2
GFR NON-AFRICAN AMERICAN: >60 ML/MIN/1.73M2
GLUCOSE BLD-MCNC: 55 MG/DL
GLUCOSE BLD-MCNC: 55 MG/DL (ref 70–99)
GLUCOSE BLD-MCNC: 77 MG/DL (ref 70–99)
GLUCOSE BLD-MCNC: 93 MG/DL (ref 70–99)
GLUCOSE, URINE: NEGATIVE MG/DL
HCT VFR BLD CALC: 44.7 % (ref 37–47)
HEMOGLOBIN: 12.5 GM/DL (ref 12.5–16)
IMMATURE NEUTROPHIL %: 0.4 % (ref 0–0.43)
INR BLD: 1.03 INDEX
KETONES, URINE: NEGATIVE MG/DL
LEUKOCYTE ESTERASE, URINE: ABNORMAL
LYMPHOCYTES ABSOLUTE: 0.7 K/CU MM
LYMPHOCYTES RELATIVE PERCENT: 4.3 % (ref 24–44)
MCH RBC QN AUTO: 28.4 PG (ref 27–31)
MCHC RBC AUTO-ENTMCNC: 28 % (ref 32–36)
MCV RBC AUTO: 101.6 FL (ref 78–100)
MONOCYTES ABSOLUTE: 0.8 K/CU MM
MONOCYTES RELATIVE PERCENT: 4.8 % (ref 0–4)
MUCUS: ABNORMAL HPF
NITRITE URINE, QUANTITATIVE: NEGATIVE
NUCLEATED RBC %: 0 %
PDW BLD-RTO: 17.7 % (ref 11.7–14.9)
PH, URINE: 7 (ref 5–8)
PLATELET # BLD: 201 K/CU MM (ref 140–440)
PMV BLD AUTO: 10.4 FL (ref 7.5–11.1)
POTASSIUM SERPL-SCNC: 5.3 MMOL/L (ref 3.5–5.1)
PROTEIN UA: NEGATIVE MG/DL
PROTHROMBIN TIME: 11.7 SECONDS (ref 9.12–12.5)
RBC # BLD: 4.4 M/CU MM (ref 4.2–5.4)
RBC URINE: 1 /HPF (ref 0–6)
REASON FOR REJECTION: NORMAL
REJECTED TEST: NORMAL
REJECTED TEST: NORMAL
SEGMENTED NEUTROPHILS ABSOLUTE COUNT: 15 K/CU MM
SEGMENTED NEUTROPHILS RELATIVE PERCENT: 90.4 % (ref 36–66)
SODIUM BLD-SCNC: 133 MMOL/L (ref 135–145)
SPECIFIC GRAVITY UA: 1 (ref 1–1.03)
SQUAMOUS EPITHELIAL: <1 /HPF
TOTAL IMMATURE NEUTOROPHIL: 0.07 K/CU MM
TOTAL NUCLEATED RBC: 0 K/CU MM
TOTAL PROTEIN: 7.4 GM/DL (ref 6.4–8.2)
TRICHOMONAS: ABNORMAL /HPF
TROPONIN T: <0.01 NG/ML
UROBILINOGEN, URINE: NORMAL MG/DL (ref 0.2–1)
WBC # BLD: 16.6 K/CU MM (ref 4–10.5)
WBC UA: 1 /HPF (ref 0–5)

## 2019-05-24 PROCEDURE — 93005 ELECTROCARDIOGRAM TRACING: CPT | Performed by: EMERGENCY MEDICINE

## 2019-05-24 PROCEDURE — 2580000003 HC RX 258: Performed by: EMERGENCY MEDICINE

## 2019-05-24 PROCEDURE — 85025 COMPLETE CBC W/AUTO DIFF WBC: CPT

## 2019-05-24 PROCEDURE — 99285 EMERGENCY DEPT VISIT HI MDM: CPT

## 2019-05-24 PROCEDURE — 80053 COMPREHEN METABOLIC PANEL: CPT

## 2019-05-24 PROCEDURE — 84484 ASSAY OF TROPONIN QUANT: CPT

## 2019-05-24 PROCEDURE — 82962 GLUCOSE BLOOD TEST: CPT

## 2019-05-24 PROCEDURE — 71045 X-RAY EXAM CHEST 1 VIEW: CPT

## 2019-05-24 PROCEDURE — 81001 URINALYSIS AUTO W/SCOPE: CPT

## 2019-05-24 PROCEDURE — 70450 CT HEAD/BRAIN W/O DYE: CPT

## 2019-05-24 PROCEDURE — 85610 PROTHROMBIN TIME: CPT

## 2019-05-24 PROCEDURE — 85730 THROMBOPLASTIN TIME PARTIAL: CPT

## 2019-05-24 PROCEDURE — 36415 COLL VENOUS BLD VENIPUNCTURE: CPT

## 2019-05-24 PROCEDURE — 93010 ELECTROCARDIOGRAM REPORT: CPT | Performed by: INTERNAL MEDICINE

## 2019-05-24 RX ORDER — DEXTROSE MONOHYDRATE 25 G/50ML
INJECTION, SOLUTION INTRAVENOUS
Status: DISCONTINUED
Start: 2019-05-24 | End: 2019-05-25 | Stop reason: HOSPADM

## 2019-05-24 RX ORDER — DEXTROSE MONOHYDRATE 25 G/50ML
25 INJECTION, SOLUTION INTRAVENOUS ONCE
Status: COMPLETED | OUTPATIENT
Start: 2019-05-24 | End: 2019-05-24

## 2019-05-24 RX ADMIN — DEXTROSE 50 % IN WATER (D50W) INTRAVENOUS SYRINGE 25 G: at 15:21

## 2019-05-24 NOTE — ED NOTES
Per EMS left sided weakness, left facial droop, LKW [id number].  Stroke alert called upon arrival.     Jose Alba RN  05/24/19 1131

## 2019-05-24 NOTE — ED PROVIDER NOTES
Triage Chief Complaint:   No chief complaint on file. [place]:  Paul Ross is a 80 y.o. female that presents with left sided weakness and some difficulty with speech. Patient's last known well is reported as 1200, per EMS. Per EMS staff at patient's nursing home. The patient on a walk at noon today and she was at her baseline. They came back to walk the patient again at 2 PM and noted that she was not using her left arm. Additionally, patient with a change in her speech. Patient with a history of stroke as well as intracranial hemorrhage. Patient with a history of atrial fibrillation is not on any anticoagulation per chart review. Patient is minimally verbal on arrival with some difficulty with speech limiting history. ROS:  At least 10 systems reviewed and otherwise acutely negative except as in the [address] Past Medical History:   Diagnosis Date    Atrial fibrillation and flutter (HCC)     CHF (congestive heart failure) (HCC)     Diverticulosis     Enlarged thyroid     GERD (gastroesophageal reflux disease)     H/O echocardiogram 05/08/2017    EF 60%     H/O subconjunctival hemorrhage     History of CT scan of head 07/09/2018    No acute intracranial abnormality. Diffuse atrophic changes with findings suggesting chronic microvascular ischemia.     History of echocardiogram 09/26/2016    EF60%-moderate AS, moderate MR, MILD AI, enlarged LA, MODERATE TR    IBS (irritable bowel syndrome)     Mitral regurgitation     moderate severity; Tricuspid Regurgitation: mild severity    Osteoarthritis     primarily affecting the neck, fingers and knees    Osteoporosis     Pacemaker     Palpitations     Pedal edema      Past Surgical History:   Procedure Laterality Date    APPENDECTOMY  18's    with choley    BLADDER SURGERY      per old chart had bladder bx and fulgeration done 7/2007    BREAST BIOPSY  1965    left breast - negative for cancer    CARDIOVASCULAR STRESS TEST  11/2007    treadmill  CATARACT REMOVAL  12/2008    Left    CHOLECYSTECTOMY  1980's    with appy    COLONOSCOPY  2010    HERNIA REPAIR  1986    left inguinal    INGUINAL HERNIA REPAIR  08-20-12    Right Side     PACEMAKER INSERTION  03/14/2014    L upper chest// per old chart had 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    CT SONO GUIDE NEEDLE BIOPSY  12/2018    TONSILLECTOMY AND ADENOIDECTOMY  1941    VARICOSE VEIN SURGERY  26's per old chart    stripping bilat legs     Family History   Problem Relation Age of Onset    High Blood Pressure Sister     Other Sister         sinus problems, vericose veins    Other Father         GI probloems - hx obstruction    Heart Disease Sister         CHF    Diabetes Sister         s/p partial pancreas removal    Early Death Daughter 40        unknown cause    Asthma Son     Cancer Brother         leukemia    Heart Disease Brother         CABG    Early Death Brother         Murdered    Other Brother         dies at 80. unknown cause.  Cancer Brother         throat    Early Death Brother 5        pneumonia     Social History     Socioeconomic History    Marital status:       Spouse name: Not on file    Number of children: Not on file    Years of education: Not on file    Highest education level: Not on file   Occupational History    Not on file   Social Needs    Financial resource strain: Not on file    Food insecurity:     Worry: Not on file     Inability: Not on file    Transportation needs:     Medical: Not on file     Non-medical: Not on file   Tobacco Use    Smoking status: Never Smoker    Smokeless tobacco: Never Used   Substance and Sexual Activity    Alcohol use: No    Drug use: No    Sexual activity: Never   Lifestyle    Physical activity:     Days per week: Not on file     Minutes per session: Not on file    Stress: Not on file   Relationships    Social connections:     Talks on phone: Not on file     Gets together: Not on file     Attends Religion service: Not on file     Active member of club or organization: Not on file     Attends meetings of clubs or organizations: Not on file     Relationship status: Not on file    Intimate partner violence:     Fear of current or ex partner: Not on file     Emotionally abused: Not on file     Physically abused: Not on file     Forced sexual activity: Not on file   Other Topics Concern    Not on file   Social History Narrative    Not on file     No current facility-administered medications for this encounter. Current Outpatient Medications   Medication Sig Dispense Refill    furosemide (LASIX) 20 MG tablet Take 20 mg by mouth daily      methimazole (TAPAZOLE) 5 MG tablet Take 1 tablet by mouth daily 1 tablet 0    digoxin (LANOXIN) 125 MCG tablet Take 1 tablet by mouth daily 30 tablet 3    Multiple Vitamin (MULTI-VITAMIN DAILY PO) Take 1 tablet by mouth daily       Allergies   Allergen Reactions    Dairycare [Lactase-Lactobacillus]     Lactose     Milk-Related Compounds        Nursing Notes Reviewed    Physical Exam:  ED Triage Vitals   Enc Vitals Group      BP       Pulse       Resp       Temp       Temp src       SpO2       Weight       Height       Head Circumference       Peak Flow       Pain Score       Pain Loc       Pain Edu? Excl. in [address]? GENERAL APPEARANCE: Awake and alert. Cooperative. Appears younger than stated age. HEAD: Normocephalic. Atraumatic. EYES: EOM's grossly intact. Sclera anicteric. ENT: Mucous membranes are moist. Tolerates saliva. No trismus. NECK: Supple. No meningismus. Trachea midline. HEART: Irregular rhythm but regular rate. Radial pulses 2+. LUNGS: Respirations unlabored. CTAB. ABDOMEN: Soft. Non-tender. No guarding or rebound. EXTREMITIES: No acute deformities. SKIN: Warm and dry.   NEUROLOGICAL:   Uzma Anderson's NIH Stroke Scale at 6:57 PM is:  Level of Consciousness:  0 - alert; keenly responsive    LOC Questions:  0 - 4.8 (H) 0 - 4 %    Eosinophils % 0.0 0 - 3 %    Basophils % 0.1 0 - 1 %    Segs Absolute 15.0 K/CU MM    Lymphocytes # 0.7 K/CU MM    Monocytes # 0.8 K/CU MM    Eosinophils # 0.0 K/CU MM    Basophils # 0.0 K/CU MM    Nucleated RBC % 0.0 %    Total Nucleated RBC 0.0 K/CU MM    Total Immature Neutrophil 0.07 K/CU MM    Immature Neutrophil % 0.4 0 - 0.43 %   CMP   Result Value Ref Range    Sodium 133 (L) 135 - 145 MMOL/L    Potassium 5.3 (H) 3.5 - 5.1 MMOL/L    Chloride 99 99 - 110 mMol/L    CO2 24 21 - 32 MMOL/L    BUN 24 (H) 6 - 23 MG/DL    CREATININE 0.7 0.6 - 1.1 MG/DL    Glucose 93 70 - 99 MG/DL    Calcium 8.9 8.3 - 10.6 MG/DL    Alb 3.6 3.4 - 5.0 GM/DL    Total Protein 7.4 6.4 - 8.2 GM/DL    Total Bilirubin 0.4 0.0 - 1.0 MG/DL    ALT 14 10 - 40 U/L    AST 25 15 - 37 IU/L    Alkaline Phosphatase 69 40 - 129 IU/L    GFR Non-African American >60 >60 mL/min/1.73m2    GFR African American >60 >60 mL/min/1.73m2    Anion Gap 10 4 - 16   Troponin   Result Value Ref Range    Troponin T <0.010 <0.01 NG/ML   SPECIMEN REJECTION   Result Value Ref Range    Rejected Test PTPTT     Reason for Rejection UNABLE TO PERFORM TESTING:     Reason for Rejection NO SPECIMEN RECEIVED    SPECIMEN REJECTION   Result Value Ref Range    Rejected Test PTPTT     Reason for Rejection UNABLE TO PERFORM TESTING:     Reason for Rejection SPECIMEN QUANTITY NOT SUFFICIENT    Protime/INR & PTT   Result Value Ref Range    Protime 11.7 9.12 - 12.5 SECONDS    INR 1.03 INDEX    aPTT 29.9 21.2 - 33.0 SECONDS   Urinalysis   Result Value Ref Range    Color, UA STRAW (A) YELLOW    Clarity, UA CLEAR CLEAR    Glucose, Urine NEGATIVE NEGATIVE MG/DL    Bilirubin Urine NEGATIVE NEGATIVE MG/DL    Ketones, Urine NEGATIVE NEGATIVE MG/DL    Specific Gravity, UA 1.003 1.001 - 1.035    Blood, Urine SMALL (A) NEGATIVE    pH, Urine 7.0 5.0 - 8.0    Protein, UA NEGATIVE NEGATIVE MG/DL    Urobilinogen, Urine NORMAL 0.2 - 1.0 MG/DL    Nitrite Urine, Quantitative NEGATIVE NEGATIVE    Leukocyte Esterase, Urine MODERATE (A) NEGATIVE    RBC, UA 1 0 - 6 /HPF    WBC, UA 1 0 - 5 /HPF    Bacteria, UA RARE (A) NEGATIVE /HPF    Squam Epithel, UA <1 /HPF    Mucus, UA RARE (A) NEGATIVE HPF    Trichomonas, UA NONE SEEN NONE SEEN /HPF   POCT Glucose   Result Value Ref Range    Glucose 55 mg/dL    QC OK? yes    POCT Glucose   Result Value Ref Range    POC Glucose 55 (L) 70 - 99 MG/DL   POCT Glucose   Result Value Ref Range    POC Glucose 77 70 - 99 MG/DL   EKG 12 Lead   Result Value Ref Range    Ventricular Rate 83 BPM    Atrial Rate 117 BPM    QRS Duration 102 ms    Q-T Interval 338 ms    QTc Calculation (Bazett) 397 ms    R Axis -11 degrees    T Axis -63 degrees    Diagnosis       ** Suspect unspecified pacemaker failure  Atrial fibrillation  Incomplete right bundle branch block  Minimal voltage criteria for LVH, may be normal variant  ST & T wave abnormality, consider anterior ischemia  Abnormal ECG  When compared with ECG of 28-MAR-2019 15:00,  No significant change was found          Radiographs (if obtained):  [] The following radiograph was interpreted by myself in the absence of a radiologist:  [x] Radiologist's Report Reviewed:  Ct Head Wo Contrast    Result Date: 5/24/2019  EXAMINATION: CT OF THE HEAD WITHOUT CONTRAST  5/24/2019 3:06 pm TECHNIQUE: CT of the head was performed without the administration of intravenous contrast. Dose modulation, iterative reconstruction, and/or weight based adjustment of the mA/kV was utilized to reduce the radiation dose to as low as reasonably achievable. COMPARISON: 03/28/2019 HISTORY: ORDERING SYSTEM PROVIDED HISTORY: stroke alert TECHNOLOGIST PROVIDED HISTORY: Has a \"code stroke\" or \"stroke alert\" been called? ->Yes Ordering Physician Provided Reason for Exam: stroke alert, left side weakness Acuity: Acute Type of Exam: Initial Additional signs and symptoms: none Relevant Medical/Surgical History: unknown FINDINGS: BRAIN/VENTRICLES: There is no acute intracranial hemorrhage, mass effect or midline shift. No abnormal extra-axial fluid collection. The gray-white differentiation is maintained without evidence of an acute infarct. There is no evidence of hydrocephalus. Moderate periventricular and subcortical white matter low attenuation is identified, similar to the previous examination. No wedge-shaped area of acute ischemia is identified. No intracranial mass is identified. No basilar cistern or sulcal effacement. Atherosclerotic calcifications are identified. ORBITS: The visualized portion of the orbits demonstrate no acute abnormality. SINUSES: The visualized paranasal sinuses and mastoid air cells demonstrate no acute abnormality. SOFT TISSUES/SKULL:  No acute abnormality of the visualized skull or soft tissues. No acute ischemia or intracranial hemorrhage is identified. Moderate periventricular and subcortical white matter low attenuation felt likely related to chronic microvascular ischemic change. Similar CT scan of the head compared to the previous study. These findings were called to and discussed with Dr. Rustam Sorto in the emergency department at 3:15 p.m. on 05/24/2019. Xr Chest Portable    Result Date: 5/24/2019  EXAMINATION: ONE XRAY VIEW OF THE CHEST 5/24/2019 3:50 pm COMPARISON: 09/22/2018 HISTORY: ORDERING SYSTEM PROVIDED HISTORY: left sided weakness TECHNOLOGIST PROVIDED HISTORY: Reason for exam:->left sided weakness Ordering Physician Provided Reason for Exam: ams Initial exam FINDINGS: Pacemaker device in satisfactory position. No consolidation, effusion or pneumothorax. Stable cardiomediastinal silhouette. Cardiomegaly without acute process. EKG (if obtained): (All EKG's are interpreted by myself in the absence of a cardiologist)  12 lead EKG per my interpretation:  Atrial Fibrillation at 83  Axis is   Normal  QTc is  within an acceptable range  There is specific T wave changes appreciated.   There is no specific ST wave changes appreciated. No STEMI    Prior EKG to compare with was available and T-wave inversions      MDM:  Pt presents as above. Emergent conditions considered. Presentation prompted initial immediate evaluation. Code stroke was called on patient arrival. Patient quickly/directly to CT. CT head negative for acute process. Point-of-care glucose on arrival with hypoglycemia. Amp of D50 was given. CBC with leukocytosis. CMP without clinically significant derangement other than mild hyperkalemia and mild hyponatremia. Coags without significant coagulopathy. Troponin negative. Point-of-care glucose with euglycemia following dextrose administration. Chest x-ray negative for acute process. Patient was discussed with [facility] stroke neurology attending, Dr. Robel Galvez, who given patient's history of intracranial hemorrhage, Comfort Care status, age and hypoglycemia with improving symptoms after normalization of blood sugar is not recommending tPA at this time. Per chart review and per caregiver at bedside patient with history of very similar presentation a few months ago with decision made to hold on any anticoagulation. Caregiver at this time given the risks of further bleeding issues and patient's advanced age as well as her history does not want patient on anticoagulation. Patient observed in the emergency department with continued improved weakness. I do believe episode today was secondary to hypoglycemic event. Patient may have had a TIA. Given the fact that patient is not a candidate/is refusing anticoagulation at this time I do not see any benefit of keeping the patient in the hospital for further TIA workup. Caregiver is well aware of patient's stroke risk which was again detailed at length and the possibility of having a potentially life altering stroke at any time and is still comfortable with home going. Transportation to be arranged to be transported home.     Questions sought and answered with the patient and caregiver. They voice understanding and agree with plan. 1.  Physician evaluation performed at: On arrival  2. Stroke alert called at: On arrival  3. CT results obtained at:  7188  9. Decision was made that TPA is NOT indicated in consultation with [facility] Stroke Neurologist, Dr. Vik Almodovar. t-PA NOT given due to the following EXCLUSION CRITERIA:  [] Administration of t-PA can not be initiated within 4.5 hours of onset of symptoms  [] Evidence of intracranial hemorrhage on pretreatment CT  [] Clinical presentation suggestive of subarachnoid hemorrhage (even with normal CT)  [] CT shows multilobar infarction (hypodensity of > 1/3 cerebral hemisphere)  [] Known intracranial neoplasm, arteriovenous malformation or aneurysm  [] Significant head trauma (with sustained loss of consciousness), intracranial, or  intraspinal surgery within past 3 months  [] *Blood pressure elevated (systolic > 495 mm Hg or diastolic > 449 mm Hg)   [] *Abnormal blood glucose (< 50 or > 400mg/dL)  [] Active internal bleeding  [] Known bleeding risk (including but not limited to below)   Heparin, argatroban, or bivalirudin received within 48 hours with     PTT > upper limit of normal   Platelet count < 036,831/TP7   Current or recent use of anticoagulants including but not limited to: Warfarin (Coumadin®) within 5 days or INR > 1.7     Apixiban (Eliquis®) within 48 hours**     Dabigatran (Pradaxa®) within 72 hours**     Enoxaparin (Lovenox®) within 24 hours**     Fondaparinux (Arixtra®) within 72 hours**     Rivaroxaban (Xarelto®) within 24 hours**     **For patients with normal renal function.  Activity may be significantly   prolonged in the elderly or patients with renal dysfunction    *Remains thrombolytic eligible if BP/BG corrected while in treatment window    t-PA NOT given due to consideration of the following RELATIVE CONTRAINDICATIONS:  [] Prior ischemic stroke within last 3 months  [x] Recent history of intracranial hemorrhage  [] Pregnancy  [] Current or recent use of: Prasugrel (Effient®) within last 7 days or Ticagrelor (Brilinta®) within last 5 days  [] Major surgery or serious trauma within last 14 days  [] Arterial puncture at non-compressible site or lumbar puncture within last 7 days    Note: Patient undergoing LP within last 24 hours may be at higher risk. Assess  for signs of traumatic or repeated punctures. [] Gastrointestinal or urinary tract hemorrhage within last 21 days  [] Myocardial infarction involving left anterior myocardium within last 3 months  [] Suspected or known infective endocarditis or pericarditis    t-PA NOT given due to consideration of the following RELATIVE CONTRAINDICATIONS in those patients with last known well within 3-4.5 hours:  []Taking an oral anticoagulant (apixaban, dabigatran, edoxaban, rivaroxaban) other than warfarin regardless of time since last ose. Note: This does not include warfarn. A patient on Eddye Cm remains eligible if INR less than or equal to 1.7    5. Patient will be dispositioned to [facility]      Clinical Impression:  1. Left-sided weakness    2.  Hypoglycemia      (Please note that portions of this note may have been completed with a voice recognition program. Efforts were made to edit the dictations but occasionally words are mis-transcribed.)    MD Donny Ortega MD  05/24/19 [id number]

## 2019-05-24 NOTE — ED NOTES
Bed: 04TR-04  Expected date:   Expected time:   Means of arrival:   Comments:  Medic stroke alert     Carl Galan RN  05/24/19 [id number]

## 2019-05-28 ENCOUNTER — TELEPHONE (OUTPATIENT)
Dept: FAMILY MEDICINE CLINIC | Age: 84
End: 2019-05-28

## 2019-05-28 NOTE — TELEPHONE ENCOUNTER
Assisted living called May 24 [phone]. Spoke to [facility]. Pateint with left side weakness and drooping of left face.   Plan: go to ER

## 2019-05-30 LAB
EKG ATRIAL RATE: 117 BPM
EKG DIAGNOSIS: NORMAL
EKG Q-T INTERVAL: 338 MS
EKG QRS DURATION: 102 MS
EKG QTC CALCULATION (BAZETT): 397 MS
EKG R AXIS: -11 DEGREES
EKG T AXIS: -63 DEGREES
EKG VENTRICULAR RATE: 83 BPM

## 2019-07-05 ENCOUNTER — TELEPHONE (OUTPATIENT)
Dept: CARDIOLOGY CLINIC | Age: 84
End: 2019-07-05

## 2019-07-05 ENCOUNTER — TELEPHONE (OUTPATIENT)
Dept: FAMILY MEDICINE CLINIC | Age: 84
End: 2019-07-05

## 2019-07-30 ENCOUNTER — CARE COORDINATION (OUTPATIENT)
Dept: CARE COORDINATION | Age: 84
End: 2019-07-30

## 2019-08-19 ENCOUNTER — OFFICE VISIT (OUTPATIENT)
Dept: FAMILY MEDICINE CLINIC | Age: 84
End: 2019-08-19
Payer: COMMERCIAL

## 2019-08-19 VITALS
HEART RATE: 94 BPM | TEMPERATURE: 98.3 F | DIASTOLIC BLOOD PRESSURE: 70 MMHG | SYSTOLIC BLOOD PRESSURE: 120 MMHG | BODY MASS INDEX: 20.08 KG/M2 | WEIGHT: 102.8 LBS

## 2019-08-19 DIAGNOSIS — R63.4 WEIGHT LOSS: ICD-10-CM

## 2019-08-19 DIAGNOSIS — R82.90 ABNORMAL URINE ODOR: Primary | ICD-10-CM

## 2019-08-19 DIAGNOSIS — R54 SENILE DEBILITY: ICD-10-CM

## 2019-08-19 LAB
BILIRUBIN, POC: NEGATIVE
BLOOD URINE, POC: NEGATIVE
CLARITY, POC: CLEAR
COLOR, POC: YELLOW
GLUCOSE URINE, POC: NEGATIVE
KETONES, POC: NEGATIVE
LEUKOCYTE EST, POC: NORMAL
NITRITE, POC: NEGATIVE
PH, POC: 7
PROTEIN, POC: NEGATIVE
SPECIFIC GRAVITY, POC: 1.01
UROBILINOGEN, POC: NORMAL

## 2019-08-19 PROCEDURE — 99214 OFFICE O/P EST MOD 30 MIN: CPT | Performed by: FAMILY MEDICINE

## 2019-08-19 PROCEDURE — 81002 URINALYSIS NONAUTO W/O SCOPE: CPT | Performed by: FAMILY MEDICINE

## 2019-08-19 NOTE — PROGRESS NOTES
Patient ID: Fina Form 12/15/1921    . Chief Complaint   Patient presents with    Dehydration    Epistaxis    Urinary Tract Infection     foul odor, lower back pan         HPI     Dehydration: Patient's friend thinks she might be dehydrated. Her urine has an odor. And she is not eating that much. Not drinking much, either per friend    Elderly: Patient in assisted living. Her friend stays with her for 14 hours a day. Patient is still not in the nursing home. Review of Systems   Constitutional: Positive for appetite change, fatigue and unexpected weight change. Negative for activity change. Cardiovascular: Negative for chest pain. Patient Active Problem List   Diagnosis    Pedal edema    Atrial fibrillation and flutter (HCC)    CHF (congestive heart failure) (HCC)    Mitral regurgitation    Spinal stenosis of lumbar region    Risk for falls    Thyroid nodule    Senile debility    Primary osteoarthritis of both hips    Primary osteoarthritis of right knee    Osteoarthritis of left knee    Osteoarthritis of lumbar spine    TIA (transient ischemic attack)    ICH (intracerebral hemorrhage) ([address]       Past Medical History:   Diagnosis Date    Atrial fibrillation and flutter ([address]     CHF (congestive heart failure) ([address]     Diverticulosis     Enlarged thyroid     GERD (gastroesophageal reflux disease)     H/O echocardiogram 05/08/2017    EF 60%     H/O subconjunctival hemorrhage     History of CT scan of head 07/09/2018    No acute intracranial abnormality. Diffuse atrophic changes with findings suggesting chronic microvascular ischemia.     History of echocardiogram 09/26/2016    EF60%-moderate AS, moderate MR, MILD AI, enlarged LA, MODERATE TR    IBS (irritable bowel syndrome)     Mitral regurgitation     moderate severity; Tricuspid Regurgitation: mild severity    Osteoarthritis     primarily affecting the neck, fingers and knees    Osteoporosis     Pacemaker     Constitutional: She appears well-developed and well-nourished. Sitting in wheelchair comfortably. HENT:   Head: Normocephalic and atraumatic. Mouth/Throat: Oropharynx is clear and moist and mucous membranes are normal.   Neck: Neck supple. No tracheal deviation present. No thyromegaly present. Cardiovascular: Normal rate, regular rhythm, S1 normal, S2 normal and normal heart sounds. Pulmonary/Chest: Effort normal and breath sounds normal. No respiratory distress. She has no wheezes. Abdominal: Soft. She exhibits no distension and no mass. There is no tenderness. Musculoskeletal: She exhibits no edema. Right lower leg: She exhibits no edema. Left lower leg: She exhibits no edema. Neurological: She is alert. Skin: Skin is warm, dry and intact. Psychiatric: She has a normal mood and affect. Nursing note and vitals reviewed. Vitals:    08/19/19 1010   BP: 120/70   Site: Left Upper Arm   Position: Sitting   Cuff Size: Child   Pulse: 94   Temp: 98.3 °F (36.8 °C)   TempSrc: Oral   Weight: 102 lb 12.8 oz (46.6 kg)     Body mass index is 20.08 kg/m². Wt Readings from Last 3 Encounters:   08/19/19 102 lb 12.8 oz (46.6 kg)   05/24/19 106 lb 9.6 oz (48.4 kg)   05/13/19 106 lb 9.6 oz (48.4 kg)     BP Readings from Last 3 Encounters:   08/19/19 120/70   05/24/19 131/60   05/13/19 104/60          Results for orders placed or performed in visit on 08/19/19   POCT Urinalysis no Micro   Result Value Ref Range    Color, UA yellow     Clarity, UA clear     Glucose, UA POC negative     Bilirubin, UA negative     Ketones, UA negative     Spec Grav, UA 1.010     Blood, UA POC negative     pH, UA 7.0     Protein, UA POC negative     Urobilinogen, UA 0.2 E.U./dL     Leukocytes, UA trace     Nitrite, UA negative      The ASCVD Risk score Kaitlin Santos DC, Jr., et al., 2013) failed to calculate for the following reasons:     The 2013 ASCVD risk score is only valid for ages 36 to 78  Lab Review not applicable        Assessment:       Diagnosis Orders   1. Abnormal urine odor  POCT Urinalysis no Micro    URINE CULTURE   2. Senile debility     3. Weight loss             Plan:      Urine is normal.  Reassurance. Re-check prn or in 6 months. Sample of ensure given. Encourage to consume 3 per day. For now patient and caregiver don't want any other changes.

## 2019-08-21 LAB — URINE CULTURE, ROUTINE: NORMAL

## 2019-08-21 NOTE — TELEPHONE ENCOUNTER
[facility] gave patient a refill on methimazole 5mg tablet with directions stating Take 1 tablet 2 times daily. Viridiana Rebollar stated that patient is taking methimazole 5mg tablet once a day. Please send new script to [facility]. Also patient is living in [facility] section Room [address] . Please send a letter to [facility] stating that patient is taking methimazole 5mg tablet once a day.

## 2019-08-22 RX ORDER — METHIMAZOLE 5 MG/1
1 TABLET ORAL DAILY
COMMUNITY
End: 2020-01-01

## 2019-09-06 ENCOUNTER — NURSE ONLY (OUTPATIENT)
Dept: FAMILY MEDICINE CLINIC | Age: 84
End: 2019-09-06
Payer: COMMERCIAL

## 2019-09-06 ENCOUNTER — OFFICE VISIT (OUTPATIENT)
Dept: CARDIOLOGY CLINIC | Age: 84
End: 2019-09-06
Payer: COMMERCIAL

## 2019-09-06 VITALS — BODY MASS INDEX: 20.35 KG/M2 | WEIGHT: 104.2 LBS

## 2019-09-06 VITALS
WEIGHT: 104.2 LBS | SYSTOLIC BLOOD PRESSURE: 102 MMHG | HEART RATE: 60 BPM | DIASTOLIC BLOOD PRESSURE: 60 MMHG | HEIGHT: 59 IN | BODY MASS INDEX: 21 KG/M2

## 2019-09-06 DIAGNOSIS — I48.0 PAF (PAROXYSMAL ATRIAL FIBRILLATION) (HCC): Primary | ICD-10-CM

## 2019-09-06 DIAGNOSIS — Z23 NEED FOR INFLUENZA VACCINATION: Primary | ICD-10-CM

## 2019-09-06 PROCEDURE — G0008 ADMIN INFLUENZA VIRUS VAC: HCPCS | Performed by: FAMILY MEDICINE

## 2019-09-06 PROCEDURE — 90653 IIV ADJUVANT VACCINE IM: CPT | Performed by: FAMILY MEDICINE

## 2019-09-06 PROCEDURE — 99214 OFFICE O/P EST MOD 30 MIN: CPT | Performed by: INTERNAL MEDICINE

## 2019-09-06 RX ORDER — ASPIRIN 81 MG/1
81 TABLET ORAL DAILY
Qty: 90 TABLET | Refills: 1 | Status: SHIPPED | OUTPATIENT
Start: 2019-09-06 | End: 2019-11-21

## 2019-09-06 NOTE — PROGRESS NOTES
Vaccine Information Sheet, \"Influenza - Inactivated\"  given to Merle Celeste, or parent/legal guardian of  Merle Celeste and verbalized understanding. Patient responses:    Have you ever had a reaction to a flu vaccine? No  Are you able to eat eggs without adverse effects? Yes  Do you have any current illness? No  Have you ever had [place] Syndrome? No    Flu vaccine given per order. Please see immunization tab.

## 2019-09-06 NOTE — PROGRESS NOTES
atrophic changes with findings suggesting chronic microvascular ischemia.  History of echocardiogram 09/26/2016    EF60%-moderate AS, moderate MR, MILD AI, enlarged LA, MODERATE TR    IBS (irritable bowel syndrome)     Mitral regurgitation     moderate severity; Tricuspid Regurgitation: mild severity    Osteoarthritis     primarily affecting the neck, fingers and knees    Osteoporosis     Pacemaker     Palpitations     Pedal edema      Past Surgical History:   Procedure Laterality Date    APPENDECTOMY  18's    with choley    BLADDER SURGERY      per old chart had bladder bx and fulgeration done 7/2007    BREAST BIOPSY  1965    left breast - negative for cancer    CARDIOVASCULAR STRESS TEST  11/2007    treadmill    CATARACT REMOVAL  12/2008    Left    CHOLECYSTECTOMY  1980's    with appy    COLONOSCOPY  2010   [facility]    left inguinal    INGUINAL HERNIA REPAIR  08-20-12    Right Side     PACEMAKER INSERTION  03/14/2014    L upper chest// per old chart had 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    IL SONO GUIDE NEEDLE BIOPSY  12/2018    TONSILLECTOMY AND ADENOIDECTOMY  1941    VARICOSE VEIN SURGERY  1960's per old chart    stripping bilat legs     Family History   Problem Relation Age of Onset    High Blood Pressure Sister     Other Sister         sinus problems, vericose veins    Other Father         GI probloems - hx obstruction    Heart Disease Sister         CHF    Diabetes Sister         s/p partial pancreas removal    Early Death Daughter 40        unknown cause    Asthma Son     Cancer Brother         leukemia    Heart Disease Brother         CABG    Early Death Brother         Murdered    Other Brother         dies at 80. unknown cause.     Cancer Brother         throat    Early Death Brother 5        pneumonia     Social History     Tobacco Use    Smoking status: Never Smoker    Smokeless tobacco: Never Used   Substance Use arteries pulse and amplitude are normal no bruit, no abdominal bruit noted ( normal abdominal aorta ausculation), femoral arteries pulse and amplitude are normal no bruit, pedal pulses are normal  Femoral pulses have normal amplitude, no bruits   Extremities - No cyanosis, clubbing, or significant edema, no varicose veins    Abdomen - No masses, tenderness, or organomegaly, no hepato-splenomegally, no bruits  Musculoskeletal - No significant edema, no kyphosis or scoliosis, no deformity in any extremity noted, muscle strength and tone are normal  Skin: no ulcer,no scar,no stasis dermatitis   Neurologic - alert oriented times 3,Cranial nerves II through XII are grossly intact. There were no gross focal neurologic abnormalities. All sensory and motor nerves examined and were normal  Psychiatric: normal mood and affect    Lab Results   Component Value Date    CKTOTAL 80 03/28/2019    TROPONINI 0.250 04/23/2014     BNP:    Lab Results   Component Value Date    BNP 1,086 04/22/2014     PT/INR:  No results found for: PTINR  No results found for: LABA1C  Lab Results   Component Value Date    CHOL 147 03/29/2019    TRIG 54 03/29/2019    HDL 60 03/29/2019    LDLCALC 114 (H) 05/12/2012    LDLDIRECT 97 03/29/2019     Lab Results   Component Value Date    ALT 14 05/24/2019    AST 25 05/24/2019     TSH:    Lab Results   Component Value Date    TSH 0.06 11/06/2018   PACER/ICD  INTERROGATION      LIFE/VOLTAGE:ADEQUATE  A FIB:No  ATRIAL PACING:YES  VENTRICULAR PACING:YES  SHOCKS:No  ALL lead thresholds, impedence and functions are normal  Recommend to continue routine evaluation      Impression:  Ramin Xie is a 80 y. o.year old who  has a past medical history of Atrial fibrillation and flutter ([address] CHF (congestive heart failure) ([facility] [address] Diverticulosis, Enlarged thyroid, GERD (gastroesophageal reflux disease), H/O echocardiogram, H/O subconjunctival hemorrhage, History of CT scan of head, History of echocardiogram, IBS (irritable

## 2019-09-27 RX ORDER — DIGOXIN 125 MCG
125 TABLET ORAL DAILY
Qty: 90 TABLET | Refills: 3 | Status: SHIPPED | OUTPATIENT
Start: 2019-09-27 | End: 2020-01-01 | Stop reason: SDUPTHER

## 2019-10-04 RX ORDER — FUROSEMIDE 20 MG/1
20 TABLET ORAL DAILY
Qty: 90 TABLET | Refills: 3 | Status: SHIPPED | OUTPATIENT
Start: 2019-10-04 | End: 2020-01-01 | Stop reason: SDUPTHER

## 2019-10-24 RX ORDER — FUROSEMIDE 20 MG/1
20 TABLET ORAL DAILY
Qty: 90 TABLET | Refills: 3 | Status: CANCELLED | OUTPATIENT
Start: 2019-10-24

## 2019-11-15 DIAGNOSIS — E05.90 HYPERTHYROIDISM: ICD-10-CM

## 2019-11-15 RX ORDER — METHIMAZOLE 5 MG/1
5 TABLET ORAL DAILY
Qty: 30 TABLET | Refills: 2 | Status: SHIPPED | OUTPATIENT
Start: 2019-11-15 | End: 2019-11-21 | Stop reason: SDUPTHER

## 2019-11-21 ENCOUNTER — HOSPITAL ENCOUNTER (EMERGENCY)
Age: 84
Discharge: HOME | End: 2019-11-21
Attending: EMERGENCY MEDICINE
Payer: COMMERCIAL

## 2019-11-21 ENCOUNTER — APPOINTMENT (OUTPATIENT)
Dept: CT IMAGING | Age: 84
End: 2019-11-21
Payer: COMMERCIAL

## 2019-11-21 ENCOUNTER — APPOINTMENT (OUTPATIENT)
Dept: GENERAL RADIOLOGY | Age: 84
End: 2019-11-21
Payer: COMMERCIAL

## 2019-11-21 VITALS
RESPIRATION RATE: 21 BRPM | WEIGHT: 104 LBS | TEMPERATURE: 98.4 F | HEIGHT: 59 IN | HEART RATE: 72 BPM | DIASTOLIC BLOOD PRESSURE: 66 MMHG | OXYGEN SATURATION: 100 % | BODY MASS INDEX: 20.96 KG/M2 | SYSTOLIC BLOOD PRESSURE: 146 MMHG

## 2019-11-21 DIAGNOSIS — R41.82 ALTERED MENTAL STATUS, UNSPECIFIED ALTERED MENTAL STATUS TYPE: Primary | ICD-10-CM

## 2019-11-21 LAB
ALBUMIN SERPL-MCNC: 4.2 GM/DL (ref 3.4–5)
ALP BLD-CCNC: 90 IU/L (ref 40–128)
ALT SERPL-CCNC: 12 U/L (ref 10–40)
ANION GAP SERPL CALCULATED.3IONS-SCNC: 12 MMOL/L (ref 4–16)
AST SERPL-CCNC: 30 IU/L (ref 15–37)
BACTERIA: NEGATIVE /HPF
BASOPHILS ABSOLUTE: 0 K/CU MM
BASOPHILS RELATIVE PERCENT: 0.4 % (ref 0–1)
BILIRUB SERPL-MCNC: 1.1 MG/DL (ref 0–1)
BILIRUBIN URINE: NEGATIVE MG/DL
BLOOD, URINE: ABNORMAL
BUN BLDV-MCNC: 13 MG/DL (ref 6–23)
CALCIUM SERPL-MCNC: 9.1 MG/DL (ref 8.3–10.6)
CHLORIDE BLD-SCNC: 96 MMOL/L (ref 99–110)
CLARITY: CLEAR
CO2: 25 MMOL/L (ref 21–32)
COLOR: YELLOW
CREAT SERPL-MCNC: 0.6 MG/DL (ref 0.6–1.1)
DIFFERENTIAL TYPE: ABNORMAL
EOSINOPHILS ABSOLUTE: 0 K/CU MM
EOSINOPHILS RELATIVE PERCENT: 0 % (ref 0–3)
GFR AFRICAN AMERICAN: >60 ML/MIN/1.73M2
GFR NON-AFRICAN AMERICAN: >60 ML/MIN/1.73M2
GLUCOSE BLD-MCNC: 120 MG/DL (ref 70–99)
GLUCOSE BLD-MCNC: 98 MG/DL (ref 70–99)
GLUCOSE, URINE: NEGATIVE MG/DL
HCT VFR BLD CALC: 43.6 % (ref 37–47)
HEMOGLOBIN: 13.4 GM/DL (ref 12.5–16)
IMMATURE NEUTROPHIL %: 0.3 % (ref 0–0.43)
INR BLD: 1.09 INDEX
KETONES, URINE: NEGATIVE MG/DL
LACTATE: 1.9 MMOL/L (ref 0.4–2)
LEUKOCYTE ESTERASE, URINE: NEGATIVE
LYMPHOCYTES ABSOLUTE: 0.6 K/CU MM
LYMPHOCYTES RELATIVE PERCENT: 5.8 % (ref 24–44)
MCH RBC QN AUTO: 28.7 PG (ref 27–31)
MCHC RBC AUTO-ENTMCNC: 30.7 % (ref 32–36)
MCV RBC AUTO: 93.4 FL (ref 78–100)
MONOCYTES ABSOLUTE: 0.4 K/CU MM
MONOCYTES RELATIVE PERCENT: 4.4 % (ref 0–4)
MUCUS: ABNORMAL HPF
NITRITE URINE, QUANTITATIVE: NEGATIVE
NUCLEATED RBC %: 0 %
PDW BLD-RTO: 15.8 % (ref 11.7–14.9)
PH, URINE: 8 (ref 5–8)
PLATELET # BLD: 199 K/CU MM (ref 140–440)
PMV BLD AUTO: 10.1 FL (ref 7.5–11.1)
POTASSIUM SERPL-SCNC: 4 MMOL/L (ref 3.5–5.1)
PROTEIN UA: 30 MG/DL
PROTHROMBIN TIME: 12.4 SECONDS (ref 11.7–14.5)
RBC # BLD: 4.67 M/CU MM (ref 4.2–5.4)
RBC URINE: 7 /HPF (ref 0–6)
SEGMENTED NEUTROPHILS ABSOLUTE COUNT: 8.9 K/CU MM
SEGMENTED NEUTROPHILS RELATIVE PERCENT: 89.1 % (ref 36–66)
SODIUM BLD-SCNC: 133 MMOL/L (ref 135–145)
SPECIFIC GRAVITY UA: 1.01 (ref 1–1.03)
SQUAMOUS EPITHELIAL: <1 /HPF
TOTAL IMMATURE NEUTOROPHIL: 0.03 K/CU MM
TOTAL NUCLEATED RBC: 0 K/CU MM
TOTAL PROTEIN: 7.7 GM/DL (ref 6.4–8.2)
TRICHOMONAS: ABNORMAL /HPF
TROPONIN T: 0.02 NG/ML
UROBILINOGEN, URINE: NORMAL MG/DL (ref 0.2–1)
WBC # BLD: 10 K/CU MM (ref 4–10.5)
WBC UA: <1 /HPF (ref 0–5)

## 2019-11-21 PROCEDURE — 85610 PROTHROMBIN TIME: CPT

## 2019-11-21 PROCEDURE — 80053 COMPREHEN METABOLIC PANEL: CPT

## 2019-11-21 PROCEDURE — 87086 URINE CULTURE/COLONY COUNT: CPT

## 2019-11-21 PROCEDURE — 85025 COMPLETE CBC W/AUTO DIFF WBC: CPT

## 2019-11-21 PROCEDURE — 71045 X-RAY EXAM CHEST 1 VIEW: CPT

## 2019-11-21 PROCEDURE — 84484 ASSAY OF TROPONIN QUANT: CPT

## 2019-11-21 PROCEDURE — 87040 BLOOD CULTURE FOR BACTERIA: CPT

## 2019-11-21 PROCEDURE — 36415 COLL VENOUS BLD VENIPUNCTURE: CPT

## 2019-11-21 PROCEDURE — 82962 GLUCOSE BLOOD TEST: CPT

## 2019-11-21 PROCEDURE — 2580000003 HC RX 258: Performed by: EMERGENCY MEDICINE

## 2019-11-21 PROCEDURE — 70450 CT HEAD/BRAIN W/O DYE: CPT

## 2019-11-21 PROCEDURE — 81001 URINALYSIS AUTO W/SCOPE: CPT

## 2019-11-21 PROCEDURE — 99285 EMERGENCY DEPT VISIT HI MDM: CPT

## 2019-11-21 PROCEDURE — 93005 ELECTROCARDIOGRAM TRACING: CPT | Performed by: EMERGENCY MEDICINE

## 2019-11-21 PROCEDURE — 83605 ASSAY OF LACTIC ACID: CPT

## 2019-11-21 RX ORDER — SODIUM CHLORIDE 9 MG/ML
INJECTION, SOLUTION INTRAVENOUS CONTINUOUS
Status: DISCONTINUED | OUTPATIENT
Start: 2019-11-21 | End: 2019-11-21 | Stop reason: HOSPADM

## 2019-11-21 RX ADMIN — SODIUM CHLORIDE: 9 INJECTION, SOLUTION INTRAVENOUS at 12:19

## 2019-11-22 LAB
CULTURE: NORMAL
Lab: NORMAL
SPECIMEN: NORMAL

## 2019-11-22 PROCEDURE — 93010 ELECTROCARDIOGRAM REPORT: CPT | Performed by: INTERNAL MEDICINE

## 2019-11-25 ENCOUNTER — TELEPHONE (OUTPATIENT)
Dept: FAMILY MEDICINE CLINIC | Age: 84
End: 2019-11-25

## 2019-11-26 ENCOUNTER — TELEPHONE (OUTPATIENT)
Dept: FAMILY MEDICINE CLINIC | Age: 84
End: 2019-11-26

## 2019-11-26 LAB
CULTURE: NORMAL
CULTURE: NORMAL
Lab: NORMAL
Lab: NORMAL
SPECIMEN: NORMAL
SPECIMEN: NORMAL

## 2019-11-27 LAB
EKG ATRIAL RATE: 26 BPM
EKG DIAGNOSIS: NORMAL
EKG Q-T INTERVAL: 346 MS
EKG QRS DURATION: 98 MS
EKG QTC CALCULATION (BAZETT): 399 MS
EKG R AXIS: -10 DEGREES
EKG T AXIS: -89 DEGREES
EKG VENTRICULAR RATE: 80 BPM

## 2019-12-06 ENCOUNTER — PROCEDURE VISIT (OUTPATIENT)
Dept: CARDIOLOGY CLINIC | Age: 84
End: 2019-12-06
Payer: COMMERCIAL

## 2019-12-06 ENCOUNTER — OFFICE VISIT (OUTPATIENT)
Dept: CARDIOLOGY CLINIC | Age: 84
End: 2019-12-06
Payer: COMMERCIAL

## 2019-12-06 VITALS
HEART RATE: 70 BPM | DIASTOLIC BLOOD PRESSURE: 60 MMHG | WEIGHT: 106 LBS | HEIGHT: 61 IN | SYSTOLIC BLOOD PRESSURE: 110 MMHG | BODY MASS INDEX: 20.01 KG/M2

## 2019-12-06 VITALS — HEART RATE: 70 BPM

## 2019-12-06 DIAGNOSIS — I48.0 PAF (PAROXYSMAL ATRIAL FIBRILLATION) (HCC): Primary | ICD-10-CM

## 2019-12-06 DIAGNOSIS — Z95.0 CARDIAC PACEMAKER IN SITU: Primary | ICD-10-CM

## 2019-12-06 PROCEDURE — 99214 OFFICE O/P EST MOD 30 MIN: CPT | Performed by: INTERNAL MEDICINE

## 2019-12-06 PROCEDURE — 93280 PM DEVICE PROGR EVAL DUAL: CPT | Performed by: INTERNAL MEDICINE

## 2020-01-01 ENCOUNTER — HOSPITAL ENCOUNTER (OUTPATIENT)
Age: 85
Discharge: HOME | End: 2020-10-14
Payer: COMMERCIAL

## 2020-01-01 ENCOUNTER — TELEMEDICINE (OUTPATIENT)
Dept: CARDIOLOGY CLINIC | Age: 85
End: 2020-01-01
Payer: COMMERCIAL

## 2020-01-01 ENCOUNTER — TELEPHONE (OUTPATIENT)
Dept: FAMILY MEDICINE CLINIC | Age: 85
End: 2020-01-01

## 2020-01-01 ENCOUNTER — HOSPITAL ENCOUNTER (OUTPATIENT)
Dept: GENERAL RADIOLOGY | Age: 85
Discharge: HOME | End: 2020-10-14
Payer: COMMERCIAL

## 2020-01-01 ENCOUNTER — VIRTUAL VISIT (OUTPATIENT)
Dept: CARDIOLOGY CLINIC | Age: 85
End: 2020-01-01
Payer: COMMERCIAL

## 2020-01-01 ENCOUNTER — TELEPHONE (OUTPATIENT)
Dept: CARDIOLOGY CLINIC | Age: 85
End: 2020-01-01

## 2020-01-01 ENCOUNTER — HOSPITAL ENCOUNTER (OUTPATIENT)
Dept: ULTRASOUND IMAGING | Age: 85
Discharge: HOME | End: 2020-10-14
Payer: COMMERCIAL

## 2020-01-01 ENCOUNTER — HOSPITAL ENCOUNTER (OUTPATIENT)
Dept: WOUND CARE | Age: 85
Discharge: HOME | End: 2020-10-27
Payer: COMMERCIAL

## 2020-01-01 ENCOUNTER — OFFICE VISIT (OUTPATIENT)
Dept: FAMILY MEDICINE CLINIC | Age: 85
End: 2020-01-01
Payer: COMMERCIAL

## 2020-01-01 ENCOUNTER — TELEPHONE (OUTPATIENT)
Dept: WOUND CARE | Age: 85
End: 2020-01-01

## 2020-01-01 ENCOUNTER — PROCEDURE VISIT (OUTPATIENT)
Dept: CARDIOLOGY CLINIC | Age: 85
End: 2020-01-01
Payer: COMMERCIAL

## 2020-01-01 ENCOUNTER — HOSPITAL ENCOUNTER (OUTPATIENT)
Dept: WOUND CARE | Age: 85
Discharge: HOME | End: 2020-10-13
Payer: COMMERCIAL

## 2020-01-01 ENCOUNTER — HOSPITAL ENCOUNTER (OUTPATIENT)
Dept: WOUND CARE | Age: 85
Discharge: HOME | End: 2020-10-20
Payer: COMMERCIAL

## 2020-01-01 ENCOUNTER — OFFICE VISIT (OUTPATIENT)
Dept: CARDIOLOGY CLINIC | Age: 85
End: 2020-01-01
Payer: COMMERCIAL

## 2020-01-01 VITALS
BODY MASS INDEX: 18.12 KG/M2 | WEIGHT: 96 LBS | HEART RATE: 68 BPM | DIASTOLIC BLOOD PRESSURE: 70 MMHG | SYSTOLIC BLOOD PRESSURE: 110 MMHG | HEIGHT: 61 IN

## 2020-01-01 VITALS
DIASTOLIC BLOOD PRESSURE: 70 MMHG | RESPIRATION RATE: 16 BRPM | TEMPERATURE: 98.2 F | SYSTOLIC BLOOD PRESSURE: 107 MMHG | HEART RATE: 68 BPM

## 2020-01-01 VITALS
BODY MASS INDEX: 18.16 KG/M2 | HEIGHT: 61 IN | SYSTOLIC BLOOD PRESSURE: 116 MMHG | DIASTOLIC BLOOD PRESSURE: 70 MMHG | TEMPERATURE: 97.8 F | WEIGHT: 96.2 LBS | OXYGEN SATURATION: 96 % | HEART RATE: 107 BPM

## 2020-01-01 VITALS
HEART RATE: 88 BPM | TEMPERATURE: 98.2 F | RESPIRATION RATE: 15 BRPM | SYSTOLIC BLOOD PRESSURE: 148 MMHG | DIASTOLIC BLOOD PRESSURE: 78 MMHG

## 2020-01-01 VITALS
OXYGEN SATURATION: 97 % | DIASTOLIC BLOOD PRESSURE: 70 MMHG | TEMPERATURE: 97.7 F | HEART RATE: 65 BPM | SYSTOLIC BLOOD PRESSURE: 116 MMHG

## 2020-01-01 DIAGNOSIS — E05.90 HYPERTHYROIDISM: ICD-10-CM

## 2020-01-01 LAB
A/G RATIO: 1.2 (ref 1.1–2.2)
ALBUMIN SERPL-MCNC: 3.7 G/DL (ref 3.4–5)
ALP BLD-CCNC: 81 U/L (ref 40–129)
ALT SERPL-CCNC: 8 U/L (ref 10–40)
ANION GAP SERPL CALCULATED.3IONS-SCNC: 9 MMOL/L (ref 3–16)
AST SERPL-CCNC: 19 U/L (ref 15–37)
BASOPHILS ABSOLUTE: 0.1 K/UL (ref 0–0.2)
BASOPHILS RELATIVE PERCENT: 1.1 %
BILIRUB SERPL-MCNC: 0.6 MG/DL (ref 0–1)
BUN BLDV-MCNC: 11 MG/DL (ref 7–20)
CALCIUM SERPL-MCNC: 8.8 MG/DL (ref 8.3–10.6)
CHLORIDE BLD-SCNC: 100 MMOL/L (ref 99–110)
CO2: 30 MMOL/L (ref 21–32)
CREAT SERPL-MCNC: 0.6 MG/DL (ref 0.6–1.2)
CULTURE: NORMAL
EOSINOPHILS ABSOLUTE: 0.1 K/UL (ref 0–0.6)
EOSINOPHILS RELATIVE PERCENT: 1.6 %
GFR AFRICAN AMERICAN: >60
GFR NON-AFRICAN AMERICAN: >60
GLOBULIN: 3.2 G/DL
GLUCOSE BLD-MCNC: 125 MG/DL (ref 70–99)
HCT VFR BLD CALC: 36.8 % (ref 36–48)
HEMOGLOBIN: 11.9 G/DL (ref 12–16)
LYMPHOCYTES ABSOLUTE: 0.8 K/UL (ref 1–5.1)
LYMPHOCYTES RELATIVE PERCENT: 10.1 %
Lab: NORMAL
MCH RBC QN AUTO: 27.7 PG (ref 26–34)
MCHC RBC AUTO-ENTMCNC: 32.4 G/DL (ref 31–36)
MCV RBC AUTO: 85.5 FL (ref 80–100)
MONOCYTES ABSOLUTE: 0.4 K/UL (ref 0–1.3)
MONOCYTES RELATIVE PERCENT: 5.6 %
NEUTROPHILS ABSOLUTE: 6.5 K/UL (ref 1.7–7.7)
NEUTROPHILS RELATIVE PERCENT: 81.6 %
PDW BLD-RTO: 16.8 % (ref 12.4–15.4)
PLATELET # BLD: 206 K/UL (ref 135–450)
PMV BLD AUTO: 8.9 FL (ref 5–10.5)
POTASSIUM SERPL-SCNC: 3.9 MMOL/L (ref 3.5–5.1)
RBC # BLD: 4.31 M/UL (ref 4–5.2)
SODIUM BLD-SCNC: 139 MMOL/L (ref 136–145)
SPECIMEN: NORMAL
T4 FREE: 1.4 NG/DL (ref 0.9–1.8)
TOTAL PROTEIN: 6.9 G/DL (ref 6.4–8.2)
TSH REFLEX: 3.94 UIU/ML (ref 0.27–4.2)
WBC # BLD: 8 K/UL (ref 4–11)

## 2020-01-01 PROCEDURE — 72070 X-RAY EXAM THORAC SPINE 2VWS: CPT

## 2020-01-01 PROCEDURE — 99441 PR PHYS/QHP TELEPHONE EVALUATION 5-10 MIN: CPT | Performed by: INTERNAL MEDICINE

## 2020-01-01 PROCEDURE — 99215 OFFICE O/P EST HI 40 MIN: CPT | Performed by: INTERNAL MEDICINE

## 2020-01-01 PROCEDURE — 99203 OFFICE O/P NEW LOW 30 MIN: CPT | Performed by: NURSE PRACTITIONER

## 2020-01-01 PROCEDURE — 93925 LOWER EXTREMITY STUDY: CPT

## 2020-01-01 PROCEDURE — 99213 OFFICE O/P EST LOW 20 MIN: CPT | Performed by: FAMILY MEDICINE

## 2020-01-01 PROCEDURE — 36415 COLL VENOUS BLD VENIPUNCTURE: CPT | Performed by: FAMILY MEDICINE

## 2020-01-01 PROCEDURE — 99214 OFFICE O/P EST MOD 30 MIN: CPT | Performed by: INTERNAL MEDICINE

## 2020-01-01 PROCEDURE — 11719 TRIM NAIL(S) ANY NUMBER: CPT

## 2020-01-01 PROCEDURE — 72110 X-RAY EXAM L-2 SPINE 4/>VWS: CPT

## 2020-01-01 PROCEDURE — 99213 OFFICE O/P EST LOW 20 MIN: CPT

## 2020-01-01 PROCEDURE — 93296 REM INTERROG EVL PM/IDS: CPT | Performed by: INTERNAL MEDICINE

## 2020-01-01 PROCEDURE — 11042 DBRDMT SUBQ TIS 1ST 20SQCM/<: CPT

## 2020-01-01 PROCEDURE — 93294 REM INTERROG EVL PM/LDLS PM: CPT | Performed by: INTERNAL MEDICINE

## 2020-01-01 PROCEDURE — 93971 EXTREMITY STUDY: CPT | Performed by: INTERNAL MEDICINE

## 2020-01-01 PROCEDURE — 99214 OFFICE O/P EST MOD 30 MIN: CPT | Performed by: FAMILY MEDICINE

## 2020-01-01 PROCEDURE — 87070 CULTURE OTHR SPECIMN AEROBIC: CPT

## 2020-01-01 PROCEDURE — 11042 DBRDMT SUBQ TIS 1ST 20SQCM/<: CPT | Performed by: NURSE PRACTITIONER

## 2020-01-01 PROCEDURE — 90694 VACC AIIV4 NO PRSRV 0.5ML IM: CPT | Performed by: FAMILY MEDICINE

## 2020-01-01 PROCEDURE — 11719 TRIM NAIL(S) ANY NUMBER: CPT | Performed by: NURSE PRACTITIONER

## 2020-01-01 PROCEDURE — 87075 CULTR BACTERIA EXCEPT BLOOD: CPT

## 2020-01-01 PROCEDURE — G0008 ADMIN INFLUENZA VIRUS VAC: HCPCS | Performed by: FAMILY MEDICINE

## 2020-01-01 RX ORDER — LIDOCAINE HYDROCHLORIDE 40 MG/ML
SOLUTION TOPICAL ONCE
Status: CANCELLED | OUTPATIENT
Start: 2020-01-01

## 2020-01-01 RX ORDER — GENTAMICIN SULFATE 1 MG/G
OINTMENT TOPICAL ONCE
Status: CANCELLED | OUTPATIENT
Start: 2020-01-01

## 2020-01-01 RX ORDER — GINSENG 100 MG
CAPSULE ORAL ONCE
Status: CANCELLED | OUTPATIENT
Start: 2020-01-01

## 2020-01-01 RX ORDER — FEEDER CONTAINER WITH PUMP SET
EACH MISCELLANEOUS
Qty: 90 CAN | Refills: 11 | Status: SHIPPED | OUTPATIENT
Start: 2020-01-01 | End: 2020-01-01 | Stop reason: SDUPTHER

## 2020-01-01 RX ORDER — FUROSEMIDE 20 MG/1
20 TABLET ORAL DAILY
Qty: 90 TABLET | Refills: 3 | Status: SHIPPED | OUTPATIENT
Start: 2020-01-01 | End: 2020-01-01 | Stop reason: SDUPTHER

## 2020-01-01 RX ORDER — CLOBETASOL PROPIONATE 0.5 MG/G
OINTMENT TOPICAL ONCE
Status: CANCELLED | OUTPATIENT
Start: 2020-01-01

## 2020-01-01 RX ORDER — LIDOCAINE 50 MG/G
OINTMENT TOPICAL ONCE
Status: CANCELLED | OUTPATIENT
Start: 2020-01-01

## 2020-01-01 RX ORDER — LIDOCAINE HYDROCHLORIDE 40 MG/ML
SOLUTION TOPICAL ONCE
Status: DISCONTINUED | OUTPATIENT
Start: 2020-01-01 | End: 2020-01-01 | Stop reason: HOSPADM

## 2020-01-01 RX ORDER — LIDOCAINE 40 MG/G
CREAM TOPICAL ONCE
Status: CANCELLED | OUTPATIENT
Start: 2020-01-01

## 2020-01-01 RX ORDER — BACITRACIN, NEOMYCIN, POLYMYXIN B 400; 3.5; 5 [USP'U]/G; MG/G; [USP'U]/G
OINTMENT TOPICAL ONCE
Status: CANCELLED | OUTPATIENT
Start: 2020-01-01

## 2020-01-01 RX ORDER — BETAMETHASONE DIPROPIONATE 0.05 %
OINTMENT (GRAM) TOPICAL ONCE
Status: CANCELLED | OUTPATIENT
Start: 2020-01-01

## 2020-01-01 RX ORDER — FUROSEMIDE 20 MG/1
20 TABLET ORAL DAILY
Qty: 90 TABLET | Refills: 3 | Status: SHIPPED | OUTPATIENT
Start: 2020-01-01

## 2020-01-01 RX ORDER — BACITRACIN ZINC AND POLYMYXIN B SULFATE 500; 1000 [USP'U]/G; [USP'U]/G
OINTMENT TOPICAL ONCE
Status: CANCELLED | OUTPATIENT
Start: 2020-01-01

## 2020-01-01 RX ORDER — LIDOCAINE HYDROCHLORIDE 20 MG/ML
JELLY TOPICAL ONCE
Status: CANCELLED | OUTPATIENT
Start: 2020-01-01

## 2020-01-01 RX ORDER — METHIMAZOLE 5 MG/1
5 TABLET ORAL DAILY
Qty: 90 TABLET | Refills: 3 | OUTPATIENT
Start: 2020-01-01

## 2020-01-01 RX ORDER — FEEDER CONTAINER WITH PUMP SET
EACH MISCELLANEOUS
Qty: 90 CAN | Refills: 11 | Status: SHIPPED | OUTPATIENT
Start: 2020-01-01

## 2020-01-01 RX ORDER — DIGOXIN 125 MCG
125 TABLET ORAL DAILY
Qty: 90 TABLET | Refills: 3 | Status: SHIPPED | OUTPATIENT
Start: 2020-01-01

## 2020-01-01 RX ORDER — GENTAMICIN SULFATE 1 MG/G
OINTMENT TOPICAL ONCE
Status: DISCONTINUED | OUTPATIENT
Start: 2020-01-01 | End: 2020-01-01 | Stop reason: HOSPADM

## 2020-01-01 RX ORDER — ASPIRIN 81 MG/1
81 TABLET ORAL DAILY
Qty: 90 TABLET | Refills: 1 | Status: SHIPPED | OUTPATIENT
Start: 2020-01-01 | End: 2021-01-01 | Stop reason: ALTCHOICE

## 2020-01-01 RX ORDER — GENTAMICIN SULFATE 1 MG/G
OINTMENT TOPICAL
Qty: 30 G | Refills: 1 | Status: SHIPPED | OUTPATIENT
Start: 2020-01-01 | End: 2020-01-01

## 2020-01-01 ASSESSMENT — ENCOUNTER SYMPTOMS
COUGH: 0
VOMITING: 0
SHORTNESS OF BREATH: 0
DIARRHEA: 0
EYE REDNESS: 0
NAUSEA: 0
SORE THROAT: 0

## 2020-01-15 LAB
BASOPHILS ABSOLUTE: 0.1 /ΜL
BASOPHILS RELATIVE PERCENT: 1.1 %
EOSINOPHILS ABSOLUTE: 0.3 /ΜL
EOSINOPHILS RELATIVE PERCENT: 3.1 %
HCT VFR BLD CALC: 40.2 % (ref 36–46)
HEMOGLOBIN: 13.2 G/DL (ref 12–16)
LYMPHOCYTES ABSOLUTE: 1.4 /ΜL
LYMPHOCYTES RELATIVE PERCENT: 17.3 %
MCH RBC QN AUTO: 29.5 PG
MCHC RBC AUTO-ENTMCNC: 32.8 G/DL
MCV RBC AUTO: 90 FL
MONOCYTES ABSOLUTE: 0.6 /ΜL
MONOCYTES RELATIVE PERCENT: 7.4 %
NEUTROPHILS ABSOLUTE: 5.7 /ΜL
NEUTROPHILS RELATIVE PERCENT: 71.1 %
PLATELET # BLD: 189 K/ΜL
PMV BLD AUTO: 9.3 FL
RBC # BLD: 4.46 10^6/ΜL
T4 FREE: 7.8
WBC # BLD: 8 10^3/ML

## 2020-01-24 ENCOUNTER — OFFICE VISIT (OUTPATIENT)
Dept: FAMILY MEDICINE CLINIC | Age: 85
End: 2020-01-24
Payer: COMMERCIAL

## 2020-01-24 VITALS
SYSTOLIC BLOOD PRESSURE: 100 MMHG | HEART RATE: 64 BPM | BODY MASS INDEX: 18.72 KG/M2 | WEIGHT: 99.1 LBS | DIASTOLIC BLOOD PRESSURE: 56 MMHG

## 2020-01-24 PROCEDURE — 90732 PPSV23 VACC 2 YRS+ SUBQ/IM: CPT | Performed by: FAMILY MEDICINE

## 2020-01-24 PROCEDURE — 99213 OFFICE O/P EST LOW 20 MIN: CPT | Performed by: FAMILY MEDICINE

## 2020-01-24 PROCEDURE — G0009 ADMIN PNEUMOCOCCAL VACCINE: HCPCS | Performed by: FAMILY MEDICINE

## 2020-01-24 RX ORDER — METHIMAZOLE 5 MG/1
5 TABLET ORAL DAILY
Qty: 90 TABLET | Refills: 3 | Status: SHIPPED | OUTPATIENT
Start: 2020-01-24

## 2020-01-24 NOTE — PROGRESS NOTES
Patient ID: Seun Barrios 12/15/1921    Chief Complaint   Patient presents with    Thyroid Problem     follow up. HPI   Hypothyroid: Taking her thyroid medication as directed. Senile debility: Per caregiver, patient has been steadily declining. Most likely is due to her advanced age. Caregiver is with her most the time. Patient is still living in assisted living. She sleeps a lot. Review of Systems   Constitutional: Positive for appetite change.        Patient Active Problem List   Diagnosis    Pedal edema    Atrial fibrillation and flutter (HCC)    CHF (congestive heart failure) (HCC)    Mitral regurgitation    Spinal stenosis of lumbar region    Risk for falls    Thyroid nodule    Senile debility    Primary osteoarthritis of both hips    Primary osteoarthritis of right knee    Osteoarthritis of left knee    Osteoarthritis of lumbar spine    TIA (transient ischemic attack)    ICH (intracerebral hemorrhage) ([facility] [address]       Past Surgical History:   Procedure Laterality Date    APPENDECTOMY  18's    with choley    BLADDER SURGERY      per old chart had bladder bx and fulgeration done 7/2007    BREAST BIOPSY  1965    left breast - negative for cancer    CARDIOVASCULAR STRESS TEST  11/2007    treadmill    CATARACT REMOVAL  12/2008    Left    CHOLECYSTECTOMY  1980's    with appy    COLONOSCOPY  2010   [address]    left inguinal    INGUINAL HERNIA REPAIR  08-20-12    Right Side     PACEMAKER INSERTION  03/14/2014    L upper chest// per old chart had 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    LA SONO GUIDE NEEDLE BIOPSY  12/2018    TONSILLECTOMY AND ADENOIDECTOMY  1941    VARICOSE VEIN SURGERY  1960's per old chart    stripping bilat legs       Family History   Problem Relation Age of Onset    High Blood Pressure Sister     Other Sister         sinus problems, vericose veins    Other Father         GI probloems - hx obstruction    Heart Disease Sister         CHF    Diabetes Sister         s/p partial pancreas removal    Early Death Daughter 40        unknown cause    Asthma Son     Cancer Brother         leukemia    Heart Disease Brother         CABG    Early Death Brother         Murdered    Other Brother         dies at 80. unknown cause.  Cancer Brother         throat    Early Death Brother 5        pneumonia       Current Outpatient Medications on File Prior to Visit   Medication Sig Dispense Refill    furosemide (LASIX) 20 MG tablet Take 1 tablet by mouth daily 90 tablet 3    digoxin (DIGOX) 125 MCG tablet Take 1 tablet by mouth daily 90 tablet 3    methIMAzole (TAPAZOLE) 5 MG tablet Take 1 tablet by mouth 2 times daily      Multiple Vitamin (MULTI-VITAMIN DAILY PO) Take 1 tablet by mouth daily       No current facility-administered medications on file prior to visit. Objective:           Physical Exam  Vitals signs and nursing note reviewed. Constitutional:       Appearance: She is underweight. HENT:      Head: Normocephalic and atraumatic. Neck:      Musculoskeletal: Neck supple. Cardiovascular:      Rate and Rhythm: Normal rate and regular rhythm. Heart sounds: Normal heart sounds, S1 normal and S2 normal.   Pulmonary:      Effort: Pulmonary effort is normal. No respiratory distress. Breath sounds: Normal breath sounds. No wheezing. Skin:     General: Skin is warm and dry. Neurological:      Mental Status: She is lethargic. Psychiatric:         Attention and Perception: She is inattentive. Behavior: Behavior is slowed. Vitals:    01/24/20 1104   BP: (!) 100/56   Site: Left Upper Arm   Position: Sitting   Cuff Size: Medium Adult   Pulse: 64   Weight: 99 lb 1.6 oz (45 kg)     Body mass index is 18.72 kg/m².      Wt Readings from Last 3 Encounters:   01/24/20 99 lb 1.6 oz (45 kg)   12/06/19 106 lb (48.1 kg)   11/21/19 104 lb (47.2 kg)     BP Readings from Last 3

## 2020-01-24 NOTE — PATIENT INSTRUCTIONS
The diagnoses and medications listed in this after visit summary may not be accurate at the time of check out. Please check MY CHART in 28-48 hours for possible corrections. Late cancellation policy: So that we can better accommodate people who are sick, please give our office 24 hour notice for an appointment cancellation. Thank you. Missed appointments: Your care is very important to us. It is important that you keep your scheduled appointments. Multiple missed appointments may lead to a dismissal from the office. Later arrival policy: If you are more than 10 minutes late for your appointment, you will be asked to reschedule. Please allow 5-7 business days for paperwork to be processed. It is important that you check your MY Chart messages, as they include appointment reminders, test results, and other important information. If you have forgotten your password, please call [phone].

## 2020-03-02 NOTE — TELEPHONE ENCOUNTER
Patients POA said she is with patient from 8am to 10pm at night. Patient is complaining of left hip pain and needs help getting out of bed. Xray was done on 3/1/2020 (results scanned and routed.)  Patients POA is concerned patient fell. Patient will not answer if she fell. Patients POA is very concern and feels its more than arthritis.  Please advise

## 2020-03-03 NOTE — TELEPHONE ENCOUNTER
xrays were normal.  Recommend tylenol up to 1000 mg 3 times per day.   Lidocaine patches, icy hot, or ilzbet-garcia

## 2020-04-01 NOTE — TELEPHONE ENCOUNTER
I CALLED PT TO SET HER UP WITH MY CHART AND SPOKE WITH DAUGHTER KARRIE AND SHE HAS A COUPLE OF QUESTIONS CONCERNING HER MEDICATIONS,  1. KARRIE WAS WONDERING IF SHE NEEDED HER DIGOXIN DAILY, SHE LOOKED IT UP AND WAS CONCERNED OF SOME OF THE SIDE EFFECTS, PT IS FINE NOW SHE STATED. 2. LASIX, SHE STATED THAT SINCE LOLA HAS SUCH A SMALL FRAME, AND SHE IS NOT SEEING ANY EDEMA DID SHE NEED THIS MEDICATION DAILY?

## 2020-04-13 NOTE — TELEPHONE ENCOUNTER
Patient had nosebleed this morning, asking if they can use saline nose spray.   All vitals were normal.

## 2020-06-22 NOTE — TELEPHONE ENCOUNTER
Patients POA called stating that the patient slept on her right arm, and now the patient arm is swollen. Patient are is not hot to touch, arm is just swollen. Please advice what patient should do

## 2020-06-25 NOTE — PROGRESS NOTES
[x] No visualized signs of difficulty breathing or respiratory distress        [] Abnormal-      Musculoskeletal:   [x] Normal gait with no signs of ataxia         [x] Normal range of motion of neck        [] Abnormal-       Neurological:        [x] No Facial Asymmetry (Cranial nerve 7 motor function) (limited exam to video visit)          [x] No gaze palsy        [] Abnormal-         Skin:        [x] No significant exanthematous lesions or discoloration noted on facial skin         [] Abnormal-            Psychiatric:       [x] Normal Affect [] No Hallucinations        [] Abnormal-     Other pertinent observable physical exam findings-     Lab Results   Component Value Date    CKTOTAL 80 03/28/2019    TROPONINI 0.250 04/23/2014     BNP:    Lab Results   Component Value Date    BNP 1,086 04/22/2014     PT/INR:  No results found for: PTINR  No results found for: LABA1C  Lab Results   Component Value Date    CHOL 147 03/29/2019    TRIG 54 03/29/2019    HDL 60 03/29/2019    LDLCALC 114 (H) 05/12/2012    LDLDIRECT 97 03/29/2019     Lab Results   Component Value Date    ALT 12 11/21/2019    AST 30 11/21/2019     TSH:    Lab Results   Component Value Date    TSH 0.06 11/06/2018       Impression:  Patricia Powers is a 80 y. o.year old who  has a past medical history of Atrial fibrillation and flutter ([facility] [address] CHF (congestive heart failure) ([facility] [address] Diverticulosis, Enlarged thyroid, GERD (gastroesophageal reflux disease), H/O echocardiogram, H/O subconjunctival hemorrhage, History of CT scan of head, History of echocardiogram, IBS (irritable bowel syndrome), Mitral regurgitation, Osteoarthritis, Osteoporosis, Pacemaker, Palpitations, and Pedal edema. and presents with     Plan:  1. Tachycardia: most likely afib related, take extra dig for 3 days ( she will take 250 mcg dig)   2. Recurrent TIA: pacer is MRI safe, back on baby aspirin,her last cat scan did not show any bleed. 3. Cough and shortness of breath: stable  4.  Rexanne Boehringer by telehealth in the setting of [address] emergency , which precluded assurance of safe in person visit at the time of service. The patient consented to and accepts potential risks associated with telemedical evaluation and care was taken to assess lan presence of any medical issues that would be more  appropriate for expedited in -person care. Pursuant to the emergency declaration under the [address] [place] [address] [facility] authority and the [employer] Act, this Virtual  Visit was conducted, with patient's consent, to reduce the patient's risk of exposure to COVID-19 and provide continuity of care for an established patient. Services were provided through a video synchronous discussion virtually to substitute for in-person clinic visit. 2. I Affirm this is a Patient Initiated Episode with an Established Patient who has not had a related appointment within my department in the past 7 days or scheduled within the next 24 hours. --Florentino Blood MD on 6/25/2020 at 11:31 AM    An electronic signature was used to authenticate this note.

## 2020-06-25 NOTE — LETTER
Miriam Miller  12/15/1921  [id number]    Have you had any Chest Pain - Yes       Have you had any Shortness of Breath - No     Have you had any dizziness - No       Have you had any palpitations - No     Is the patient on any of the following medications - NONE  If Yes DO EKG    Do you have any edema - swelling in NONE    If Yes - CHECK TO SEE IF THE EDEMA IS PITTING    Check Venous \"LEG PROBLEM Questionnaire\"    Do you have a surgery or procedure scheduled in the near future - No  If Yes- DO EKG    Ask patient if they want to sign up for MyChart if they are not already signed up    Check to see if we have an E-MAIL on file for the patient    Check medication list thoroughly!!!  BE SURE TO ASK PATIENT IF THEY NEED MEDICATION REFILLS

## 2020-06-29 NOTE — LETTER
[address] [place]  [address] [address]  Phone: [phone]  Fax: [phone]            June 29, 2020    [address]      Dear Jaylene Ross: This is your [facility] schedule. Please magdaleno your calendar with these dates. You can do your checks anytime during the scheduled day. Since we do not do reminder calls, it will be your responsibility to perform the checks on the day it is scheduled. If you have any questions or concerns, please call and ask for [place] at [phone].

## 2020-07-13 NOTE — PROGRESS NOTES
Yaneli Bill MD    TELEHEALTH EVALUATION -- Audio/Visual (During QUIWO-35 public health emergency)      Chief complaints: patient is here for management of  CHF, PPM,AFIB, DIZZINESS, EPISTAXIS, possible subarachnoid hemorrhage or cortical petechial hemorrhage, recurrent TIA    Subjective: patient feels better, no chest pain, no shortness of breath, no dizziness, no palpitations    HPI Bubba Alanis is a 80 y. o.year old who  has a past medical history of Atrial fibrillation and flutter ([address] CHF (congestive heart failure) ([address] Diverticulosis, Enlarged thyroid, GERD (gastroesophageal reflux disease), H/O echocardiogram, H/O subconjunctival hemorrhage, History of CT scan of head, History of echocardiogram, IBS (irritable bowel syndrome), Mitral regurgitation, Osteoarthritis, Osteoporosis, Pacemaker, Palpitations, and Pedal edema. and presents for management of  CHF, PPM,AFIB, DIZZINESS, EPISTAXIS, possible subarachnoid hemorrhage or cortical petechial hemorrhage, recurrent TIA which are well controlled  She as given extra digoxin daily for 3 days, her heart rate is better now    Current Outpatient Medications   Medication Sig Dispense Refill    digoxin (DIGOX) 125 MCG tablet Take 1 tablet by mouth daily 90 tablet 3    methIMAzole (TAPAZOLE) 5 MG tablet Take 1 tablet by mouth daily 90 tablet 3    furosemide (LASIX) 20 MG tablet Take 1 tablet by mouth daily 90 tablet 3    Multiple Vitamin (MULTI-VITAMIN DAILY PO) Take 1 tablet by mouth daily       No current facility-administered medications for this visit. Allergies: Dairycare [lactase-lactobacillus];  Lactose; and Milk-related compounds  Past Medical History:   Diagnosis Date    Atrial fibrillation and flutter (HCC)     CHF (congestive heart failure) (HCC)     Diverticulosis     Enlarged thyroid     GERD (gastroesophageal reflux disease)     H/O echocardiogram 05/08/2017    EF 60%     H/O subconjunctival hemorrhage     History of CT scan of head 07/09/2018    No acute intracranial abnormality. Diffuse atrophic changes with findings suggesting chronic microvascular ischemia.  History of echocardiogram 09/26/2016    EF60%-moderate AS, moderate MR, MILD AI, enlarged LA, MODERATE TR    IBS (irritable bowel syndrome)     Mitral regurgitation     moderate severity; Tricuspid Regurgitation: mild severity    Osteoarthritis     primarily affecting the neck, fingers and knees    Osteoporosis     Pacemaker     Palpitations     Pedal edema      Past Surgical History:   Procedure Laterality Date    APPENDECTOMY  18's    with choley    BLADDER SURGERY      per old chart had bladder bx and fulgeration done 7/2007    BREAST BIOPSY  1965    left breast - negative for cancer    CARDIOVASCULAR STRESS TEST  11/2007    treadmill    CATARACT REMOVAL  12/2008    Left    CHOLECYSTECTOMY  1980's    with appy    COLONOSCOPY  2010   [facility] [place]    left inguinal    INGUINAL HERNIA REPAIR  08-20-12    Right Side     PACEMAKER INSERTION  03/14/2014    L upper chest// per old chart had 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    MO SONO GUIDE NEEDLE BIOPSY  12/2018    TONSILLECTOMY AND ADENOIDECTOMY  1941    VARICOSE VEIN SURGERY  1960's per old chart    stripping bilat legs     Family History   Problem Relation Age of Onset    High Blood Pressure Sister     Other Sister         sinus problems, vericose veins    Other Father         GI probloems - hx obstruction    Heart Disease Sister         CHF    Diabetes Sister         s/p partial pancreas removal    Early Death Daughter 40        unknown cause    Asthma Son     Cancer Brother         leukemia    Heart Disease Brother         CABG    Early Death Brother         Murdered    Other Brother         dies at 80. unknown cause.     Cancer Brother         throat    Early Death Brother 5        pneumonia     Social History     Tobacco Use    Smoking status: Never Smoker    Smokeless tobacco: Never Used   Substance Use Topics    Alcohol use: No      [unfilled]  Review of Systems:   · Constitutional: No Fever or Weight Loss   · Eyes: No Decreased Vision  · ENT: No Headaches, Hearing Loss or Vertigo  · Cardiovascular: No chest pain, dyspnea on exertion, palpitations or loss of consciousness  · Respiratory: No cough or wheezing    · Gastrointestinal: No abdominal pain, appetite loss, blood in stools, constipation, diarrhea or heartburn  · Genitourinary: No dysuria, trouble voiding, or hematuria  · Musculoskeletal:  No gait disturbance, weakness or joint complaints  · Integumentary: No rash or pruritis  · Neurological: No TIA or stroke symptoms  · Psychiatric: No anxiety or depression  · Endocrine: No malaise, fatigue or temperature intolerance  · Hematologic/Lymphatic: No bleeding problems, blood clots or swollen lymph nodes  · Allergic/Immunologic: No nasal congestion or hives  All systems negative except as marked. Objective:  LMP  (LMP Unknown)   Wt Readings from Last 3 Encounters:   01/24/20 99 lb 1.6 oz (45 kg)   12/06/19 106 lb (48.1 kg)   11/21/19 104 lb (47.2 kg)     There is no height or weight on file to calculate BMI. PHYSICAL EXAMINATION:  [ INSTRUCTIONS:  \"[x]\" Indicates a positive item  \"[]\" Indicates a negative item  -- DELETE ALL ITEMS NOT EXAMINED]  Vital Signs: (As obtained by patient/caregiver or practitioner observation)    Blood pressure- 118/68 Heart rate- 80   Respiratory rate- 14   Temperature- afebrile Pulse oximetry-     Constitutional: [x] Appears well-developed and well-nourished [] No apparent distress      [] Abnormal-   Mental status  [x] Alert and awake  [x] Oriented to person/place/time [x]Able to follow commands      Eyes:  EOM    [x]  Normal  [] Abnormal-  Sclera  [x]  Normal  [] Abnormal -         Discharge [x]  None visible  [] Abnormal -    HENT:   [x] Normocephalic, atraumatic.   [] Abnormal   [x] Mouth/Throat: Mucous membranes are moist.     External Ears [x] Normal  [] Abnormal-     Neck: [x] No visualized mass     Pulmonary/Chest: [x] Respiratory effort normal.  [x] No visualized signs of difficulty breathing or respiratory distress        [] Abnormal-      Musculoskeletal:   [x] Normal gait with no signs of ataxia         [x] Normal range of motion of neck        [] Abnormal-       Neurological:        [x] No Facial Asymmetry (Cranial nerve 7 motor function) (limited exam to video visit)          [x] No gaze palsy        [] Abnormal-         Skin:        [x] No significant exanthematous lesions or discoloration noted on facial skin         [] Abnormal-            Psychiatric:       [x] Normal Affect [x] No Hallucinations        [] Abnormal-     Other pertinent observable physical exam findings-     Lab Results   Component Value Date    CKTOTAL 80 03/28/2019    TROPONINI 0.250 04/23/2014     BNP:    Lab Results   Component Value Date    BNP 1,086 04/22/2014     PT/INR:  No results found for: PTINR  No results found for: LABA1C  Lab Results   Component Value Date    CHOL 147 03/29/2019    TRIG 54 03/29/2019    HDL 60 03/29/2019    LDLCALC 114 (H) 05/12/2012    LDLDIRECT 97 03/29/2019     Lab Results   Component Value Date    ALT 12 11/21/2019    AST 30 11/21/2019     TSH:    Lab Results   Component Value Date    TSH 0.06 11/06/2018       Impression:  Kira Norris is a 80 y. o.year old who  has a past medical history of Atrial fibrillation and flutter ([facility] CHF (congestive heart failure) ([facility] Diverticulosis, Enlarged thyroid, GERD (gastroesophageal reflux disease), H/O echocardiogram, H/O subconjunctival hemorrhage, History of CT scan of head, History of echocardiogram, IBS (irritable bowel syndrome), Mitral regurgitation, Osteoarthritis, Osteoporosis, Pacemaker, Palpitations, and Pedal edema. and presents with     Plan:  1. Tachycardia:resolved, it could be PAF, treated with extra digoxin, will go back to 125mcg daily  2.  Recurrent TIA: pacer is MRI safe, back on baby aspirin,her last cat scan did not show any bleed. 3. Cough and shortness of breath: stable  4. LASIX TO CONTINUE  5. PAF: controlled, she has over active thyroid and just started methimazole,OFF midodrine , off eliquis because of subconjuctival hemorrhage  6. Tachycardia:resolved, off  midodrione  7. Epistaxis:resolved  8. PPM:carelinkl set up, her battery life is 4 yrs, AP 10% and  8%  9. CHF and aortic stenosis: recommend DNRAll labs, medications and tests reviewed, continue all other medications of all above medical condition listed as is. Juan Pablo Souza is a 80 y.o. female being evaluated by a Virtual Visit (video visit) encounter to address concerns as mentioned above. A caregiver was present when appropriate. Due to this being a TeleHealth encounter (During [facility]-27 public health emergency), evaluation of the following organ systems was limited: Vitals/Constitutional/EENT/Resp/CV/GI//MS/Neuro/Skin/Heme-Lymph-Imm. Pursuant to the emergency declaration under the [address] [place] [address], [address] [place] [address] authority and the [employer] Act, this Virtual Visit was conducted with patient's (and/or legal guardian's) consent, to reduce the patient's risk of exposure to COVID-19 and provide necessary medical care. The patient (and/or legal guardian) has also been advised to contact this office for worsening conditions or problems, and seek emergency medical treatment and/or call 911 if deemed necessary. Services were provided through a video synchronous discussion virtually to substitute for in-person clinic visit. Patient and provider were located at their individual homes. 1.   I confirm that this visit was completed in a telehealth setting ,using synchronous audiovisual technology for real time patient interaction . The patient identity with name and date of birth was confirmed .  This evaluation of patient was done by telehealth in the setting of BAWTD-15 Public health emergency , which precluded assurance of safe in person visit at the time of service. The patient consented to and accepts potential risks associated with telemedical evaluation and care was taken to assess lan presence of any medical issues that would be more  appropriate for expedited in -person care. Pursuant to the emergency declaration under the [address] [place] [address] waiver authority and the [employer] and [employer] Act, this Virtual  Visit was conducted, with patient's consent, to reduce the patient's risk of exposure to COVID-19 and provide continuity of care for an established patient. Services were provided through a video synchronous discussion virtually to substitute for in-person clinic visit. 2. I Affirm this is a Patient Initiated Episode with an Established Patient who has not had a related appointment within my department in the past 7 days or scheduled within the next 24 hours. --Glenis Chowdary MD on 7/13/2020 at 1:43 PM    An electronic signature was used to authenticate this note.

## 2020-07-13 NOTE — LETTER
Hanna Bartholomew  12/15/1921  [id number]    Have you had any Chest Pain - No      Have you had any Shortness of Breath - No      Have you had any dizziness - No      Have you had any palpitations - Yes  If Yes DO EKG - Do you feel your heart racing  How long does it last - minutes     Is the patient on any of the following medications - none  If Yes DO EKG    Do you have any edema - no    Do you have a surgery or procedure scheduled in the near future - No    Ask patient if they [facility] if they are not already signed [facility]    Check to see if we have an E-MAIL on file for the patient    Check medication list thoroughly!!!  BE SURE TO ASK PATIENT IF THEY NEED MEDICATION REFILLS

## 2020-10-07 NOTE — PROGRESS NOTES
Patient ID: Ava Gregory 12/15/1921    . Chief Complaint   Patient presents with    Fall    Skin Ulcer     right ankle         HPI     Fall: History as per caregiver. Caregiver just removed patient from nursing home. Caregiver has been with the patient at the nursing home about 12 hours/day. The nursing home to take care of her during the night. Caregiver is concerned because it seemed like there always problems when the caregiver was gone. Patient would be in a bed that was soaked with urine. The caregiver is the one who bathe the patient. Caregiver was told that patient fell out of bed once. Caregiver was concerned because patient is really unable to even roll over to be able to fall out of bed. She is not sure how the fall happened. Patient is complaining about back pain. Ulcer: Right ankle: Present for several months. Caregiver is the one who brought her to the attention of the nursing home. She was surprised that they did not know about it. Sacral ulcer: Has been there for a long time. Caregiver and nursing home was aware. Shortness of breath: Caregiver thinks patient might need oxygen. Sometimes she seems to be short of breath. Her pulse ox at home is in the 90s.   Patient does have a history of congestive heart failure    Hypothyroid: Taking her medication as directed        Review of Systems   Unable to perform ROS: Patient nonverbal       Patient Active Problem List   Diagnosis    Pedal edema    Atrial fibrillation and flutter ([address]    CHF (congestive heart failure) ([address]    Mitral regurgitation    Spinal stenosis of lumbar region    Risk for falls    Thyroid nodule    Senile debility    Primary osteoarthritis of both hips    Primary osteoarthritis of right knee    Osteoarthritis of left knee    Osteoarthritis of lumbar spine    TIA (transient ischemic attack)    ICH (intracerebral hemorrhage) ([address]       Past Surgical History:   Procedure Laterality Date    APPENDECTOMY  1980's    with Maureen Hoe BLADDER SURGERY      per old chart had bladder bx and fulgeration done 7/2007    BREAST BIOPSY  1965    left breast - negative for cancer    CARDIOVASCULAR STRESS TEST  11/2007    treadmill    CATARACT REMOVAL  12/2008    Left    CHOLECYSTECTOMY  1980's    with appy    COLONOSCOPY  2010   [facility]    left inguinal    INGUINAL HERNIA REPAIR  08-20-12    Right Side     PACEMAKER INSERTION  03/14/2014    L upper chest// per old chart had 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    CT SONO GUIDE NEEDLE BIOPSY  12/2018    TONSILLECTOMY AND ADENOIDECTOMY  1941    VARICOSE VEIN SURGERY  1960's per old chart    stripping bilat legs       Family History   Problem Relation Age of Onset    High Blood Pressure Sister     Other Sister         sinus problems, vericose veins    Other Father         GI probloems - hx obstruction    Heart Disease Sister         CHF    Diabetes Sister         s/p partial pancreas removal    Early Death Daughter 40        unknown cause    Asthma Son     Cancer Brother         leukemia    Heart Disease Brother         CABG    Early Death Brother         Murdered    Other Brother         dies at 80. unknown cause.  Cancer Brother         throat    Early Death Brother 5        pneumonia       Current Outpatient Medications on File Prior to Visit   Medication Sig Dispense Refill    digoxin (DIGOX) 125 MCG tablet Take 1 tablet by mouth daily 90 tablet 3    methIMAzole (TAPAZOLE) 5 MG tablet Take 1 tablet by mouth daily 90 tablet 3    furosemide (LASIX) 20 MG tablet Take 1 tablet by mouth daily 90 tablet 3    Multiple Vitamin (MULTI-VITAMIN DAILY PO) Take 1 tablet by mouth daily       No current facility-administered medications on file prior to visit. Objective:         Physical Exam  Vitals signs and nursing note reviewed. Constitutional:       Appearance: She is underweight. Comments: Elderly, debilitated, unable to transfer from wheelchair to bed without assistance. Caregiver has to do a lot of work to transfer the patient to the bed. Groaning when she is moved or if leg is touched   HENT:      Head: Normocephalic and atraumatic. Neck:      Musculoskeletal: Neck supple. Cardiovascular:      Rate and Rhythm: Normal rate and regular rhythm. Heart sounds: Normal heart sounds, S1 normal and S2 normal.   Pulmonary:      Effort: Pulmonary effort is normal. No respiratory distress. Breath sounds: Normal breath sounds. No wheezing. Musculoskeletal:        Back:       Right lower leg: No edema. Left lower leg: No edema. Feet:    Skin:     General: Skin is warm and dry. Psychiatric:         Speech: She is noncommunicative. Behavior: Behavior is slowed. Vitals:    10/07/20 1119   BP: 116/70   Pulse: 107   Temp: 97.8 °F (36.6 °C)   TempSrc: Temporal   SpO2: 96%   Weight: 96 lb 3.2 oz (43.6 kg)   Height: 5' 1\" (1.549 m)     Body mass index is 18.18 kg/m². Wt Readings from Last 3 Encounters:   10/07/20 96 lb 3.2 oz (43.6 kg)   01/24/20 99 lb 1.6 oz (45 kg)   12/06/19 106 lb (48.1 kg)     BP Readings from Last 3 Encounters:   10/07/20 116/70   01/24/20 (!) 100/56   12/06/19 110/60          No results found for this visit on 10/07/20. The ASCVD Risk score (Shavon Math., et al., 2013) failed to calculate for the following reasons: The 2013 ASCVD risk score is only valid for ages 36 to 78  Lab Review   No visits with results within 2 Month(s) from this visit.    Latest known visit with results is:   Abstract on 02/14/2020   Component Date Value    WBC 01/15/2020 8.0     Hemoglobin 01/15/2020 13.2     Hematocrit 01/15/2020 40.2     Platelets 15/81/0774 189     Neutrophils % 01/15/2020 71.1     Lymphocytes % 01/15/2020 17.3     Monocytes % 01/15/2020 7.4     Eosinophils % 01/15/2020 3.1     Basophils % 01/15/2020 1.1     Neutrophils

## 2020-10-07 NOTE — PATIENT INSTRUCTIONS
Early voting starts October sixth. VOTE VOTE VOTE VOTE VOTE VOTE        PLEASE BRING YOUR MEDICATIONS TO ALL APPOINTMENTS    The diagnoses and medications listed in this after visit summary may not be accurate at the time of check out. Please check MY CHART in 28-48 hours for possible corrections. Late cancellation policy: So that we can better accommodate people who are sick, please give our office 24 hour notice for an appointment cancellation. Thank you. Missed appointments: Your care is very important to us. It is important that you keep your scheduled appointments. Multiple missed appointments will lead to a dismissal from the office. Later arrival policy: If you are more than 10 minutes late for your appointment, you will be asked to reschedule. Please allow 5-7 business days for paperwork to be processed. It is important that you check your MY Chart messages, as they include appointment reminders, test results, and other important information. If you have forgotten your password, please call [phone].

## 2020-10-07 NOTE — PROGRESS NOTES
 Cancer Brother         throat    Early Death Brother 5        pneumonia       Current Outpatient Medications on File Prior to Visit   Medication Sig Dispense Refill    digoxin (DIGOX) 125 MCG tablet Take 1 tablet by mouth daily 90 tablet 3    methIMAzole (TAPAZOLE) 5 MG tablet Take 1 tablet by mouth daily 90 tablet 3    furosemide (LASIX) 20 MG tablet Take 1 tablet by mouth daily 90 tablet 3    Multiple Vitamin (MULTI-VITAMIN DAILY PO) Take 1 tablet by mouth daily       No current facility-administered medications on file prior to visit. Objective:       ***    Physical Exam  Vitals:    10/07/20 1119   BP: 116/70   Pulse: 107   Temp: 97.8 °F (36.6 °C)   TempSrc: Temporal   SpO2: 96%   Weight: 96 lb 3.2 oz (43.6 kg)   Height: 5' 1\" (1.549 m)     Body mass index is 18.18 kg/m². Wt Readings from Last 3 Encounters:   10/07/20 96 lb 3.2 oz (43.6 kg)   01/24/20 99 lb 1.6 oz (45 kg)   12/06/19 106 lb (48.1 kg)     BP Readings from Last 3 Encounters:   10/07/20 116/70   01/24/20 (!) 100/56   12/06/19 110/60          No results found for this visit on 10/07/20. Assessment:       Diagnosis Orders   1. Needs flu shot  INFLUENZA, QUADV, ADJUVANTED, 65 YRS =, IM, PF, PREFILL SYR, 0.5ML (FLUAD)           Plan:      ***    No follow-ups on file.

## 2020-10-07 NOTE — Clinical Note
Let patient caregiver know that i'm ordering blood flow study to check out her right lower leg. She might have low blood flow to the leg which is causing the ulcer on the ankle.

## 2020-10-12 NOTE — TELEPHONE ENCOUNTER
Honey from [facility] MARTIR Walls [facility] left a VM asking for a OK verbal to sign home care orders for patient.   Please advise

## 2020-10-13 PROBLEM — L97.312 ULCER OF RIGHT ANKLE, WITH FAT LAYER EXPOSED (HCC): Status: ACTIVE | Noted: 2020-01-01

## 2020-10-13 PROBLEM — I73.9 PVD (PERIPHERAL VASCULAR DISEASE) (HCC): Status: ACTIVE | Noted: 2020-01-01

## 2020-10-13 NOTE — PROGRESS NOTES
moderate severity; Tricuspid Regurgitation: mild severity    Osteoarthritis     primarily affecting the neck, fingers and knees    Osteoporosis     Pacemaker     Palpitations     Pedal edema        PAST SURGICAL HISTORY    Past Surgical History:   Procedure Laterality Date    APPENDECTOMY  18's    with choley    BLADDER SURGERY      per old chart had bladder bx and fulgeration done 7/2007    BREAST BIOPSY  1965    left breast - negative for cancer    CARDIOVASCULAR STRESS TEST  11/2007    treadmill    CATARACT REMOVAL  12/2008    Left    CHOLECYSTECTOMY  1980's    with appy    COLONOSCOPY  2010   [facility]    left inguinal    INGUINAL HERNIA REPAIR  08-20-12    Right Side     PACEMAKER INSERTION  03/14/2014    L upper chest// per old chart had 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    AK SONO GUIDE NEEDLE BIOPSY  12/2018    TONSILLECTOMY AND ADENOIDECTOMY  1941    VARICOSE VEIN SURGERY  1960's per old chart    stripping bilat legs       FAMILY HISTORY    Family History   Problem Relation Age of Onset    High Blood Pressure Sister     Other Sister         sinus problems, vericose veins    Other Father         GI probloems - hx obstruction    Heart Disease Sister         CHF    Diabetes Sister         s/p partial pancreas removal    Early Death Daughter 40        unknown cause    Asthma Son     Cancer Brother         leukemia    Heart Disease Brother         CABG    Early Death Brother         Murdered    Other Brother         dies at 80. unknown cause.     Cancer Brother         throat    Early Death Brother 5        pneumonia       SOCIAL HISTORY    Social History     Tobacco Use    Smoking status: Never Smoker    Smokeless tobacco: Never Used   Substance Use Topics    Alcohol use: No    Drug use: No       ALLERGIES    Allergies   Allergen Reactions    Dairycare [Lactase-Lactobacillus]     Lactose     Milk-Related Compounds MEDICATIONS    Current Outpatient Medications on File Prior to Encounter   Medication Sig Dispense Refill    Nutritional Supplements (ENSURE HIGH PROTEIN) LIQD 3 times per day 90 Can 11    digoxin (DIGOX) 125 MCG tablet Take 1 tablet by mouth daily 90 tablet 3    methIMAzole (TAPAZOLE) 5 MG tablet Take 1 tablet by mouth daily 90 tablet 3    furosemide (LASIX) 20 MG tablet Take 1 tablet by mouth daily 90 tablet 3    Multiple Vitamin (MULTI-VITAMIN DAILY PO) Take 1 tablet by mouth daily       No current facility-administered medications on file prior to encounter.         PROBLEM LIST    Patient Active Problem List   Diagnosis    Pedal edema    Atrial fibrillation and flutter (HCC)    CHF (congestive heart failure) (HCC)    Mitral regurgitation    Spinal stenosis of lumbar region    Risk for falls    Thyroid nodule    Senile debility    Primary osteoarthritis of both hips    Primary osteoarthritis of right knee    Osteoarthritis of left knee    Osteoarthritis of lumbar spine    TIA (transient ischemic attack)    [facility]racere[facility]) [facility] [address]    WD-Ulcer of right ankle, with fat layer exposed ([facility]       REVIEW OF SYSTEMS    Constitutional: negative for anorexia, chills, fatigue, fevers, malaise, sweats and weight loss  Respiratory: negative for cough, hemoptysis, shortness of breath and wheezing  Cardiovascular: negative for chest pain, fatigue, palpitations, syncope and tachypnea  Integument/breast: positive for skin lesion(s)      Objective:      BP (!) 148/78   Pulse 88   Temp 98.2 °F (36.8 °C) (Temporal)   Resp 15   LMP  (LMP Unknown)     PHYSICAL EXAM  General Appearance: alert and oriented to person, place and time, well-developed and well-nourished, in no acute distress  Pulmonary/Chest: clear to auscultation bilaterally- no wheezes, rales or rhonchi, normal air movement, no respiratory distress  Cardiovascular: normal rate, normal S1 and S2, no gallops, intact distal pulses and no carotid bruits  Dermatologic exam: Visual inspection of the periwound reveals the skin to be dry, cool , clammy and scaly  Wound exam: see wound description below in procedure note      Assessment:       Darling Sutton  appears to have a non-healing wound of the right lateral ankle. The etiology of the wound is felt to be arterial. There are multiple complicating factors including arterial insufficiency. A comprehensive wound management program would be helpful to heal this wound. Assessments completed include fall risk and nutritional, functional,and psychological status. At this time appropriate care would include: periodic debridement and wound care as below. Problem List Items Addressed This Visit     WD-Ulcer of right ankle, with fat layer exposed ([address]          Wound 10/13/20 Ankle Right right ankle (Active)   Wound Image   10/13/20 1328   Dressing Status New dressing applied;Clean;Dry; Intact 10/13/20 1421   Wound Cleansed Irrigated with saline 10/13/20 1328   Wound Length (cm) 0.5 cm 10/13/20 1328   Wound Width (cm) 0.7 cm 10/13/20 1328   Wound Depth (cm) 0.2 cm 10/13/20 1328   Wound Surface Area (cm^2) 0.35 cm^2 10/13/20 1328   Wound Volume (cm^3) 0.07 cm^3 10/13/20 1328   Distance Tunneling (cm) 0 cm 10/13/20 1328   Tunneling Position ___ O'Clock 0 10/13/20 1328   Undermining Starts ___ O'Clock 0 10/13/20 1328   Undermining Ends___ O'Clock 0 10/13/20 1328   Undermining Maxium Distance (cm) 0 10/13/20 1328   Wound Assessment Slough 10/13/20 1328   Drainage Amount Small 10/13/20 1328   Drainage Description Clear 10/13/20 1328   Odor None 10/13/20 1328   Number of days: 0       The patient's toe nails were trimmed today, all 10 nails were trimmed using rongeurs. Medical necessity includes advanced atrophic changes to include decrease hair growth lower legs, thickening, discolored toenails and rubor. L60.2 Patient primary MD is Dr. Chino Francisco, and her last office visit was 10-7-2020.      Patient is new to our clinic. She was recently taken out of [email or web address] at the end of September of this year by her daughter, who is present for exam. Patient is non-verbal and in a wheelchair. Patient has an appointment for an XR at imaging center tomorrow and arterial study ordered by her PCP. Culture obtained. We will add bactroban and gentamycin to wound bed for now. We will top this with an alginate and an ABD. We are emphasizing taking pressure off this point, as it seems patient tends to favor this side when sitting and laying. Patient family educated on daily changes. Plan:     Discharge instructions:    Discharge Instructions       PHYSICIAN ORDERS AND DISCHARGE INSTRUCTIONS    NOTE: Upon discharge from the [address], you will receive a patient experience survey. We would be grateful if you would take the time to fill this survey out. Wound care order history:     Initial Visit: 10/13/20  ALANNA's   Right       Left    Date:   Imaging:   Cultures:     Antibiotics:        Grafts:     Continuing wound care orders and information:                Residence:  Private residence              Continue home health care with:    Your wound-care supplies will be provided by: Wound cleansing:     Do not scrub or use excessive force. Wash hands with soap and water before and after dressing changes. Prior to applying a clean dressing, cleanse wound with normal saline, wound cleanser, or mild soap and water. Ask the physician or nurse before getting the wound(s) wet in a shower    Daily Wound management:   Keep weight off wounds and reposition every 2 hours. Avoid standing for long periods of time. Apply wraps/stockings in AM and remove at bedtime. If swelling is present, elevate legs to the level of the heart or above for 30 minutes 4-5 times a day and/or when sitting. When taking antibiotics take entire prescription as ordered by physician do not stop taking until medicine is all gone. Orders for this week:10/13/20  Paint toes with betadine     Right Ankle:Wash with soap and water. Pat dry. Apply bactroban and gentamycin to wound bed  Cover with Ca alginate and ABD  Wrap with AMD conform/ kerlix and Ace bandage  Secure With Tape    Change Daily    Cultures sent on 10/13/20  [facility] has ordered Arterial Studies. Please call Central Scheduling at [phone] to schedule appointment. Follow up with Sary Gaines In  1 weeks in the wound care center   Stretcher Visit  Call  for any questions or concerns.   Date__________   Time____________          Treatment Note Wound 10/13/20 Ankle Right right ankle-Dressing/Treatment: (bactroban, gent, calcium algiante, abd, conform tape )    Written Patient Dismissal Instructions Given            Electronically signed by LIANA Mireles CNP on 10/13/2020 at 2:25 PM

## 2020-10-14 PROBLEM — I82.411 ACUTE DEEP VEIN THROMBOSIS (DVT) OF FEMORAL VEIN OF RIGHT LOWER EXTREMITY (HCC): Status: ACTIVE | Noted: 2020-01-01

## 2020-10-19 PROBLEM — F05 VASCULAR DEMENTIA WITH DELIRIUM (HCC): Status: ACTIVE | Noted: 2020-01-01

## 2020-10-19 PROBLEM — F01.50 VASCULAR DEMENTIA WITH DELIRIUM (HCC): Status: ACTIVE | Noted: 2020-01-01

## 2020-10-19 NOTE — PATIENT INSTRUCTIONS
Early voting starts October sixth. VOTE VOTE VOTE VOTE VOTE VOTE        PLEASE BRING YOUR MEDICATIONS TO ALL APPOINTMENTS    The diagnoses and medications listed in this after visit summary may not be accurate at the time of check out. Please check MY CHART in 28-48 hours for possible corrections. Late cancellation policy: So that we can better accommodate people who are sick, please give our office 24 hour notice for an appointment cancellation. Thank you. Missed appointments: Your care is very important to us. It is important that you keep your scheduled appointments. Multiple missed appointments will lead to a dismissal from the office. Later arrival policy: If you are more than 10 minutes late for your appointment, you will be asked to reschedule. Please allow 5-7 business days for paperwork to be processed. It is important that you check your MY Chart messages, as they include appointment reminders, test results, and other important information. If you have forgotten your password, please call [phone].

## 2020-10-19 NOTE — PROGRESS NOTES
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    NC SONO GUIDE NEEDLE BIOPSY  12/2018    TONSILLECTOMY AND ADENOIDECTOMY  1941    VARICOSE VEIN SURGERY  1960's per old chart    stripping bilat legs       Family History   Problem Relation Age of Onset    High Blood Pressure Sister     Other Sister         sinus problems, vericose veins    Other Father         GI probloems - hx obstruction    Heart Disease Sister         CHF    Diabetes Sister         s/p partial pancreas removal    Early Death Daughter 40        unknown cause    Asthma Son     Cancer Brother         leukemia    Heart Disease Brother         CABG    Early Death Brother         Murdered    Other Brother         dies at 80. unknown cause.  Cancer Brother         throat    Early Death Brother 5        pneumonia       Current Outpatient Medications on File Prior to Visit   Medication Sig Dispense Refill    Nutritional Supplements (ENSURE HIGH PROTEIN) LIQD 3 times per day 90 Can 11    digoxin (DIGOX) 125 MCG tablet Take 1 tablet by mouth daily 90 tablet 3    methIMAzole (TAPAZOLE) 5 MG tablet Take 1 tablet by mouth daily 90 tablet 3    furosemide (LASIX) 20 MG tablet Take 1 tablet by mouth daily 90 tablet 3    Multiple Vitamin (MULTI-VITAMIN DAILY PO) Take 1 tablet by mouth daily       No current facility-administered medications on file prior to visit. Objective:           Physical Exam  Constitutional:       Comments: Sitting in wheelchair, leaning to the right.   Not speaking but frequently touching caregiver   Musculoskeletal:      Right ankle: She exhibits swelling (much less than before). Right lower leg: She exhibits no tenderness and no swelling. No edema. Legs:       Comments: Ulcer diameter appears to be about 3-4 mm          1. Findings consistent with multifocal arterial disease of the right lower    extremity runoff, without evidence of a focal hemodynamically significant    stenosis.  Consider further evaluation with ABIs and/or CTA. 2. Occlusive DVT in the right femoral and popliteal veins. 3. The left posterior tibial and peroneal arteries were not visualized    secondary to limitations in scanning due to the patient's condition. However, no evidence of a significant stenosis of the visualized left lower    extremity runoff. Results of this examination were communicated to Matias Pollack MA at the ordering    physician's office at 2:32 p.m. on 10/14/2020 by the Jonah Holcomb. Vitals:    10/19/20 1531   BP: 116/70   Site: Left Upper Arm   Position: Sitting   Cuff Size: Medium Adult   Pulse: 65   Temp: 97.7 °F (36.5 °C)   TempSrc: Infrared   SpO2: 97%     There is no height or weight on file to calculate BMI. Wt Readings from Last 3 Encounters:   10/27/20 96 lb (43.5 kg)   10/07/20 96 lb 3.2 oz (43.6 kg)   01/24/20 99 lb 1.6 oz (45 kg)     BP Readings from Last 3 Encounters:   10/27/20 107/70   10/27/20 110/70   10/19/20 116/70          No results found for this visit on 10/19/20. Assessment:       Diagnosis Orders   1. Acute deep vein thrombosis (DVT) of femoral vein of right lower extremity (HCC)  DISCONTINUED: apixaban (ELIQUIS) 5 MG TABS tablet   2. PAD (peripheral artery disease) ([facility])  VL ANKLE ART BRACHIAL INDICES EXTREMITY BILATERAL   3. Chronic ulcer of right ankle, unspecified ulcer stage ([facility] [address]     4. Senile debility  UNABLE TO FIND           Plan:      See orders. Eliquis dose increased based on dosing criteria   Glad to see leg and ulcer improving so quickly. Recommend leg elevation  Warned caregiver of side effects of Eliquis includingGI and brain bleeding. She understands. Will have her take this for 3 months. Return if symptoms worsen or fail to improve.

## 2020-10-20 NOTE — FLOWSHEET NOTE
Wound care  Cancellation/No-show Note  Patient Name:  Avel Nicholas  :  12/15/1921   Date:  10/20/2020  Cancelled visits to date: 0  No-shows to date: 0    For today's appointment patient:  [x]  Cancelled  []  Rescheduled appointment  []  No-show     Reason given by patient:  [x]  Patient ill  []  Conflicting appointment  []  No transportation    []  Conflict with work  []  No reason given  []  Other:     Comments:      Electronically signed by:  Sunday Noel, 10/20/2020, 11:15 AM

## 2020-10-27 NOTE — LETTER
Kenny Schultz [facility]  12/15/1921  [id number]    Have you had any Chest Pain - No    Have you had any Shortness of Breath - Yes  If Yes - When at rest and on exertion    Have you had any dizziness - No    Have you had any palpitations - No    Is the patient on any of the following medications -   If Yes DO EKG    Do you have any edema - swelling in no      Check Venous \"LEG PROBLEM Questionnaire\"    Do you have a surgery or procedure scheduled in the near future - No  If Yes- DO EKG    Ask patient if they want to sign up for MyChart if they are not already signed up    Check to see if we have an E-MAIL on file for the patient    Check medication list thoroughly!!!  BE SURE TO ASK PATIENT IF THEY NEED MEDICATION REFILLS    At check out add to every patient's \"wrap up\" the following dot phrase AFTERHOURSEDUCATION and ensure we explain this to our patients

## 2020-10-27 NOTE — PROGRESS NOTES
[id number] Score for Atrial Fibrillation Stroke Risk   Risk   Factors  Component Value   C CHF Yes 1   H HTN No 0   A2 Age >= 76 Yes,  (80 y.o.) 2   D DM No 0   S2 Prior Stroke/TIA Yes 2   V Vascular Disease No 0   A Age 74-69 No,  (80 y.o.) 0   Sc Sex female 1    [id number]  Score  6   Score last updated 10/27/20 4:30 PM EDT    Click here for a link to the UpToDate guideline \"Atrial Fibrillation: Anticoagulation therapy to prevent embolization    Disclaimer: Risk Score calculation is dependent on accuracy of patient problem list and past encounter diagnosis.

## 2020-10-27 NOTE — PROGRESS NOTES
[facility] Progress Note With Procedure    Uzma Hastings  AGE: 80 y.o. GENDER: female  : 12/15/1921  EPISODE DATE:  10/27/2020     Subjective:     Chief Complaint   Patient presents with    Wound Check     right ankle          HISTORY of PRESENT ILLNESS      Michelle Hastings is a 80 y.o. 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  The condition is of moderate severity. The ulcer has been present since 2020. The underlying cause is thought to be arterial insufficiency. The patients care to date has included pressure offloading. The patient has significant underlying medical conditions as below.      Patient is not a diabetic, and she is not on any blood thinners. She is not a smoker. Patient is non-verbal and is unable to provide detailed subjective information or history. She is chronically in a wheelchair and has chronic, generalized decreased mobility. Patient family also reports an lesion on the coccyx, but daughter believes this has healed.      Wound Pain Timing/Severity: waxing and waning  Quality of pain: N/A  Severity of pain:  Patient non-verbal  Modifying Factors: chronic pressure, decreased mobility, shear force and arterial insufficiency  Associated Signs/Symptoms: pain        PAST MEDICAL HISTORY        Diagnosis Date    Atrial fibrillation and flutter (HCC)     CHF (congestive heart failure) (HCC)     Diverticulosis     Enlarged thyroid     GERD (gastroesophageal reflux disease)     H/O echocardiogram 2017    EF 60%     H/O subconjunctival hemorrhage     History of CT scan of head 2018    No acute intracranial abnormality. Diffuse atrophic changes with findings suggesting chronic microvascular ischemia.     History of echocardiogram 2016    EF60%-moderate AS, moderate MR, MILD AI, enlarged LA, MODERATE TR    IBS (irritable bowel syndrome)     Mitral regurgitation     moderate severity; Tricuspid Regurgitation: mild severity    Osteoarthritis     primarily affecting the neck, fingers and knees    Osteoporosis     Pacemaker     Palpitations     Pedal edema        PAST SURGICAL HISTORY    Past Surgical History:   Procedure Laterality Date    APPENDECTOMY  18's    with choley    BLADDER SURGERY      per old chart had bladder bx and fulgeration done 7/2007    BREAST BIOPSY  1965    left breast - negative for cancer    CARDIOVASCULAR STRESS TEST  11/2007    treadmill    CATARACT REMOVAL  12/2008    Left    CHOLECYSTECTOMY  1980's    with appy    COLONOSCOPY  2010   [address]    left inguinal    INGUINAL HERNIA REPAIR  08-20-12    Right Side     PACEMAKER INSERTION  03/14/2014    L upper chest// per old chart had 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    DC SONO GUIDE NEEDLE BIOPSY  12/2018    TONSILLECTOMY AND ADENOIDECTOMY  1941    VARICOSE VEIN SURGERY  1960's per old chart    stripping bilat legs       FAMILY HISTORY    Family History   Problem Relation Age of Onset    High Blood Pressure Sister     Other Sister         sinus problems, vericose veins    Other Father         GI probloems - hx obstruction    Heart Disease Sister         CHF    Diabetes Sister         s/p partial pancreas removal    Early Death Daughter 40        unknown cause    Asthma Son     Cancer Brother         leukemia    Heart Disease Brother         CABG    Early Death Brother         Murdered    Other Brother         dies at 80. unknown cause.     Cancer Brother         throat    Early Death Brother 5        pneumonia       SOCIAL HISTORY    Social History     Tobacco Use    Smoking status: Never Smoker    Smokeless tobacco: Never Used   Substance Use Topics    Alcohol use: No    Drug use: No       ALLERGIES    Allergies   Allergen Reactions    Dairycare [Lactase-Lactobacillus]     Lactose     Milk-Related Compounds        MEDICATIONS    Current Outpatient Medications on File Prior to Encounter   Medication Sig Dispense Refill    aspirin EC 81 MG EC tablet Take 1 tablet by mouth daily 90 tablet 1    Nutritional Supplements (ENSURE HIGH PROTEIN) LIQD 3 times per day 90 Can 11    digoxin (DIGOX) 125 MCG tablet Take 1 tablet by mouth daily 90 tablet 3    methIMAzole (TAPAZOLE) 5 MG tablet Take 1 tablet by mouth daily 90 tablet 3    furosemide (LASIX) 20 MG tablet Take 1 tablet by mouth daily 90 tablet 3    Multiple Vitamin (MULTI-VITAMIN DAILY PO) Take 1 tablet by mouth daily       No current facility-administered medications on file prior to encounter. REVIEW OF SYSTEMS    Pertinent items are noted in HPI. Constitutional: Negative for systemic symptoms including fever, chills and malaise. Objective:      /70   Pulse 68   Temp 98.2 °F (36.8 °C) (Temporal)   Resp 16   LMP  (LMP Unknown)     PHYSICAL EXAM      General: The patient is in no acute distress. Mental status:  Patient is appropriate, is  oriented to place and plan of care.   Dermatologic exam: Visual inspection of the periwound reveals the skin to be dry, clammy and scaly  Wound exam: see wound description below in procedure note      Assessment:     Problem List Items Addressed This Visit     WD-Ulcer of right ankle, with fat layer exposed ([address] - Primary    Relevant Medications    lidocaine (XYLOCAINE) 4 % external solution (Start on 10/27/2020  4:00 PM)    Other Relevant Orders    Supply: Wound Cleanser    WD-PVD (peripheral vascular disease) ([address]    Relevant Medications    lidocaine (XYLOCAINE) 4 % external solution (Start on 10/27/2020  4:00 PM)    Other Relevant Orders    Supply: Wound Cleanser        Procedure Note    Indications:  Based on my examination of this patient's wound(s) today, sharp excision into necrotic subcutaneous tissue is required to promote healing and evaluate the extent of previous healing. Performed by: LIANA Madden CNP    Consent obtained: Yes    Time out taken:  Yes    Pain Control: N/A       Debridement:Excisional Debridement    Using curette the wound(s) was/were sharply debrided down through and including the removal of subcutaneous tissue. Devitalized Tissue Debrided:  fibrin, slough and callus    Pre Debridement Measurements:  Are located in the Wound Documentation Flow Sheet    All active wounds listed below with today's date are evaluated  Wound(s)    debrided this date include # : 1     Post  Debridement Measurements:  Wound 10/13/20 Ankle Right right ankle (Active)   Wound Image   10/13/20 1328   Dressing Status New dressing applied;Clean;Dry; Intact 10/13/20 1421   Wound Cleansed Irrigated with saline 10/27/20 1522   Wound Length (cm) 0.2 cm 10/27/20 1522   Wound Width (cm) 0.3 cm 10/27/20 1522   Wound Depth (cm) 0.1 cm 10/27/20 1522   Wound Surface Area (cm^2) 0.06 cm^2 10/27/20 1522   Change in Wound Size % (l*w) 82.86 10/27/20 1522   Wound Volume (cm^3) 0.01 cm^3 10/27/20 1522   Wound Healing % 86 10/27/20 1522   Distance Tunneling (cm) 0 cm 10/27/20 1522   Tunneling Position ___ O'Clock 0 10/27/20 1522   Undermining Starts ___ O'Clock 0 10/27/20 1522   Undermining Ends___ O'Clock 0 10/27/20 1522   Undermining Maxium Distance (cm) 0 10/27/20 1522   Wound Assessment Pale granulation tissue 10/27/20 1522   Drainage Amount Small 10/27/20 1522   Drainage Description Serosanguinous 10/27/20 1522   Odor None 10/27/20 1522   Vida-wound Assessment Dry/flaky 10/27/20 1522   Margins Epibole (rolled edges) 10/27/20 1522   Wound Thickness Description not for Pressure Injury Full thickness 10/27/20 1522   Number of days: 14       Percent of Wound(s) Debrided: approximately 100%    Total  Area  Debrided:  0.06 sq cm     Bleeding:  Minimal    Hemostasis Achieved:  by pressure    Procedural Pain:  2  / 10     Post Procedural Pain:  0 / 10     Response to treatment:  Well tolerated by patient. Status of wound progress and description from last visit:   Improving. Patient was diagnosed with and treated for a right lower leg DVT based on the scan we ordered at initial visit. She was treated with short term eliquis and has now been placed on baby Asprin only. Her right leg swelling is significantly improved and her right lateral ankle wound is healing well. We will continue treatment plan and are agreeable to seeing her in 2 weeks based on her age and difficulty traveling. We visualized a small, closed, pink area on her coccyx today. Patient caretaker has been treating this with soap and water as well as foam, and states that she is turned q2 hours. We are ok with this and at this time do not believe this area requires further interventions. We will continue to monitor.        Plan:           Treatment Note      Written Patient Dismissal Instructions Given            Electronically signed by LIANA Mckenzie CNP on 10/27/2020 at 3:47 PM

## 2020-10-27 NOTE — PROGRESS NOTES
James Lewis MD        OFFICE  FOLLOWUP NOTE    Chief complaints: patient is here for management of CHF, PPM,AFIB, DIZZINESS, EPISTAXIS, possible subarachnoid hemorrhage or cortical petechial hemorrhage, recurrent TIA    Subjective: patient feels better, no chest pain, no shortness of breath, no dizziness, no palpitations    HPI Xochitl Cameron is a 80 y. o.year old who  has a past medical history of Atrial fibrillation and flutter ([facility] CHF (congestive heart failure) ([facility] Diverticulosis, Enlarged thyroid, GERD (gastroesophageal reflux disease), H/O echocardiogram, H/O subconjunctival hemorrhage, History of CT scan of head, History of echocardiogram, IBS (irritable bowel syndrome), Mitral regurgitation, Osteoarthritis, Osteoporosis, Pacemaker, Palpitations, and Pedal edema. and presents for management of CHF, PPM,AFIB, DIZZINESS, EPISTAXIS, possible subarachnoid hemorrhage or cortical petechial hemorrhage, recurrent TIA which are well controlled  She had arterial doppler and had somehow read occlusive DVT on rt popliteal vein and rt femoral vein. She had history of subarachnoid hemorrhage in past and had recent bleeding noticed in the tongue. She had arterial doppler which showed monophasic wave form in legs and also had rt ankle sore  Current Outpatient Medications   Medication Sig Dispense Refill    apixaban (ELIQUIS) 5 MG TABS tablet Take 1 tablet by mouth 2 times daily Cancel the 2.5 mg pill refills 180 tablet 0    Nutritional Supplements (ENSURE HIGH PROTEIN) LIQD 3 times per day 90 Can 11    digoxin (DIGOX) 125 MCG tablet Take 1 tablet by mouth daily 90 tablet 3    methIMAzole (TAPAZOLE) 5 MG tablet Take 1 tablet by mouth daily 90 tablet 3    furosemide (LASIX) 20 MG tablet Take 1 tablet by mouth daily 90 tablet 3    Multiple Vitamin (MULTI-VITAMIN DAILY PO) Take 1 tablet by mouth daily       No current facility-administered medications for this visit.       Allergies: Dairycare pancreas removal    Early Death Daughter 40        unknown cause    Asthma Son     Cancer Brother         leukemia    Heart Disease Brother         CABG    Early Death Brother         Murdered    Other Brother         dies at 80. unknown cause.  Cancer Brother         throat    Early Death Brother 5        pneumonia     Social History     Tobacco Use    Smoking status: Never Smoker    Smokeless tobacco: Never Used   Substance Use Topics    Alcohol use: No      [unfilled]  Review of Systems:   · Constitutional: No Fever or Weight Loss   · Eyes: No Decreased Vision  · ENT: No Headaches, Hearing Loss or Vertigo  · Cardiovascular: No chest pain, dyspnea on exertion, palpitations or loss of consciousness  · Respiratory: No cough or wheezing    · Gastrointestinal: No abdominal pain, appetite loss, blood in stools, constipation, diarrhea or heartburn  · Genitourinary: No dysuria, trouble voiding, or hematuria  · Musculoskeletal:  No gait disturbance, weakness or joint complaints  · Integumentary: No rash or pruritis  · Neurological: No TIA or stroke symptoms  · Psychiatric: No anxiety or depression  · Endocrine: No malaise, fatigue or temperature intolerance  · Hematologic/Lymphatic: No bleeding problems, blood clots or swollen lymph nodes  · Allergic/Immunologic: No nasal congestion or hives  All systems negative except as marked. Objective:  /70   Pulse 68   Ht 5' 1\" (1.549 m)   Wt 96 lb (43.5 kg)   LMP  (LMP Unknown)   BMI 18.14 kg/m²   Wt Readings from Last 3 Encounters:   10/27/20 96 lb (43.5 kg)   10/07/20 96 lb 3.2 oz (43.6 kg)   01/24/20 99 lb 1.6 oz (45 kg)     Body mass index is 18.14 kg/m².   GENERAL - Alert, oriented, pleasant, in no apparent distress,normal grooming  HEENT - pupils are reactive to light and accomodation, cornea intact, conjunctive normal color, ears are normal in exam,throat exam in normal, teeth, gum and palate are normal, oral mucosa is normal without any notation of pallor or cyanosis  Neck - Supple. No jugular venous distention noted. No carotid bruits, no apical -carotid delay  Respiratory:  Normal breath sounds, No respiratory distress, No wheezing, No chest tenderness. ,no use of accessory muscles, diaphragm movement is normal  Cardiovascular: (PMI) apex non displaced,no lifts no thrills, no s3,no s4, Normal heart rate, Normal rhythm, No murmurs, No rubs, No gallops. Carotid arteries pulse and amplitude are normal no bruit, no abdominal bruit noted ( normal abdominal aorta ausculation), femoral arteries pulse and amplitude are normal no bruit, pedal pulses are normal  Femoral pulses have normal amplitude, no bruits   Extremities - No cyanosis, clubbing, or significant edema, no varicose veins    Abdomen - No masses, tenderness, or organomegaly, no hepato-splenomegally, no bruits  Musculoskeletal rt ankle soreness, no kyphosis or scoliosis, no deformity in any extremity noted, muscle strength and tone are normal  Skin: no ulcer,no scar,no stasis dermatitis   Neurologic - alert oriented times 3,Cranial nerves II through XII are grossly intact. There were no gross focal neurologic abnormalities. All sensory and motor nerves examined and were normal  Psychiatric: normal mood and affect    Lab Results   Component Value Date    CKTOTAL 80 03/28/2019    TROPONINI 0.250 04/23/2014     BNP:    Lab Results   Component Value Date    BNP 1,086 04/22/2014     PT/INR:  No results found for: PTINR  No results found for: LABA1C  Lab Results   Component Value Date    CHOL 147 03/29/2019    TRIG 54 03/29/2019    HDL 60 03/29/2019    LDLCALC 114 (H) 05/12/2012    LDLDIRECT 97 03/29/2019     Lab Results   Component Value Date    ALT 8 (L) 10/07/2020    AST 19 10/07/2020     TSH:    Lab Results   Component Value Date    TSH 0.06 11/06/2018       Impression:  Ruben King is a 80 y. o.year old who  has a past medical history of Atrial fibrillation and flutter ([address] CHF (congestive heart failure) ([address] Diverticulosis, Enlarged thyroid, GERD (gastroesophageal reflux disease), H/O echocardiogram, H/O subconjunctival hemorrhage, History of CT scan of head, History of echocardiogram, IBS (irritable bowel syndrome), Mitral regurgitation, Osteoarthritis, Osteoporosis, Pacemaker, Palpitations, and Pedal edema. and presents with     Plan:  1. Rt leg DVT: repeat venous doppler of rt leg, will d.heri eliquis, risk of bleeding in brain in more than benefit, she already had subarachnoid hemorrhage in past and his very week and risk of fall is quite great. in past also had subconjunctival hemorrhage, add baby aspirn  2. Rt leg sore: arterial doppler reviewed, she had rt ankle bandage  3. Tachycardia:resolved, it could be PAF, treated with extra digoxin, will go back to 125mcg daily  4. Recurrent TIA: pacer is MRI safe, back on baby aspirin,her last cat scan did not show any bleed. 5. Cough and shortness of breath: stable  6. LASIX TO CONTINUE  7. PAF: controlled, she has over active thyroid and just started methimazole,OFF midodrine , stop eliquis because of subconjuctival hemorrhage in past  8. Tachycardia:resolved, off  midodrione  9. Epistaxis:resolved  10. PPM:carelinkl set up, her battery life is 4 yrs, AP 10% and  8%  11. CHF and aortic stenosis: recommend DNRAll labs, medications Tachycardia:resolved, it could be PAF, treated with extra digoxin, will go back to 125mcg daily  12. Health maintenance: exerise and diet  All labs, medications and tests reviewed, continue all other medications of all above medical condition listed as is.     [unfilled]

## 2020-11-12 NOTE — LETTER
Trisha Coe  12/15/1921  [id number]    Have you had any Chest Pain - No  If Yes DO EKG - How does it feel -    How long does the pain last -    How long have you been having the pain -    Did you take a    And did it relieve the pain -     Have you had any Shortness of Breath -   If Yes - When     Have you had any dizziness - No  If Yes DO ORTHOSTATIC BP - when do you feel dizzy    How long does it last     Have you had any palpitations - No  If Yes DO EKG - Do you feel your heart   How long does it last -      Is the patient on any of the following medications -   If Yes DO EKG    Do you have any edema - swelling in     If Yes - CHECK TO SEE IF THE EDEMA IS PITTING  How long have they been having edema -   If Yes - Have they worn compression stockings     Check Venous \"LEG PROBLEM Questionnaire\"    Do you have a surgery or procedure scheduled in the near future -   If Yes- DO EKG  If Yes - Who is the surgery with?    Phone number of physician   Fax number of physician   Type of surgery   BE SURE TO GIVE THIS INFORMATION to [facility]    Ask patient if they want to sign up for MyChart if they are not already signed up    Check to see if we have an E-MAIL on file for the patient    Check medication list thoroughly!!!  BE SURE TO ASK PATIENT IF THEY NEED MEDICATION REFILLS    At check out add to every patient's \"wrap up\" the following dot phrase AFTERHOURSEDUCATION and ensure we explain this to our patients

## 2020-11-12 NOTE — PROGRESS NOTES
Avel Nicholas is a 80 y.o. female evaluated via telephone on 11/12/2020. Consent:  She and/or health care decision maker is aware that that she may receive a bill for this telephone service, depending on her insurance coverage, and has provided verbal consent to proceed: Yes      Documentation:  I communicated with the patient and/or health care decision maker about . Details of this discussion including any medical advice provided:       I affirm this is a Patient Initiated Episode with a Patient who has not had a related appointment within my department in the past 7 days or scheduled within the next 24 hours. Patient identification was verified at the start of the visit: Yes    Total Time: minutes: 5-10 minutes    Note: not billable if this call serves to triage the patient into an appointment for the relevant concern      [address] [place] [address], MD    TELEHEALTH EVALUATION -- Audio/Visual (During RNQRC-25 public health emergency)      Chief complaints: patient is here for management of CHF, PPM,AFIB, DIZZINESS, EPISTAXIS, possible subarachnoid hemorrhage or cortical petechial hemorrhage, recurrent TIA  Subjective: patient feels better, no chest pain, no shortness of breath, no dizziness, no palpitations    HPI Royston Brittle is a 80 y. o.year old who  has a past medical history of Atrial fibrillation and flutter ([address] CHF (congestive heart failure) ([address] Diverticulosis, Enlarged thyroid, GERD (gastroesophageal reflux disease), H/O echocardiogram, H/O subconjunctival hemorrhage, History of CT scan of head, History of echocardiogram, IBS (irritable bowel syndrome), Mitral regurgitation, Osteoarthritis, Osteoporosis, Pacemaker, Palpitations, and Pedal edema.  and presents for management of CHF, PPM,AFIB, DIZZINESS, EPISTAXIS, possible subarachnoid hemorrhage or cortical petechial hemorrhage, recurrent TIA, which are well controlled    She had repeat venous doppler which showed rt common femoral vein DVT  Current Outpatient Medications   Medication Sig Dispense Refill    furosemide (LASIX) 20 MG tablet Take 1 tablet by mouth daily 90 tablet 3    UNABLE TO FIND Cameron lift 1 Units 0    aspirin EC 81 MG EC tablet Take 1 tablet by mouth daily 90 tablet 1    Nutritional Supplements (ENSURE HIGH PROTEIN) LIQD 3 times per day 90 Can 11    digoxin (DIGOX) 125 MCG tablet Take 1 tablet by mouth daily 90 tablet 3    methIMAzole (TAPAZOLE) 5 MG tablet Take 1 tablet by mouth daily 90 tablet 3    Multiple Vitamin (MULTI-VITAMIN DAILY PO) Take 1 tablet by mouth daily       No current facility-administered medications for this visit. Allergies: Dairycare [lactase-lactobacillus]; Lactose; and Milk-related compounds  Past Medical History:   Diagnosis Date    Atrial fibrillation and flutter (HCC)     CHF (congestive heart failure) (HCC)     Diverticulosis     Enlarged thyroid     GERD (gastroesophageal reflux disease)     H/O echocardiogram 05/08/2017    EF 60%     H/O subconjunctival hemorrhage     History of CT scan of head 07/09/2018    No acute intracranial abnormality. Diffuse atrophic changes with findings suggesting chronic microvascular ischemia.     History of echocardiogram 09/26/2016    EF60%-moderate AS, moderate MR, MILD AI, enlarged LA, MODERATE TR    IBS (irritable bowel syndrome)     Mitral regurgitation     moderate severity; Tricuspid Regurgitation: mild severity    Osteoarthritis     primarily affecting the neck, fingers and knees    Osteoporosis     Pacemaker     Palpitations     Pedal edema      Past Surgical History:   Procedure Laterality Date    APPENDECTOMY  18's    with choley    BLADDER SURGERY      per old chart had bladder bx and fulgeration done 7/2007    BREAST BIOPSY  1965    left breast - negative for cancer    CARDIOVASCULAR STRESS TEST  11/2007    treadmill    CATARACT REMOVAL  12/2008    Left    CHOLECYSTECTOMY  1980's    with shelia    nodes  · Allergic/Immunologic: No nasal congestion or hives  All systems negative except as marked. Objective:  LMP  (LMP Unknown)   Wt Readings from Last 3 Encounters:   10/27/20 96 lb (43.5 kg)   10/07/20 96 lb 3.2 oz (43.6 kg)   01/24/20 99 lb 1.6 oz (45 kg)     There is no height or weight on file to calculate BMI. Lab Results   Component Value Date    CKTOTAL 80 03/28/2019    TROPONINI 0.250 04/23/2014     BNP:    Lab Results   Component Value Date    BNP 1,086 04/22/2014     PT/INR:  No results found for: PTINR  No results found for: LABA1C  Lab Results   Component Value Date    CHOL 147 03/29/2019    TRIG 54 03/29/2019    HDL 60 03/29/2019    LDLCALC 114 (H) 05/12/2012    LDLDIRECT 97 03/29/2019     Lab Results   Component Value Date    ALT 8 (L) 10/07/2020    AST 19 10/07/2020     TSH:    Lab Results   Component Value Date    TSH 0.06 11/06/2018       Impression:  Laverne Mccormick is a 80 y. o.year old who  has a past medical history of Atrial fibrillation and flutter ([address] CHF (congestive heart failure) ([facility] [address] Diverticulosis, Enlarged thyroid, GERD (gastroesophageal reflux disease), H/O echocardiogram, H/O subconjunctival hemorrhage, History of CT scan of head, History of echocardiogram, IBS (irritable bowel syndrome), Mitral regurgitation, Osteoarthritis, Osteoporosis, Pacemaker, Palpitations, and Pedal edema. and presents with     Plan:  1. Rt leg DVT: repeat venous doppler of rt leg, will RESTART eliquis 5,H BID she already had subarachnoid hemorrhage in past ( 2 YRS AGO) and his very week and risk of fall is quite great. in past also had subconjunctival hemorrhage, D./C baby aspirn  2. Rt leg sore: arterial doppler reviewed, she had rt ankle bandage  3. Tachycardia:resolved, it could be PAF, treated with extra digoxin, will go back to 125mcg daily  4. Recurrent TIA: pacer is MRI safe, back on baby aspirin,her last cat scan did not show any bleed. 5. Cough and shortness of breath: stable  6.  Exie Been CONTINUE  7. PAF: controlled, she has over active thyroid and just started methimazole,OFF midodrine , stop eliquis because of subconjuctival hemorrhage in past  8. Tachycardia:resolved, off  midodrione  9. Epistaxis:resolved  10. PPM:carelinkl set up, her battery life is 4 yrs, AP 10% and  8%  CHF and aortic stenosis: recommend DNRAll labs, medications Tachycardia:resolved, it could be PAF, treated with extra All labs, medications and tests reviewed, continue all other medications of all above medical condition listed as is. Paula Mcintosh is a 80 y.o. female being evaluated by a Virtual Visit (AUDIO visit) encounter to address concerns as mentioned above. A caregiver was present when appropriate. Due to this being a TeleHealth encounter ([facility]), evaluation of the following organ systems was limited: Vitals/Constitutional/EENT/Resp/CV/GI//MS/Neuro/Skin/Heme-Lymph-Imm. Pursuant to the emergency declaration under the [address] [place] [address] authority and the [employer] and [employer] Act, this Virtual Visit was conducted with patient's (and/or legal guardian's) consent, to reduce the patient's risk of exposure to COVID-19 and provide necessary medical care. The patient (and/or legal guardian) has also been advised to contact this office for worsening conditions or problems, and seek emergency medical treatment and/or call 911 if deemed necessary. Services were provided through a AUDIO synchronous discussion virtually to substitute for in-person clinic visit. Patient and provider were located at their individual homes. 1.   I confirm that this visit was completed in a telehealth setting ,using synchronous audiovisual technology for real time patient interaction . The patient identity with name and date of birth was confirmed .  This evaluation of patient was done by telehealth in the setting of [facility] Public [facility] emergency , which precluded assurance of safe in person visit at the time of service. The patient consented to and accepts potential risks associated with telemedical evaluation and care was taken to assess lan presence of any medical issues that would be more  appropriate for expedited in -person care. Pursuant to the emergency declaration under the [address] [place] [address] [facility] authority and the [employer] and [employer] Act, this Virtual  Visit was conducted, with patient's consent, to reduce the patient's risk of exposure to COVID-19 and provide continuity of care for an established patient. Services were provided through a video synchronous discussion virtually to substitute for in-person clinic visit. 2. I Affirm this is a Patient Initiated Episode with an Established Patient who has not had a related appointment within my department in the past 7 days or scheduled within the next 24 hours. --Maria Luisa Herrera MD on 11/12/2020 at 11:52 AM    An electronic signature was used to authenticate this note.

## 2020-11-12 NOTE — TELEPHONE ENCOUNTER
Dr. Haroon Driscoll would like to do a phone appt with pt, she has a dvt and does not know yet.  I was unable to LM on [facility] phone, and I tried to call her [address] @[phone] BEN

## 2020-11-17 NOTE — TELEPHONE ENCOUNTER
Patient care giver called stating they have received the [facility]) which is fine, but when the care giver has to work it by herself it is hard to do so. Caregiver stated she called to see if they could send out an electric jatinder lift, she was told by Alie Jacobo,  she needed a script in order for them([facility])  to switch it out. Caregiver stated that if she has to pay the differences then she will. She stated she is just worried because when she uses the manual lift by herself her shoulders tend to hurt and she does not want to drop the patient.  Please advise

## 2020-12-03 PROBLEM — I82.409 DVT (DEEP VENOUS THROMBOSIS) (HCC): Status: ACTIVE | Noted: 2020-01-01

## 2020-12-03 NOTE — LETTER
[place]    Dr. Antonia Zapien  12/15/1921  [id number]    Have you had any Chest Pain that is not new? - No    Have you had any Shortness of Breath - Yes  If Yes - When on exertion    Have you had any dizziness - No    Have you had any palpitations that are not new? - No    Do you have any edema - swelling in No        Vein \"LEG PROBLEM Questionnaire\"  1. Do you have prominent leg veins? No   2. Do you have any skin discoloration? No  3. Do you have any healed or active sores? No  4. Do you have swelling of the legs? No  5. Do you have a family history of varicose veins? No  6. Does your profession involve pro-longed        standing or heavy lifting? No  7. Have you been fighting overweight problems? No  8. Do you have restless legs? No  9. Do you have any night time cramps? No  10.  Do you have any of the following in your legs:      Do you have a surgery or procedure scheduled in the near future - No

## 2020-12-03 NOTE — PROGRESS NOTES
James Lewis MD    TELEHEALTH EVALUATION -- Audio/Visual (During RLUCE-56 public health emergency)      Chief complaints: patient is here for management of CHF, PPM,AFIB, DIZZINESS, EPISTAXIS, possible subarachnoid hemorrhage or cortical petechial hemorrhage, recurrent TIA    Subjective: patient feels better, no chest pain, no shortness of breath, no dizziness, no palpitations    HPI Xochitl Cameron is a 80 y. o.year old who  has a past medical history of Atrial fibrillation and flutter ([address] CHF (congestive heart failure) ([facility] [address] Diverticulosis, Enlarged thyroid, GERD (gastroesophageal reflux disease), H/O Doppler venous lower ultrasound, H/O echocardiogram, H/O subconjunctival hemorrhage, History of CT scan of head, History of echocardiogram, IBS (irritable bowel syndrome), Mitral regurgitation, Osteoarthritis, Osteoporosis, Pacemaker, Palpitations, and Pedal edema. and presents for management of CHF, PPM,AFIB, DIZZINESS, EPISTAXIS, possible subarachnoid hemorrhage or cortical petechial hemorrhage, recurrent TIA, which are well controlled    She will 89 yr in 2 weeks, she has been sleeping a lot, she has started eliquis due to her rt leg DVT,  Current Outpatient Medications   Medication Sig Dispense Refill    apixaban (ELIQUIS) 5 MG TABS tablet Take 5 mg by mouth 2 times daily      UNABLE TO FIND Electric jatinder lift 1 Units 0    furosemide (LASIX) 20 MG tablet Take 1 tablet by mouth daily 90 tablet 3    Nutritional Supplements (ENSURE HIGH PROTEIN) LIQD 3 times per day 90 Can 11    digoxin (DIGOX) 125 MCG tablet Take 1 tablet by mouth daily 90 tablet 3    methIMAzole (TAPAZOLE) 5 MG tablet Take 1 tablet by mouth daily 90 tablet 3    Multiple Vitamin (MULTI-VITAMIN DAILY PO) Take 1 tablet by mouth daily      aspirin EC 81 MG EC tablet Take 1 tablet by mouth daily (Patient not taking: Reported on 12/3/2020) 90 tablet 1     No current facility-administered medications for this visit.       Allergies: Dairycare [lactase-lactobacillus]; Lactose; and Milk-related compounds  Past Medical History:   Diagnosis Date    Atrial fibrillation and flutter (HCC)     CHF (congestive heart failure) (HCC)     Diverticulosis     Enlarged thyroid     GERD (gastroesophageal reflux disease)     H/O Doppler venous lower ultrasound 11/10/2020    Acute partially occlusive DVT of the Right CFV and proximal FV.  Chronic SVT of the prox, mid and distal SSV. Pt has OV today with Dr. Lynn Rodriguez.    H/O echocardiogram 05/08/2017    EF 60%     H/O subconjunctival hemorrhage     History of CT scan of head 07/09/2018    No acute intracranial abnormality. Diffuse atrophic changes with findings suggesting chronic microvascular ischemia.     History of echocardiogram 09/26/2016    EF60%-moderate AS, moderate MR, MILD AI, enlarged LA, MODERATE TR    IBS (irritable bowel syndrome)     Mitral regurgitation     moderate severity; Tricuspid Regurgitation: mild severity    Osteoarthritis     primarily affecting the neck, fingers and knees    Osteoporosis     Pacemaker     Palpitations     Pedal edema      Past Surgical History:   Procedure Laterality Date    APPENDECTOMY  18's    with choley    BLADDER SURGERY      per old chart had bladder bx and fulgeration done 7/2007    BREAST BIOPSY  1965    left breast - negative for cancer    CARDIOVASCULAR STRESS TEST  11/2007    treadmill    CATARACT REMOVAL  12/2008    Left    CHOLECYSTECTOMY  1980's    with appy    COLONOSCOPY  2010    HERNIA REPAIR  1986    left inguinal    INGUINAL HERNIA REPAIR  08-20-12    Right Side     PACEMAKER INSERTION  03/14/2014    L upper chest// per old chart had 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    AZ SONO GUIDE NEEDLE BIOPSY  12/2018    TONSILLECTOMY AND ADENOIDECTOMY  1941    VARICOSE VEIN SURGERY  1960's per old chart    stripping bilat legs     Family History   Problem Relation Age of Onset    High Blood Pressure Sister     Other Sister         sinus problems, vericose veins    Other Father         GI probloems - hx obstruction    Heart Disease Sister         CHF    Diabetes Sister         s/p partial pancreas removal    Early Death Daughter 40        unknown cause    Asthma Son     Cancer Brother         leukemia    Heart Disease Brother         CABG    Early Death Brother         Murdered    Other Brother         dies at 80. unknown cause.  Cancer Brother         throat    Early Death Brother 5        pneumonia     Social History     Tobacco Use    Smoking status: Never Smoker    Smokeless tobacco: Never Used   Substance Use Topics    Alcohol use: No      [unfilled]  Review of Systems:   · Constitutional: No Fever or Weight Loss   · Eyes: No Decreased Vision  · ENT: No Headaches, Hearing Loss or Vertigo  · Cardiovascular: No chest pain, dyspnea on exertion, palpitations or loss of consciousness  · Respiratory: No cough or wheezing    · Gastrointestinal: No abdominal pain, appetite loss, blood in stools, constipation, diarrhea or heartburn  · Genitourinary: No dysuria, trouble voiding, or hematuria  · Musculoskeletal:  No gait disturbance, weakness or joint complaints  · Integumentary: No rash or pruritis  · Neurological: No TIA or stroke symptoms  · Psychiatric: No anxiety or depression  · Endocrine: No malaise, fatigue or temperature intolerance  · Hematologic/Lymphatic: No bleeding problems, blood clots or swollen lymph nodes  · Allergic/Immunologic: No nasal congestion or hives  All systems negative except as marked. Objective:  LMP  (LMP Unknown)   Wt Readings from Last 3 Encounters:   10/27/20 96 lb (43.5 kg)   10/07/20 96 lb 3.2 oz (43.6 kg)   01/24/20 99 lb 1.6 oz (45 kg)     There is no height or weight on file to calculate BMI.     PHYSICAL EXAMINATION:  [ INSTRUCTIONS:  \"[x]\" Indicates a positive item  \"[]\" Indicates a negative item  -- DELETE ALL ITEMS NOT EXAMINED]  Vital Signs: (As obtained by patient/caregiver or practitioner observation)    Blood pressure-  Heart rate-    Respiratory rate-    Temperature-  Pulse oximetry-     Constitutional: [x] Appears well-developed and well-nourished [x] No apparent distress      [] Abnormal-   Mental status  [x] Alert and awake  [x] Oriented to person/place/time [x]Able to follow commands      Eyes:  EOM    [x]  Normal  [] Abnormal-  Sclera  [x]  Normal  [] Abnormal -         Discharge []  None visible  [] Abnormal -    HENT:   [x] Normocephalic, atraumatic.   [] Abnormal   [] Mouth/Throat: Mucous membranes are moist.     External Ears [x] Normal  [] Abnormal-     Neck: [x] No visualized mass     Pulmonary/Chest: [x] Respiratory effort normal.  [x] No visualized signs of difficulty breathing or respiratory distress        [] Abnormal-      Musculoskeletal:   [x] Normal gait with no signs of ataxia         [x] Normal range of motion of neck        [] Abnormal-       Neurological:        [x] No Facial Asymmetry (Cranial nerve 7 motor function) (limited exam to video visit)          [] No gaze palsy        [] Abnormal-         Skin:        [x] No significant exanthematous lesions or discoloration noted on facial skin         [] Abnormal-            Psychiatric:       [x] Normal Affect [x] No Hallucinations        [] Abnormal-     Other pertinent observable physical exam findings-     Lab Results   Component Value Date    CKTOTAL 80 03/28/2019    TROPONINI 0.250 04/23/2014     BNP:    Lab Results   Component Value Date    BNP 1,086 04/22/2014     PT/INR:  No results found for: PTINR  No results found for: LABA1C  Lab Results   Component Value Date    CHOL 147 03/29/2019    TRIG 54 03/29/2019    HDL 60 03/29/2019    LDLCALC 114 (H) 05/12/2012    LDLDIRECT 97 03/29/2019     Lab Results   Component Value Date    ALT 8 (L) 10/07/2020    AST 19 10/07/2020     TSH:    Lab Results   Component Value Date    TSH 0.06 11/06/2018       Impression:  Ivette Sacks is a 80 y.o.year old who  has a past medical history of Atrial fibrillation and flutter ([facility] [address] CHF (congestive heart failure) ([facility] [address] Diverticulosis, Enlarged thyroid, GERD (gastroesophageal reflux disease), H/O Doppler venous lower ultrasound, H/O echocardiogram, H/O subconjunctival hemorrhage, History of CT scan of head, History of echocardiogram, IBS (irritable bowel syndrome), Mitral regurgitation, Osteoarthritis, Osteoporosis, Pacemaker, Palpitations, and Pedal edema. and presents with     Plan:  1. Rt leg DVT: CONTINUE eliquis 5,H BID she already had subarachnoid hemorrhage in past ( 2 YRS AGO) and his very week and risk of fall is quite great. in past also had subconjunctival hemorrhage, D./C baby aspirn  2. Rt leg sore: arterial doppler reviewed, she had rt ankle bandage  3. Tachycardia:resolved, it could be PAF, treated with extra digoxin, will go back to 125mcg daily  4. Recurrent TIA: pacer is MRI safe, back on baby aspirin,her last cat scan did not show any bleed. 5. Cough and shortness of breath: stable  6. LASIX TO CONTINUE  7. PAF: controlled, she has over active thyroid and just started methimazole,OFF midodrine ,Tachycardia:resolved, off  midodrione  8. Epistaxis:resolved  9. PPM:carelinkl set up, her battery life is 4 yrs, AP 10% and  8%  10. CHF and aortic stenosis: recommend DNRAAll labs, medications and tests reviewed, continue all other medications of all above medical condition listed as is. Leeanna Holguin is a 80 y.o. female being evaluated by a Virtual Visit (video visit) encounter to address concerns as mentioned above. A caregiver was present when appropriate. Due to this being a TeleHealth encounter (During [facility] public health emergency), evaluation of the following organ systems was limited: Vitals/Constitutional/EENT/Resp/CV/GI//MS/Neuro/Skin/Heme-Lymph-Imm.   Pursuant to the emergency declaration under the [address], [address] waiver authority and the [employer] and [employer] Act, this Virtual Visit was conducted with patient's (and/or legal guardian's) consent, to reduce the patient's risk of exposure to COVID-19 and provide necessary medical care. The patient (and/or legal guardian) has also been advised to contact this office for worsening conditions or problems, and seek emergency medical treatment and/or call 911 if deemed necessary. Services were provided through a video synchronous discussion virtually to substitute for in-person clinic visit. Patient and provider were located at their individual homes. 1.   I confirm that this visit was completed in a telehealth setting ,using synchronous audiovisual technology for real time patient interaction . The patient identity with name and date of birth was confirmed . This evaluation of patient was done by telehealth in the setting of CLAIA-73 Public health emergency , which precluded assurance of safe in person visit at the time of service. The patient consented to and accepts potential risks associated with telemedical evaluation and care was taken to assess lan presence of any medical issues that would be more  appropriate for expedited in -person care. Pursuant to the emergency declaration under the [facility], [address] waiver authority and the [employer] and [employer] Act, this Virtual  Visit was conducted, with patient's consent, to reduce the patient's risk of exposure to COVID-19 and provide continuity of care for an established patient. Services were provided through a video synchronous discussion virtually to substitute for in-person clinic visit. 2. I Affirm this is a Patient Initiated Episode with an Established Patient who has not had a related appointment within my department in the past 7 days or scheduled within the next 24 hours.       --Hilda Lewis MD on 12/3/2020 at 11:32 AM    An electronic signature was used to authenticate this note.

## 2020-12-16 NOTE — TELEPHONE ENCOUNTER
Caregiver stated she already has the disability placard that hangs on the mirror, she needs the actually licence plate that goes onto the car.  Please advise

## 2020-12-16 NOTE — TELEPHONE ENCOUNTER
Patient caregiver called stating that she is getting ready to get a mobile van for the patient and caregiver does not know if she was to talk to you about getting paper work for  the handicap licence plate that she is needing to get for the mobile van. Please advise.

## 2021-01-01 ENCOUNTER — TELEPHONE (OUTPATIENT)
Dept: CARDIOLOGY CLINIC | Age: 86
End: 2021-01-01

## 2021-01-01 ENCOUNTER — VIRTUAL VISIT (OUTPATIENT)
Dept: FAMILY MEDICINE CLINIC | Age: 86
End: 2021-01-01
Payer: COMMERCIAL

## 2021-01-01 ENCOUNTER — TELEPHONE (OUTPATIENT)
Dept: FAMILY MEDICINE CLINIC | Age: 86
End: 2021-01-01

## 2021-01-01 DIAGNOSIS — L89.159 PRESSURE INJURY OF SKIN OF SACRAL REGION, UNSPECIFIED INJURY STAGE: ICD-10-CM

## 2021-01-01 DIAGNOSIS — I73.9 PAD (PERIPHERAL ARTERY DISEASE) (HCC): ICD-10-CM

## 2021-01-01 DIAGNOSIS — I50.30 DIASTOLIC CONGESTIVE HEART FAILURE, UNSPECIFIED HF CHRONICITY (HCC): ICD-10-CM

## 2021-01-01 DIAGNOSIS — Z86.718 HISTORY OF DVT IN ADULTHOOD: ICD-10-CM

## 2021-01-01 DIAGNOSIS — S09.91XA INJURY OF EAR, INITIAL ENCOUNTER: ICD-10-CM

## 2021-01-01 DIAGNOSIS — R54 SENILE DEBILITY: Primary | ICD-10-CM

## 2021-01-01 PROCEDURE — 99442 PR PHYS/QHP TELEPHONE EVALUATION 11-20 MIN: CPT | Performed by: FAMILY MEDICINE

## 2021-01-07 NOTE — PROGRESS NOTES
Madeline Perez is a 80 y.o. female evaluated via telephone on 1/7/2021. Consent:  She and/or health care decision maker is aware that that she may receive a bill for this telephone service, depending on her insurance coverage, and has provided verbal consent to proceed: Yes      Documentation:  I communicated with the patient and/or health care decision maker about       Left ear: scratching at it. Woke up with blood all over her her ear. She sleeps on her ear. Didn't bleed further after cleaning. Cannot see the source of the bleeding and thinks the bleeding was inside the ear. Hyperthyroid: is taking her tapazole regularly    Ulcer on right ankle has totally healed    Ulcer: Caregiver concerned about a dime sized ulcer on her buttocks at the crease. Caregiver has been applying Vaseline along with bandage. She suspects the patient is pulling off the bandage and scratching at the ulcer which causes it to bleed. Caregiver is trying to keep her nails trimmed and is trying to keep gloves on her hands. Dementia: Caregiver thinks she has sundowners. The dementia gets worse in the evenings. Details of this discussion including any medical advice provided:     Diagnosis Orders   1. Senile debility  [facility]   2. PAD (peripheral artery disease) [facility])  [facility]   3. Diastolic congestive heart failure, unspecified HF chronicity [facility])  [facility]   4. Pressure injury of skin of sacral region, unspecified injury stage     5. History of DVT in adulthood     6. Injury of ear, initial encounter     once the Eliquis runs out, no refill needed. Unable to evaluate the bloody ear. However to reassuring that caregiver has cleaned up the ear and sees no lesions. I have advised her not to clean the ear with Q-tips. She may irrigate the ear with saline in about 5 days.   Allowing what ever cut was there to heal I affirm this is a Patient Initiated Episode with a Patient who has not had a related appointment within my department in the past 7 days or scheduled within the next 24 hours.     Patient identification was verified at the start of the visit: Yes    Total Time: minutes: 11-20 minutes    Note: not billable if this call serves to triage the patient into an appointment for the relevant concern      Nova Thompson

## 2021-01-07 NOTE — PATIENT INSTRUCTIONS
PLEASE BRING YOUR MEDICATIONS TO ALL APPOINTMENTS    The diagnoses and medications listed in this after visit summary may not be accurate at the time of check out. Please check MY CHART in 28-48 hours for possible corrections. Late cancellation policy: So that we can better accommodate people who are sick, please give our office 24 hour notice for an appointment cancellation. Thank you. Missed appointments: Your care is very important to us. It is important that you keep your scheduled appointments. Multiple missed appointments will lead to a dismissal from the office. Later arrival policy: If you are more than 10 minutes late for your appointment, you will be asked to reschedule. Please allow 5-7 business days for paperwork to be processed. It is important that you check your MY Chart messages, as they include appointment reminders, test results, and other important information. If you have forgotten your password, please call [phone].          1. Take your blood pressure medications at night. This reduces your chance of cardiovascular event by half  2. Fever in kids: It's best to give both Tylenol and Ibuprofen at the same time rather than staggering them which is confusing  3. Pediatric obesity is decreased by less exposure to antibiotics, consuming whole milk instead of skim milk, watching public TV instead of regular TV, and experiencing fewer adverse childhood events  4. 1 egg per day is good for your heart  5. Alternate day fasting does promote weight loss. 6. Skipping breakfast increases your risk of obesity  7. Artificially sweetened drinks increase all cause mortality (strokes, BMI, cardiovascular)  8. Kale consumption can reduce onset of dementia  9. Walking at least 8000 steps per day and resistance exercise 2-3 x per week are good for your heart  10.  Covid 19:  Wearing gloves is not that helpful Having low vitamin D levels increases risk of infection (take 2-4000 units of D3 per day until our covid crisis is resolved)  11.  Brushing teeth 3 times per day can decrease chance of getting diabetes

## 2021-01-13 NOTE — TELEPHONE ENCOUNTER
Rigoberto Burrows from [facility] [place] [facility] called stating that the patient caregiver declined NT and PT to come into her home. Rigoberto Burrows stated that the caregiver told him all she wants is for some to come in and sit with the patient and to help with personal needs. Rigoberto Burrows stated he give the caregiver some names and numbers to other agencies that will come in and sit with the patient. Rigoberto Burrows stated the caregiver did say if she needed help from [facility] she will reach out to the office to get help. Caregiver stated she is doing fine with the way she is doing things now.

## 2021-01-13 NOTE — TELEPHONE ENCOUNTER
Check with caregiver. I think she wanted me to write for special equipment for the jatinder lift. I may have forgotten to do so. Please clarify what she wants and i'll take care of it right away.

## 2021-01-14 NOTE — TELEPHONE ENCOUNTER
They will either need to do a virtual appointment so that I can see the bedsore on camera or bring her in for an appointment. If it is not infected, I recommend applying Vaseline and placing a gauze pad over that.

## 2021-01-14 NOTE — TELEPHONE ENCOUNTER
Caregiver dropped off information regarding the equipment needed for the jatinder lift. Patient has a bed sore on her bottom, that patient is digging at and it is bleeding. Patients caregiver is cleaning with saline and put Vaseline on it and using a gauze bandage with water proof tape. Patients caregiver stated she has gentamicin 0.1% and bactriban 2% ointment from the wound clinic left over from sore on patients ankle. Can giver put this on the wound? Or anything else to use.  Please advise

## 2021-01-29 NOTE — TELEPHONE ENCOUNTER
Patient has medication questions; patient believed that she was taken off of aspirin when she was put on Eliquis. Please call her back at ph# [phone].

## 2021-02-09 NOTE — TELEPHONE ENCOUNTER
Patient caregiver called stating that the patient is declining she is not eating or drinking anymore. Caregiver is asking where do we go from here. Patient has no WILL power to do anything. Caregiver was told to keep the patient comfortable but want else can we do.  Please advise     Thank you

## 2021-02-09 NOTE — TELEPHONE ENCOUNTER
Patient caregiver stated that is not what the patient wants, patient shows no pain at all. Patient caregiver stated that she does not want to go against her will. Caregiver is just torn and does not know what to do. If she gets to the point where she can not handle it, she will than have hospice step in. Her question is, if patient decides to go in her sleep what is her next steps what is she suppose to do?

## 2021-02-09 NOTE — TELEPHONE ENCOUNTER
I am not sure what is meant by if patient decides to go in her sleep. She may take patient to the emergency room if she wants her to be evaluated.

## 2021-11-30 NOTE — TELEPHONE ENCOUNTER
Liz Fabian called stating patient is fatigue, diarrhea and confused. I spoke to Dr. Sanna Cooper and he wants to see the patient. Called and spoke to Liz Fabian to schedule appt.
NEGATIVE

## 2022-04-06 NOTE — TELEPHONE ENCOUNTER
Caregiver stated that she will hospice come in, please advise Mercedes Flap Text: The defect edges were debeveled with a #15 scalpel blade.  Given the location of the defect, shape of the defect and the proximity to free margins a Mercedes flap was deemed most appropriate.  Using a sterile surgical marker, an appropriate advancement flap was drawn incorporating the defect and placing the expected incisions within the relaxed skin tension lines where possible. The area thus outlined was incised deep to adipose tissue with a #15 scalpel blade.  The skin margins were undermined to an appropriate distance in all directions utilizing iris scissors.
